# Patient Record
Sex: FEMALE | Race: BLACK OR AFRICAN AMERICAN | NOT HISPANIC OR LATINO | URBAN - METROPOLITAN AREA
[De-identification: names, ages, dates, MRNs, and addresses within clinical notes are randomized per-mention and may not be internally consistent; named-entity substitution may affect disease eponyms.]

---

## 2019-07-16 ENCOUNTER — OFFICE VISIT (OUTPATIENT)
Dept: BARIATRICS | Facility: CLINIC | Age: 54
End: 2019-07-16
Payer: OTHER GOVERNMENT

## 2019-07-16 VITALS
DIASTOLIC BLOOD PRESSURE: 80 MMHG | HEART RATE: 65 BPM | SYSTOLIC BLOOD PRESSURE: 100 MMHG | WEIGHT: 232.81 LBS | HEIGHT: 69 IN | BODY MASS INDEX: 34.48 KG/M2

## 2019-07-16 DIAGNOSIS — I10 ESSENTIAL HYPERTENSION: ICD-10-CM

## 2019-07-16 DIAGNOSIS — E66.9 OBESITY, CLASS I, BMI 30.0-34.9 (SEE ACTUAL BMI): Primary | ICD-10-CM

## 2019-07-16 DIAGNOSIS — E78.5 HYPERLIPIDEMIA, UNSPECIFIED HYPERLIPIDEMIA TYPE: ICD-10-CM

## 2019-07-16 DIAGNOSIS — H40.9 GLAUCOMA, UNSPECIFIED GLAUCOMA TYPE, UNSPECIFIED LATERALITY: ICD-10-CM

## 2019-07-16 DIAGNOSIS — R73.03 PREDIABETES: ICD-10-CM

## 2019-07-16 PROBLEM — E66.811 OBESITY, CLASS I, BMI 30.0-34.9 (SEE ACTUAL BMI): Status: ACTIVE | Noted: 2019-07-16

## 2019-07-16 PROCEDURE — 99999 PR PBB SHADOW E&M-NEW PATIENT-LVL III: CPT | Mod: PBBFAC,,, | Performed by: INTERNAL MEDICINE

## 2019-07-16 PROCEDURE — 99205 OFFICE O/P NEW HI 60 MIN: CPT | Mod: S$GLB,,, | Performed by: INTERNAL MEDICINE

## 2019-07-16 PROCEDURE — 99999 PR PBB SHADOW E&M-NEW PATIENT-LVL III: ICD-10-PCS | Mod: PBBFAC,,, | Performed by: INTERNAL MEDICINE

## 2019-07-16 PROCEDURE — 99205 PR OFFICE/OUTPT VISIT, NEW, LEVL V, 60-74 MIN: ICD-10-PCS | Mod: S$GLB,,, | Performed by: INTERNAL MEDICINE

## 2019-07-16 RX ORDER — ATORVASTATIN CALCIUM 20 MG/1
20 TABLET, FILM COATED ORAL NIGHTLY
Refills: 5 | COMMUNITY
Start: 2019-05-03 | End: 2023-05-29 | Stop reason: DRUGHIGH

## 2019-07-16 RX ORDER — AMLODIPINE BESYLATE 5 MG/1
5 TABLET ORAL DAILY
Refills: 3 | COMMUNITY
Start: 2019-04-15 | End: 2024-01-09 | Stop reason: SDUPTHER

## 2019-07-16 RX ORDER — HYDROCHLOROTHIAZIDE 12.5 MG/1
12.5 TABLET ORAL DAILY
Refills: 4 | COMMUNITY
Start: 2019-06-20 | End: 2024-01-09 | Stop reason: SDUPTHER

## 2019-07-16 RX ORDER — IRBESARTAN 150 MG/1
150 TABLET ORAL DAILY
Refills: 3 | COMMUNITY
Start: 2019-04-15 | End: 2023-02-16

## 2019-07-16 NOTE — PATIENT INSTRUCTIONS
Start Ozempic once a week. Start with 0.25mg once a week x 4 weeks, then 0.5 mg weekly.     Decrease portions as soon as you start Ozempic. Some nausea in the first 2 weeks is not unusual.     If you get pain across the upper abdomen and around to your back, please call the office.       Protein and fiber in every meal.     3 meals a day made up of the following:  Unlimited green vegetables, tomatoes, mushrooms, spaghetti squash, cauliflower, meat, poultry, seafood, eggs and hard cheeses.   Milk and plain yogurt  Dressings, seasonings, condiments, etc should have less than 2 g sugars.   Beans (1-1.5 cups) or nuts (1/4 cup) can have 1 x a day.   1-2 servings of citrus fruits, berries, pineapple or melon a day (1/2 cup)    Avoid fried foods    Avoid/limit sweets, grains, rice, pasta, potatoes, bread, corn, peas, oatmeal, grits, tortillas, crackers, chips    No soda, sweet tea, juices or lemonade.     Www.dietdoctor.Rule. for recipes. Moderate carb intake      *You can substitute regular dairy and/or dressings, and whole eggs for egg whites in the ideas below.       Meal Ideas for Regular Bariatric Diet  *Recipes and products available at www.bariatriceating.com      Breakfast: (15-20g protein)    - Egg white omelet: 2 egg whites or ½ cup Egg Beaters. (Optional proteins: cheese, shrimp, black beans, chicken, sliced turkey) (Optional veggies: tomatoes, salsa, spinach, mushrooms, onions, green peppers, or small slice avocado)     - Egg and sausage: 1 egg or ¼ cup Egg Beaters (any variety), with 1 gabby or 2 links of Turkey sausage or Veggie breakfast sausage (inContact or Sensulin)    - Crust-less breakfast quiche: To make a glass pie dish, mix 4oz part skim Ricotta, 1 cup skim milk, and 2 eggs as your base. Add protein: shredded cheese, sliced lean ham or turkey, turkey yoder/sausage. Add veggies: tomato, onion, green onion, mushroom, green pepper, spinach, etc.    - Yogurt parfait: Mix 1 - 6oz container Filemon Ferraro  N Fit vanilla yogurt, with ¼ cup Kashi Go Lean cereal    - Cottage cheese and fruit: ½ cup part-skim cottage cheese or ricotta cheese topped with fresh fruit or sugar free preserves     - Hilary Ann's Vanilla Egg custard* (add 2 Tbsp instant coffee granules to make Cappuccino Custard*)    - Hi-Protein café latte (skim milk, decaf coffee, 1 scoop protein powder). Optional to add Sugar free syrup or extract flavoring.    Lunch: (20-30g protein)    - ½ cup Black bean soup (Homemade or Progresso), with ¼ cup shredded low-fat cheese. Top with chopped tomato or fresh salsa.     - Lean deli turkey breast and low-fat sliced cheese, mustard or light weir to moisten, rolled up together, or wrapped in a Familia lettuce leaf    - Chicken salad made from dinner leftovers, moisten with low-fat salad dressing or light weir. Also try leftover salmon, shrimp, tuna or boiled eggs. Serve ½ cup over dark green salad    - Fat-free canned refried beans, topped with ¼ cup shredded low-fat cheese. Top with chopped tomato or fresh salsa.     - Greek salad: Top mixed greens with 1-2oz grilled chicken, tomatoes, red onions, 2-3 kalamata olives, and sprinkle lightly with feta cheese. Spritz with Balsamic vinegar to taste.     - Crust-less lunch quiche: To make a glass pie dish, mix 4oz part skim Ricotta, 1 cup skim milk, and 2 eggs as your base. Add protein: shredded cheese, sliced lean ham or turkey, shrimp, chicken. Add veggies: tomato, onion, green onion, mushroom, green pepper, spinach, artichoke, broccoli, etc.    - Pizza bake: tomato sauce, low-fat shredded mozzarella and turkey pepperoni or Gabonese yoder. Add any veggies.    - Cucumber crab bites: Spread ¼ cup crab dip (lump crabmeat + light cream cheese and green onions) over sliced cucumber.     - Chicken with light spinach and artichoke dip*: Puree in : 6oz cooked and drained spinach, 2 cloves garlic, 1 can cannelloni beans, ½ cup chopped green onions, 1 can drained  artichoke hearts (not marinated in oil), lemon juice and basil. Mix in 2oz chopped up chicken.    Supper: (20-30g protein)    - Serve grilled fish over dark green salad tossed with low-fat dressing, served with grilled asparagus gunter     - Rotisserie chicken salad: served with sliced strawberries, walnuts, fat-free feta cheese crumbles and 1 tbsp Cabas Own Light Raspberry Dwight Vinaigrette    - Shrimp cocktail: Dip cold boiled shrimp in homemade low-sugar cocktail sauce (1/2 cup Hanny One Carb ketchup, 2 tbsp horseradish, 1/4 tsp hot sauce, 1 tsp Worcestershire sauce, 1 tbsp freshly-squeezed lemon juice). Serve with dark green salad, walnuts, and crumbled blue cheese drizzled with olive oil and Balsamic vinegar    - Tuna Melt: Spread tuna salad onto 2 thick slices of tomato. Top with low-fat cheese and broil until cheese is melted. May also be made with chicken salad of shrimp salad. Steuben with different types of cheeses.    - Homemade low-fat Chili using extra lean ground beef or ground turkey. Top with shredded cheese and salsa as desired. May add dollop fat-free sour cream if desired    - Dinner Omelet with shrimp or chicken and onion, green peppers and chives.    - No noodle lasagna: Use sliced zucchini or eggplant in place of noodles.  Layer with part skim ricotta cheese and low sugar meat sauce (use very lean ground beef or ground turkey).    - Mexican chicken bake: Bake chunks of chicken breast or thigh with taco seasoning, Pace brand enchilada sauce, green onions and low-fat cheese. Serve with ¼ cup black beans or fat free refried beans topped with chopped tomatoes or salsa.    - Modesto frozen meatballs, simmered in Classico Marinara sauce. Different flavors of salsa or spaghetti sauce create different dishes! Sprinkle with parmesan cheese. Serve with grilled or steamed veggies, or a dark green salad.    - Simmer boneless skinless chicken thigh chunks in Classico Marielina sauce or roasted  salsa until tender with chopped onion, bell pepper, garlic, mushrooms, spinach, etc.     - Hamburger, without the bun, dressed the way you like. Served with grilled or steamed veggies.    - Eggplant parmesan: Bake slices of eggplant at 350 degrees for 15 minutes. Layer tomato sauce, sliced eggplant and low-fat mozzarella cheese in a baking dish and cover with foil. Bake 30-40 more minutes or until bubbly. Uncover and bake at 400 degrees for about 15 more minutes, or until top is slightly crisp.    - Fish tacos: grilled/baked white fish, wrapped in Familia lettuce leaf, topped with salsa, shredded low-fat cheese, and light coleslaw.    Snacks: (100-200 calories; >5g protein)    - 1 low-fat cheese stick with 8 cherry tomatoes or 1 serving fresh fruit  - 4 thin slices fat-free turkey breast and 1 slice low-fat cheese  - 4 thin slices fat-free honey ham with wedge of melon  - 1/4 cup unsalted nuts with ½ cup fruit  - 6-oz container Dannon Light n Fit vanilla yogurt, topped with 1oz unsalted nuts         - apple, celery or baby carrots spread with 2 Tbsp natural peanut butter or almond butter   - apple slices with 1 oz slice low-fat cheese  - celery, cucumber, bell pepper or baby carrots dipped in ¼ cup hummus bean spread or light spinach and artichoke dip (*recipe in lunch section)  - 100 calorie bag microwave light popcorn with 3 tbsp grated parmesan cheese  - Augusto Links Beef Steak - 14g protein! (similar to beef jerky)  - 2 wedges Laughing Cow - Light Herb & Garlic Cheese with sliced cucumber or green bell pepper  - 1/2 cup low-fat cottage cheese with ¼ cup fruit or ¼ cup salsa  - RTD Protein drinks: Atkins, Low Carb Slim Fast, EAS light, Muscle Milk Light, etc.  - Homemade Protein drinks: GNC Soy95, Isopure, Nectar, UNJURY, Whey Gourmet, etc. Mix 1 scoop powder with 8oz skim/1% milk or light soymilk.  - Protein bars: Atkins, EAS, Pure Protein, Think Thin, Detour, etc. Must have 0-4 grams sugar - Read the  label.    Takeout Options: No more than twice/week  Deli - Salads (no pasta or rice), meats, cheeses. Roasted chicken. Lox (salmon)    Mexican - Platters which don't include tortillas, chips, or rice. Go easy on the beans. Example: Fajitas without the tortillas. Ask the  not to bring chips to the table if they are too tempting.    Greek - Meat or fish and vegetable, but no bread or rice. Including hummus, baba ganoush, etc, is OK. Most sit-down Greek restaurants can provide you with cucumber slices for dipping instead of shaina bread.    Fast Food (Avoid as much as possible) - Salads (no croutons and limit salad dressing to 2 tbsp), grilled chicken sandwich without the bun and ask for no weir. Yelitzas low fat chili or Taco Bell pintos and cheese.    BBQ - The meats are fine if you ask for sauces on the side, but most of the traditional side dishes are loaded with carbs. Sha slaw, baked beans and BBQ sauce are typically made with sugar.    Chinese - Nothing deep-fried, no rice or noodles. Many Chinese sauces have starch and sugar in them, so you'll have to use your judgement. If you find that these sauces trigger cravings, or cause Dumping, you can ask for the sauce to be made without sugar or just use soy sauce.

## 2019-07-16 NOTE — LETTER
[facility]  [address]  [facility] [address]  Phone: [phone]  Fax: [phone] July 16, 2019      Lamont Patel MD  [address]  [address]  [place] [address]    Patient: Shiloh Thayer   MR Number: [id number]   YOB: 1965   Date of Visit: 7/16/2019     Dear Dr. Patel:    Thank you for referring Shiloh Thayer to me for evaluation. Below are the relevant portions of my assessment and plan of care.    Ms. Thayer's assessment is as follows:    1. Obesity, Class I, BMI 30.0-34.9 (see actual BMI)    2. Essential hypertension    3. Hyperlipidemia, unspecified hyperlipidemia type    4. Prediabetes    5. Glaucoma, unspecified glaucoma type, unspecified laterality        PLAN:  1. Obesity, Class I, BMI 30.0-34.9 (see actual BMI)  Start Ozempic once a week. Start with 0.25mg once a week x 4 weeks, then 0.5 mg weekly.      Decrease portions as soon as you start Ozempic. Some nausea in the first two weeks is not unusual.      If you get pain across the upper abdomen and around to your back, please call the office.       Protein and fiber in every meal.      3 meals a day made up of the following:  · Unlimited green vegetables, tomatoes, mushrooms, spaghetti squash, cauliflower, meat, poultry, seafood, eggs and hard cheeses.   · Milk and plain yogurt  · Dressings, seasonings, condiments, etc should have less than 2 g sugars.   · Beans (1-1.5 cups) or nuts (1/4 cup) can have 1 x a day.   · 1-2 servings of citrus fruits, berries, pineapple or melon a day (1/2 cup)  · Avoid fried foods     Avoid/limit sweets, grains, rice, pasta, potatoes, bread, corn, peas, oatmeal, grits, tortillas, crackers, chips     No soda, sweet tea, juices or lemonade.      [email or web address].dietdoctor.com for recipes. Moderate carb intake     2. Essential hypertension  The current medical regimen is effective; continue present plan and medications. Expect improvement with weight loss.      3. Hyperlipidemia,  unspecified hyperlipidemia type  Expect improvement with weight loss. Recheck after 10% TBW lost.       4. Prediabetes  Discussed decreasing the risks of diabetes with changes in diet, routine exercise and losing weight. Ozempic will help.  She will bring in updated labs next OV.      5. Glaucoma  Avoid stimulant anorectics, topiramate and contrave. The patient will update medlist with drops via [facility].       If you have questions, please do not hesitate to call me. I look forward to following Shiloh along with you.    Sincerely,      Norma Lord MD   Section of Bariatric Surgery  Department of Surgery  [facility]    AGF/afw

## 2019-07-16 NOTE — PROGRESS NOTES
"Subjective:       Patient ID: Shiloh Thayer is a 53 y.o. female.    Chief Complaint: Consult    CC: Weight    Current attempts at weight loss: New pt to me, referred by No referring provider defined for this encounter. , with Patient Active Problem List:     Hypertension     Hyperlipidemia     Obesity, Class I, BMI 30.0-34.9 (see actual BMI)       "monitors" her carbs. Walking or biking 2-3 times a week for 30 min. States told blood sugar was borderline.     Previous diet attempts: Denies.     History of medication for loss: Denies.   checked today     Heaviest weight: 245#    Lightest weight: 170#    Goal weight: 185#      Last eye exam:   + glaucoma. On drops.      Provider: Dr. Walsh.     Typical eating patterns: Works at [employer]. Lives with . Pt does cooking.   Breakfast: May skip. Protein shake. Weekends: eggs, sausage, toast.     Lunch: salad with grilled chicken. Turkey sandwich. Subway- cold cut trio 6 inch. Weekends: burgers, fried seafood.     Dinner: beans and rice, steak and potatoes, pasta. Weekends: fried seafood. ROast and pot and veg.     Snacks: chips, cookies.     Beverages: water, wine- 2 times a week. Coffee- cream and sugar.     Willingness to change: 10/10    EKG:    BMR: 1709    Review of Systems   Constitutional: Negative for chills and fever.   Respiratory: Negative for shortness of breath.         + snores   Cardiovascular: Negative for chest pain and leg swelling.   Gastrointestinal: Negative for constipation and diarrhea.        Denies GERD   Genitourinary: Negative for difficulty urinating and dysuria.        S/p hyst   Musculoskeletal: Positive for arthralgias and joint swelling.   Neurological: Negative for dizziness and light-headedness.   Psychiatric/Behavioral: Negative for dysphoric mood. The patient is not nervous/anxious.        Objective:     /80   Pulse 65   Ht 5' 8.5" (1.74 m)   Wt 105.6 kg (232 lb 12.9 oz)   LMP  (LMP Unknown) Comment: hysterectomy   " BMI 34.88 kg/m²    Physical Exam   Constitutional: She is oriented to person, place, and time. She appears well-developed. No distress.   obese   HENT:   Head: Normocephalic and atraumatic.   Eyes: Pupils are equal, round, and reactive to light. EOM are normal. No scleral icterus.   Neck: Normal range of motion. Neck supple. No thyromegaly present.   Cardiovascular: Normal rate and normal heart sounds. Exam reveals no gallop and no friction rub.   No murmur heard.  Pulmonary/Chest: Effort normal and breath sounds normal. No respiratory distress. She has no wheezes.   Abdominal: Soft. Bowel sounds are normal. She exhibits no distension. There is no tenderness.   Musculoskeletal: Normal range of motion. She exhibits no edema.   Neurological: She is alert and oriented to person, place, and time. No cranial nerve deficit.   Skin: Skin is warm and dry. No erythema.   Psychiatric: She has a normal mood and affect. Her behavior is normal. Judgment normal.   Vitals reviewed.      Assessment:       1. Obesity, Class I, BMI 30.0-34.9 (see actual BMI)    2. Essential hypertension    3. Hyperlipidemia, unspecified hyperlipidemia type    4. Prediabetes    5. Glaucoma, unspecified glaucoma type, unspecified laterality        Plan:         1. Obesity, Class I, BMI 30.0-34.9 (see actual BMI)  Start Ozempic once a week. Start with 0.25mg once a week x 4 weeks, then 0.5 mg weekly.     Decrease portions as soon as you start Ozempic. Some nausea in the first 2 weeks is not unusual.     If you get pain across the upper abdomen and around to your back, please call the office.       Protein and fiber in every meal.     3 meals a day made up of the following:  Unlimited green vegetables, tomatoes, mushrooms, spaghetti squash, cauliflower, meat, poultry, seafood, eggs and hard cheeses.   Milk and plain yogurt  Dressings, seasonings, condiments, etc should have less than 2 g sugars.   Beans (1-1.5 cups) or nuts (1/4 cup) can have 1 x a day.    1-2 servings of citrus fruits, berries, pineapple or melon a day (1/2 cup)    Avoid fried foods    Avoid/limit sweets, grains, rice, pasta, potatoes, bread, corn, peas, oatmeal, grits, tortillas, crackers, chips    No soda, sweet tea, juices or lemonade.     [email or web address] for recipes. Moderate carb intake      2. Essential hypertension  The current medical regimen is effective;  continue present plan and medications. Expect improvement with weight loss.     3. Hyperlipidemia, unspecified hyperlipidemia type  Expect improvement with weight loss. Recheck after 10% TBW lost.      4. Prediabetes  Discussed decreasing the risks of diabetes with changes in diet, routine exercise and losing weight. Ozempic will help.  She will bring in updated labs next OV.     5. Glaucoma  Avoid stimulant anorectics, topiramate and contrave. Pt will update medlist with drops via [facility].

## 2019-09-17 ENCOUNTER — TELEPHONE (OUTPATIENT)
Dept: BARIATRICS | Facility: CLINIC | Age: 54
End: 2019-09-17

## 2019-09-17 NOTE — TELEPHONE ENCOUNTER
Called patient pharmacy [facility]. Spoke with Pharmacy chiquita Miles. I informed  how come Ms. Thayer, RxOzempic price has change from $25 to $700 dollars. Ms miles stated, Ms. Thayer insurance must have change. She is now being charge with coupon $242.00 for 1 box of RxOzempic. Her insurance will only cover $300. MsMegan Miles suggested Ms. Thayer need to call her insurance and add alternative medications to her Coverage. I informed MsMegan Miles I will let Ms. Thayer Know.

## 2019-09-17 NOTE — TELEPHONE ENCOUNTER
----- Message from Doug Hammer sent at 9/17/2019  9:02 AM CDT -----  Contact: Pt  Type:  Needs Medical Advice    Who Called: The Pt would like a call back about her medication.      Best Call Back Number:[phone]

## 2019-09-17 NOTE — TELEPHONE ENCOUNTER
Called patient and informed her of the advice I've gain from the pharmacy. In regards to RxOzempic. I asked Ms. Thayer did her insurance tier change. Patient stated No, she called her insurance and they informed her Rx Ozempic has to be prescribed as a 90 day supply. I  Informed Ms. Thayer yes that's correct usually the medication does not get cover by your insurance if  It's not a 90 day supply, also informed Ms. Thayer she has been seen recently by , and has used medication that was prescribed to her 07/16/2019. Patient stated she should be fine until her Next appointment she has been doing really great and thanks for finding out information in regards to her prescription.

## 2019-09-17 NOTE — TELEPHONE ENCOUNTER
Returned Ms. Thayer Call. Patient stated in the past she only had to pay $25 for RxOzempic. Now she is being charge $700 and would like to know why, I informed Ms. Thayer, I will [facility] to gain some information.

## 2019-09-17 NOTE — TELEPHONE ENCOUNTER
If its possible for her to come in before her next rf is due, I would like to make sure we don't need to change it if I have to send in a 90 day supply

## 2019-09-18 NOTE — TELEPHONE ENCOUNTER
Called Ms. Thayer. Asked if it was possible for her to come in for a sooner appointment, before her next refill is due. Patient stated she have enough medication, that will last her until 2 weeks. Offered ms Thayer, 10/15/2019 for 10:30am. Patient stated that time frame will work. Informed sherrill Smith I will asked  can she open that time slot an I will scheduled her for that day and time, and give her a call back when it's scheduled.

## 2019-10-15 ENCOUNTER — OFFICE VISIT (OUTPATIENT)
Dept: BARIATRICS | Facility: CLINIC | Age: 54
End: 2019-10-15
Payer: OTHER GOVERNMENT

## 2019-10-15 VITALS
HEIGHT: 69 IN | BODY MASS INDEX: 31.34 KG/M2 | SYSTOLIC BLOOD PRESSURE: 122 MMHG | WEIGHT: 211.63 LBS | DIASTOLIC BLOOD PRESSURE: 64 MMHG | HEART RATE: 64 BPM

## 2019-10-15 DIAGNOSIS — E66.9 OBESITY, CLASS I, BMI 30.0-34.9 (SEE ACTUAL BMI): Primary | ICD-10-CM

## 2019-10-15 PROCEDURE — 99999 PR PBB SHADOW E&M-EST. PATIENT-LVL III: CPT | Mod: PBBFAC,,, | Performed by: INTERNAL MEDICINE

## 2019-10-15 PROCEDURE — 99999 PR PBB SHADOW E&M-EST. PATIENT-LVL III: ICD-10-PCS | Mod: PBBFAC,,, | Performed by: INTERNAL MEDICINE

## 2019-10-15 PROCEDURE — 99213 PR OFFICE/OUTPT VISIT, EST, LEVL III, 20-29 MIN: ICD-10-PCS | Mod: S$GLB,,, | Performed by: INTERNAL MEDICINE

## 2019-10-15 PROCEDURE — 99213 OFFICE O/P EST LOW 20 MIN: CPT | Mod: S$GLB,,, | Performed by: INTERNAL MEDICINE

## 2019-10-15 RX ORDER — LATANOPROST 50 UG/ML
SOLUTION/ DROPS OPHTHALMIC
COMMUNITY
Start: 2019-09-23

## 2019-10-15 RX ORDER — IBUPROFEN 800 MG/1
TABLET ORAL
Refills: 0 | COMMUNITY
Start: 2019-07-19 | End: 2020-10-31

## 2019-10-15 RX ORDER — HYDROCODONE BITARTRATE AND ACETAMINOPHEN 5; 325 MG/1; MG/1
1 TABLET ORAL
Refills: 0 | COMMUNITY
Start: 2019-07-19 | End: 2023-02-16 | Stop reason: ALTCHOICE

## 2019-10-15 NOTE — PROGRESS NOTES
"Subjective:       Patient ID: Shiloh Thayer is a 54 y.o. female.    Chief Complaint: Follow-up    Pt here today for follow-up. Has lost 21 lbs. Started with ozempic and LCHF diet. States shehas been doing well with eating changes. No recent labs. Had some nausea early on with Ozempic, and her appetite is down. Has not used it in 3 weeks, and still feels she is doing well with eating. Walking for exercise. Sometimes a bike ride.     Follow-up   Associated symptoms include arthralgias and joint swelling. Pertinent negatives include no chest pain, chills or fever.     Review of Systems   Constitutional: Negative for chills and fever.   Respiratory: Negative for shortness of breath.         + snores   Cardiovascular: Negative for chest pain and leg swelling.   Gastrointestinal: Negative for constipation and diarrhea.        Denies GERD   Genitourinary: Negative for difficulty urinating and dysuria.        S/p hyst   Musculoskeletal: Positive for arthralgias and joint swelling.   Neurological: Negative for dizziness and light-headedness.   Psychiatric/Behavioral: Negative for dysphoric mood. The patient is not nervous/anxious.        Objective:     /64   Pulse 64   Ht 5' 8.5" (1.74 m)   Wt 96 kg (211 lb 10.3 oz)   BMI 31.71 kg/m²    Physical Exam   Constitutional: She is oriented to person, place, and time. She appears well-developed. No distress.   obese   HENT:   Head: Normocephalic and atraumatic.   Eyes: Pupils are equal, round, and reactive to light. EOM are normal. No scleral icterus.   Neck: Normal range of motion. Neck supple.   Cardiovascular: Normal rate.   Pulmonary/Chest: Effort normal.   Musculoskeletal: Normal range of motion. She exhibits no edema.   Neurological: She is alert and oriented to person, place, and time. No cranial nerve deficit.   Skin: Skin is warm and dry. No erythema.   Psychiatric: She has a normal mood and affect. Her behavior is normal. Judgment normal.   Vitals reviewed.    "   Assessment:       1. Obesity, Class I, BMI 30.0-34.9 (see actual BMI)        Plan:         Shiloh was seen today for follow-up.    Diagnoses and all orders for this visit:    Obesity, Class I, BMI 30.0-34.9 (see actual BMI)    Other orders  -     semaglutide (OZEMPIC) 0.25 mg or 0.5 mg(2 mg/1.5 mL) PnIj; Inject 0.5 mg into the skin every 7 days.        Start Ozempic once a week. Start with 0.25mg once a week x 2 weeks, then 0.5 mg weekly.     Decrease portions as soon as you start Ozempic. Some nausea in the first 2 weeks is not unusual.     If you get pain across the upper abdomen and around to your back, please call the office.       Protein and fiber in every meal.     3 meals a day made up of the following:  Unlimited green vegetables, tomatoes, mushrooms, spaghetti squash, cauliflower, meat, poultry, seafood, eggs and hard cheeses.   Milk and plain yogurt  Dressings, seasonings, condiments, etc should have less than 2 g sugars.   Beans (1-1.5 cups) or nuts (1/4 cup) can have 1 x a day.   1-2 servings of citrus fruits, berries, pineapple or melon a day (1/2 cup)    Avoid fried foods    Avoid/limit sweets, grains, rice, pasta, potatoes, bread, corn, peas, oatmeal, grits, tortillas, crackers, chips    No soda, sweet tea, juices or lemonade.     [email or web address].[email or web address] for recipes. Moderate carb intake

## 2019-10-15 NOTE — PATIENT INSTRUCTIONS
Start Ozempic once a week. Start with 0.25mg once a week x 2 weeks, then 0.5 mg weekly.       Decrease portions as soon as you start Ozempic. Some nausea in the first 2 weeks is not unusual.     If you get pain across the upper abdomen and around to your back, please call the office.       Exercise: Add some type of resistance training 2-3 days a week. These can be body weight exercises, light weight or elastic bands. [employer] are great sources for free work out plans and videos.     Protein and fiber in every meal.     3 meals a day made up of the following:  Unlimited green vegetables, tomatoes, mushrooms, spaghetti squash, cauliflower, meat, poultry, seafood, eggs and hard cheeses.   Milk and plain yogurt  Dressings, seasonings, condiments, etc should have less than 2 g sugars.   Beans (1-1.5 cups) or nuts (1/4 cup) can have 1 x a day.   1-2 servings of citrus fruits, berries, pineapple or melon a day (1/2 cup)    Avoid fried foods    Avoid/limit sweets, grains, rice, pasta, potatoes, bread, corn, peas, oatmeal, grits, tortillas, crackers, chips    No soda, sweet tea, juices or lemonade.     Www.dietdoctor.[employer] for recipes. Moderate carb intake        Helpful tips to survive the holidays:  - Dont save yourself for the meal: Make sure you continue to eat high protein small meals every 3-4 hours to ensure to do not become over-hungry. Avoid temptation by not showing up to a holiday party or gathering hungry.   - Plan ahead. Bring a dish to a party if you know there may not be an appropriate option.   - Choose sugar-free drinks: Stick to water or other sugar-free beverages and make sure you are getting 6-8 cups of fluid each day (but not with meals!). Avoid alcohol, carbonated beverages, and high-fat/high-sugar beverages like hot chocolate and eggnog. Try sugar-free hot cocoa made with skim milk or water, or sugar-free spiced tea to add some holiday flair to your beverage (see sugar-free mulled cider  recipe below)  - Take your time: Eat mindfully. Dont graze on food throughout the day. Sit down to enjoy your small meals. Chew slowly and thoroughly. Cut your food into small pieces before eating.  - Listen to your body: Stop eating as soon as you feel full. Do not feel pressured to try certain (or all) foods or to eat all of the food on your plate. Listen to your hunger cues.   - REMEMBER: Make your holidays about spending time with family and friends instead of focusing gatherings around food.  - Keep up your exercise routine: Make sure you continue to get regular exercise throughout the holiday season. Encourage friends and family to be active by taking a walk together after a meal, to look at holiday lights, or to window-shop.    Good Holiday Meal Options:  - Roasted Turkey, NO skin. Use low sodium broth instead of gravy.   - Stuffed Bell Peppers made WITHOUT bread crumbs or Rice. Try using parmesan cheese instead  - Gumbo, NO rice. Try picking out mostly the meat/seafood and vegetables with little broth.   - Green Bean Casserole made with 98% fat free cream of mushroom soup and crushed almonds/pecans instead of fried onions  - Side salad w/ low fat dressing. Try a different kind of salad maybe use Kale or spinach.   - Roasted non-starchy vegetables like brussel sprouts, broccoli, green beans, zucchini, butternut squash, cauliflower  - Cauliflower Mash (steam or roast cauliflower, puree w/ low fat cheese, dash of fat free milk and 2-3 sprays of I cant believe its not butter spray. Add garlic powder and black pepper to season). Use Low sodium broth instead of gravy.   - Try Loaded Cauliflower Mash (Make cauliflower like above cauliflower mash. Top with diced turkey yoder, ¼ cup low fat cheddar cheese and bake @ 350* F for 5-10 minutes, until cheese is melted. Top with minced chives, black pepper and garlic to taste).   - Homemade cranberry sauce using Splenda or another alternative sweetener. Boil fresh  cranberries and add splenda to taste. Boil until cranberries break open and then simmer until it reaches the consistency you want (less time for more watery sauce and simmer for longer to create a thicker sauce).   - Deviled eggs: make using low fat weir, mustard, DILL relish (not sweet relish).   - Vegetable tray w/ Greek yogurt Ranch Dip. Mix 1 packet of [place] ranch dip mix w/ 16 oz low fat plain greek yogurt.     Good Holiday Dessert Options:  - High protein Pumpkin Cheesecake (see recipe below)  - Pumpkin Whip (see recipe below)  - Quest Apple Pie or Cinnamon Roll flavored protein bar (warm in microwave for 10-15 seconds)  - Eggnog Protein shake (see recipe below)  - Fresh fruit w/ low fat cheese  - Sugar-free Jello Parfaits. Layer Red and Green sugar-free jello in cups and top w/ 2 tbsp Sugar-free cool-whip    Pumpkin Cheesecake    8 ounces fat free cream cheese, softened   2 scoops of vanilla protein powder (<4 g sugar per serving)   ¼ tsp Fine salt   2 eggs, at room temperature   1/3 cup fat free sour cream  1/3 cup fat free half and half  1 15 -ounce can pure pumpkin puree   1 tablespoon pumpkin pie spice, plus more for dusting   Unsalted nuts, crushed  *Add splenda to taste    Directions     1. Preheat the oven to 300 degrees F. Line 18 muffin cups with paper liners. Sprinkle 1 tsp crushed unsalted nuts at the bottom of each of muffin cup liner.     2. In a large bowl, beat the cream cheese, vanilla protein powder and 1/4 teaspoon fine salt on medium-high speed until smooth and creamy, 2 to 3 minutes. Scrape down the sides, reduce speed to low and beat in the eggs, 1 at a time, until combined. Beat in 1/3 cup fat free sour cream and fat free half and half. Stir in the pumpkin puree and pumpkin pie spice until smooth. Divide evenly among cookie-lined paper cups, filling almost all the way to the top.     3. Bake until the filling is just set, 40 to 45 minutes. A sharp knife inserted into the center  will come out moist, but clean. Cool completely in tins on a wire rack. Refrigerate until cold, 4 hours, or overnight. Top with a dusting of pumpkin pie spice.    Recipe altered from the following recipe: http://[email or web address]/recipes/food-network-moon/mini-pumpkin-cheesecakes-recipe.print.html?oc=linkback    Pumpkin Whip    Box of sugar-free vanilla pudding  Can of pumpkin puree  Pumpkin Pie spice (sprinkle to taste)  ½ cup of sugar-free Cool Whip    Directions:  Make sugar-free pudding according to package directions using fat free or 1% milk. Stir in pumpkin and cool whip. Add pumpkin pie spice to taste.     Egg Nog Protein shake    8 oz skim or 1% milk  1 scoop vanilla protein powder  1 tbsp sugar-free vanilla pudding mix  ½ tsp butter flavor extract  ½ tsp rum extract  ½ tsp cinnamon     Shake together or blend with ice and serve.     Sugar-Free Mulled Cider    3 oz diet cran-apple juice  6 oz water  1 packet sugar-free apple cider mix  ½ tsp apple pie spice  ½ tsp butter flavor extract  1 tbsp Sugar-free Syrup    Mix together. Warm if needed and serve w/ orange wedge and cinnamon stick.

## 2019-11-04 ENCOUNTER — TELEPHONE (OUTPATIENT)
Dept: BARIATRICS | Facility: CLINIC | Age: 54
End: 2019-11-04

## 2019-11-04 NOTE — TELEPHONE ENCOUNTER
Spoke with Ms. Thayer, in regards to Rx.Oxempic. Stating, her medication will cost $1200. I informed. Ms. Thayer, I received a faxed from her pharmacy [facility]  Stating, under insurance Rx. Will cost $1000. Explained to Ms. Thayer I will informed , so she can find another alternative of medication. Patient stated thanks for calling.

## 2019-11-11 NOTE — TELEPHONE ENCOUNTER
She has been on Ozempic. Is there an explanation for the change? Can we do PA for dx Metabolic syndrome?  Is there a different med in some group covered? Trulicity or vitoza?  Otherwise options are limited 2/2 to glaucoma.

## 2019-11-12 NOTE — TELEPHONE ENCOUNTER
Called ms. Thayer informed her I will completing a prior authorization for her. Once I have received the result's I will give her a call back with an update.

## 2019-11-14 ENCOUNTER — TELEPHONE (OUTPATIENT)
Dept: BARIATRICS | Facility: CLINIC | Age: 54
End: 2019-11-14

## 2020-02-14 ENCOUNTER — OFFICE VISIT (OUTPATIENT)
Dept: URGENT CARE | Facility: CLINIC | Age: 55
End: 2020-02-14
Payer: OTHER GOVERNMENT

## 2020-02-14 VITALS
DIASTOLIC BLOOD PRESSURE: 77 MMHG | OXYGEN SATURATION: 98 % | HEART RATE: 84 BPM | BODY MASS INDEX: 31.98 KG/M2 | WEIGHT: 211 LBS | TEMPERATURE: 100 F | HEIGHT: 68 IN | SYSTOLIC BLOOD PRESSURE: 133 MMHG

## 2020-02-14 DIAGNOSIS — J02.0 STREPTOCOCCAL PHARYNGITIS: Primary | ICD-10-CM

## 2020-02-14 DIAGNOSIS — J02.9 SORE THROAT: ICD-10-CM

## 2020-02-14 LAB
CTP QC/QA: YES
MOLECULAR STREP A: POSITIVE

## 2020-02-14 PROCEDURE — 87651 STREP A DNA AMP PROBE: CPT | Mod: QW,S$GLB,, | Performed by: PHYSICIAN ASSISTANT

## 2020-02-14 PROCEDURE — 87651 POCT STREP A MOLECULAR: ICD-10-PCS | Mod: QW,S$GLB,, | Performed by: PHYSICIAN ASSISTANT

## 2020-02-14 PROCEDURE — 99214 OFFICE O/P EST MOD 30 MIN: CPT | Mod: S$GLB,,, | Performed by: PHYSICIAN ASSISTANT

## 2020-02-14 PROCEDURE — 99214 PR OFFICE/OUTPT VISIT, EST, LEVL IV, 30-39 MIN: ICD-10-PCS | Mod: S$GLB,,, | Performed by: PHYSICIAN ASSISTANT

## 2020-02-14 RX ORDER — AMOXICILLIN 875 MG/1
875 TABLET, FILM COATED ORAL 2 TIMES DAILY
Qty: 20 TABLET | Refills: 0 | Status: SHIPPED | OUTPATIENT
Start: 2020-02-14 | End: 2020-02-24

## 2020-02-15 NOTE — PROGRESS NOTES
"Subjective:       Patient ID: Shiloh Thayer is a 54 y.o. female.    Vitals:  height is 5' 8" (1.727 m) and weight is 95.7 kg (211 lb). Her temperature is 99.5 °F (37.5 °C). Her blood pressure is 133/77 and her pulse is 84. Her oxygen saturation is 98%.     Chief Complaint: Sinus Problem    Pt states since yesterday she has a sore throat with white spots on her tonsils, pain with swallowign and bilateral ear pain. Denies difficulty breathing or swallowing, SOB, CP, neck pain/stiffness.     Sinus Problem   This is a new problem. The current episode started in the past 7 days. The problem has been gradually worsening since onset. There has been no fever (99.0). Her pain is at a severity of 2/10. The pain is mild. Associated symptoms include congestion, ear pain, sneezing and a sore throat. Pertinent negatives include no chills, coughing, headaches, shortness of breath or sinus pressure. The treatment provided mild relief.       Constitution: Positive for fatigue and fever. Negative for chills.   HENT: Positive for ear pain, congestion and sore throat. Negative for postnasal drip, sinus pain, sinus pressure, trouble swallowing and voice change.    Neck: Negative for painful lymph nodes.   Cardiovascular: Negative for chest pain and leg swelling.   Eyes: Negative for double vision and blurred vision.   Respiratory: Negative for cough and shortness of breath.    Gastrointestinal: Negative for nausea, vomiting and diarrhea.   Genitourinary: Negative for dysuria, frequency, urgency and history of kidney stones.   Musculoskeletal: Negative for joint pain, joint swelling, muscle cramps and muscle ache.   Skin: Negative for color change, pale, rash and bruising.   Allergic/Immunologic: Positive for sneezing. Negative for seasonal allergies.   Neurological: Negative for dizziness, history of vertigo, light-headedness, passing out and headaches.   Hematologic/Lymphatic: Negative for swollen lymph nodes. "   Psychiatric/Behavioral: Negative for nervous/anxious, sleep disturbance and depression. The patient is not nervous/anxious.        Objective:      Physical Exam   Constitutional: She is oriented to person, place, and time. Vital signs are normal. She appears well-developed and well-nourished. She is cooperative.  Non-toxic appearance. She does not have a sickly appearance. She appears ill. No distress.   Patient is sitting pleasantly on exam table in no acute distress. Nontoxic appearing.    HENT:   Head: Normocephalic and atraumatic.   Right Ear: Hearing, external ear and ear canal normal. A middle ear effusion is present.   Left Ear: Hearing, external ear and ear canal normal. A middle ear effusion is present.   Nose: Nose normal. No mucosal edema, rhinorrhea or nasal deformity. No epistaxis. Right sinus exhibits no maxillary sinus tenderness and no frontal sinus tenderness. Left sinus exhibits no maxillary sinus tenderness and no frontal sinus tenderness.   Mouth/Throat: Uvula is midline and mucous membranes are normal. No trismus in the jaw. Normal dentition. No uvula swelling. Posterior oropharyngeal edema and posterior oropharyngeal erythema present. No oropharyngeal exudate or cobblestoning. Tonsils are 3+ on the right. Tonsils are 3+ on the left. Tonsillar exudate.   Eyes: Pupils are equal, round, and reactive to light. Conjunctivae and lids are normal. No scleral icterus.   Neck: Trachea normal, full passive range of motion without pain and phonation normal. Neck supple. No neck rigidity. No edema and no erythema present.   Cardiovascular: Normal rate, regular rhythm, normal heart sounds, intact distal pulses and normal pulses.   Pulmonary/Chest: Effort normal and breath sounds normal. No respiratory distress. She has no decreased breath sounds. She has no wheezes. She has no rhonchi.   Abdominal: Normal appearance.   Musculoskeletal: Normal range of motion. She exhibits no edema or deformity.    Lymphadenopathy:     She has cervical adenopathy.   Neurological: She is alert and oriented to person, place, and time. She exhibits normal muscle tone. Coordination normal.   Skin: Skin is warm, dry, intact, not diaphoretic and not pale.   Psychiatric: She has a normal mood and affect. Her speech is normal and behavior is normal. Judgment and thought content normal. Cognition and memory are normal.   Nursing note and vitals reviewed.        Results for orders placed or performed in visit on 02/14/20   POCT Strep A, Molecular   Result Value Ref Range    Molecular Strep A, POC Positive (A) Negative     Acceptable Yes      Advised on return/follow-up precautions. Advised on ER precautions. Answered all patient questions. Patient verbalized understanding and voiced agreement with current treatment plan.    Assessment:       1. Streptococcal pharyngitis    2. Sore throat        Plan:         Streptococcal pharyngitis  -     amoxicillin (AMOXIL) 875 MG tablet; Take 1 tablet (875 mg total) by mouth 2 (two) times daily. for 10 days  Dispense: 20 tablet; Refill: 0  -     (Magic mouthwash) 1:1:1 Benadryl 12.5mg/5ml liq, aluminum & magnesium hydroxide-simehticone (Maalox), lidocaine viscous 2%; Swish and spit 10 mLs every 4 (four) hours as needed. for mouth sores  Dispense: 120 mL; Refill: 0    Sore throat  -     POCT Strep A, Molecular      Patient Instructions       If your condition worsens or fails to improve we recommend that you receive another evaluation at the ER immediately or contact your PCP to discuss your concerns or return here. You must understand that you've received an urgent care treatment only and that you may be released before all your medical problems are known or treated. You the patient will arrange for followup care as instructed.     If the strep test performed in office was Positive:  - Complete the full course of antibiotics given  -  If you were prescribed antibiotics and are  female and on BCP use additional methods to prevent pregnancy while on the antibiotics and for one cycle after.  -  Take antibiotics with meals and to add yogurt or probiotic over the counter to diet to help prevent GI upset while taking antibiotic.  - Drink plenty of cool liquids while avoid any beverage or food that can irritate your throat (acidic, spicy or salty foods).  - Throw away your toothbrush now and when you complete your antibiotics.    Tylenol or ibuprofen for pain may help as long as you are not allergic to these meds or have a medical condition such as stomach ulcers, liver or kidney disease or taking blood thinners etc that would prevent you from using these medications.     Rest and fluids will help as well.                Pharyngitis: Strep (Confirmed)    You have had a positive test for strep throat. Strep throat is a contagious illness. It is spread by coughing, kissing or by touching others after touching your mouth or nose. Symptoms include throat pain that is worse with swallowing, aching all over, headache, and fever. It is treated with antibiotic medicine. This should help you start to feel better in 1 to 2 days.  Home care  · Rest at home. Drink plenty of fluids to you won't get dehydrated.  · No work or school for the first 2 days of taking the antibiotics. After this time, you will not be contagious. You can then return to school or work if you are feeling better.   · Take antibiotic medicine for the full 10 days, even if you feel better. This is very important to ensure the infection is treated. It is also important to prevent medicine-resistant germs from developing. If you were given an antibiotic shot, you don't need any more antibiotics.  · You may use acetaminophen or ibuprofen to control pain or fever, unless another medicine was prescribed for this. Talk with your doctor before taking these medicines if you have chronic liver or kidney disease. Also talk with your doctor if you  have had a stomach ulcer or GI bleeding.  · Throat lozenges or sprays help reduce pain. Gargling with warm saltwater will also reduce throat pain. Dissolve 1/2 teaspoon of salt in 1 glass of warm water. This may be useful just before meals.   · Soft foods are OK. Avoid salty or spicy foods.  Follow-up care  Follow up with your healthcare provider or our staff if you don't get better over the next week.  When to seek medical advice  Call your healthcare provider right away if any of these occur:  · Fever of 100.4ºF (38ºC) or higher, or as directed by your healthcare provider  · New or worsening ear pain, sinus pain, or headache  · Painful lumps in the back of neck  · Stiff neck  · Lymph nodes getting larger or becoming soft in the middle  · You can't swallow liquids or you can't open your mouth wide because of throat pain  · Signs of dehydration. These include very dark urine or no urine, sunken eyes, and dizziness.  · Trouble breathing or noisy breathing  · Muffled voice  · Rash  Date Last Reviewed: 4/13/2015  © 5310-3077 [employer]. [address] [place] [address]. All rights reserved. This information is not intended as a substitute for professional medical care. Always follow your healthcare professional's instructions.

## 2020-02-15 NOTE — PATIENT INSTRUCTIONS
If your condition worsens or fails to improve we recommend that you receive another evaluation at the ER immediately or contact your PCP to discuss your concerns or return here. You must understand that you've received an urgent care treatment only and that you may be released before all your medical problems are known or treated. You the patient will arrange for followup care as instructed.     If the strep test performed in office was Positive:  - Complete the full course of antibiotics given  -  If you were prescribed antibiotics and are female and on BCP use additional methods to prevent pregnancy while on the antibiotics and for one cycle after.  -  Take antibiotics with meals and to add yogurt or probiotic over the counter to diet to help prevent GI upset while taking antibiotic.  - Drink plenty of cool liquids while avoid any beverage or food that can irritate your throat (acidic, spicy or salty foods).  - Throw away your toothbrush now and when you complete your antibiotics.    Tylenol or ibuprofen for pain may help as long as you are not allergic to these meds or have a medical condition such as stomach ulcers, liver or kidney disease or taking blood thinners etc that would prevent you from using these medications.     Rest and fluids will help as well.                Pharyngitis: Strep (Confirmed)    You have had a positive test for strep throat. Strep throat is a contagious illness. It is spread by coughing, kissing or by touching others after touching your mouth or nose. Symptoms include throat pain that is worse with swallowing, aching all over, headache, and fever. It is treated with antibiotic medicine. This should help you start to feel better in 1 to 2 days.  Home care  · Rest at home. Drink plenty of fluids to you won't get dehydrated.  · No work or school for the first 2 days of taking the antibiotics. After this time, you will not be contagious. You can then return to school or work if you  are feeling better.   · Take antibiotic medicine for the full 10 days, even if you feel better. This is very important to ensure the infection is treated. It is also important to prevent medicine-resistant germs from developing. If you were given an antibiotic shot, you don't need any more antibiotics.  · You may use acetaminophen or ibuprofen to control pain or fever, unless another medicine was prescribed for this. Talk with your doctor before taking these medicines if you have chronic liver or kidney disease. Also talk with your doctor if you have had a stomach ulcer or GI bleeding.  · Throat lozenges or sprays help reduce pain. Gargling with warm saltwater will also reduce throat pain. Dissolve 1/2 teaspoon of salt in 1 glass of warm water. This may be useful just before meals.   · Soft foods are OK. Avoid salty or spicy foods.  Follow-up care  Follow up with your healthcare provider or our staff if you don't get better over the next week.  When to seek medical advice  Call your healthcare provider right away if any of these occur:  · Fever of 100.4ºF (38ºC) or higher, or as directed by your healthcare provider  · New or worsening ear pain, sinus pain, or headache  · Painful lumps in the back of neck  · Stiff neck  · Lymph nodes getting larger or becoming soft in the middle  · You can't swallow liquids or you can't open your mouth wide because of throat pain  · Signs of dehydration. These include very dark urine or no urine, sunken eyes, and dizziness.  · Trouble breathing or noisy breathing  · Muffled voice  · Rash  Date Last Reviewed: 4/13/2015  © 7515-1008 [employer]. [address] [place] [address], [place] [address]. All rights reserved. This information is not intended as a substitute for professional medical care. Always follow your healthcare professional's instructions.

## 2020-10-31 ENCOUNTER — HOSPITAL ENCOUNTER (EMERGENCY)
Facility: HOSPITAL | Age: 55
Discharge: HOME | End: 2020-10-31
Attending: EMERGENCY MEDICINE
Payer: OTHER GOVERNMENT

## 2020-10-31 VITALS
BODY MASS INDEX: 36.49 KG/M2 | OXYGEN SATURATION: 100 % | DIASTOLIC BLOOD PRESSURE: 65 MMHG | SYSTOLIC BLOOD PRESSURE: 112 MMHG | RESPIRATION RATE: 18 BRPM | WEIGHT: 240 LBS | HEART RATE: 50 BPM | TEMPERATURE: 98 F

## 2020-10-31 DIAGNOSIS — M62.838 NECK MUSCLE SPASM: Primary | ICD-10-CM

## 2020-10-31 DIAGNOSIS — M62.838 TRAPEZIUS MUSCLE SPASM: ICD-10-CM

## 2020-10-31 DIAGNOSIS — M54.2 NECK PAIN: ICD-10-CM

## 2020-10-31 LAB
ALBUMIN SERPL-MCNC: 3.7 G/DL (ref 3.3–5.5)
ALP SERPL-CCNC: 74 U/L (ref 42–141)
BILIRUB SERPL-MCNC: 0.6 MG/DL (ref 0.2–1.6)
BUN SERPL-MCNC: 9 MG/DL (ref 7–22)
CALCIUM SERPL-MCNC: 9.5 MG/DL (ref 8–10.3)
CHLORIDE SERPL-SCNC: 106 MMOL/L (ref 98–108)
CREAT SERPL-MCNC: 0.8 MG/DL (ref 0.6–1.2)
GLUCOSE SERPL-MCNC: 86 MG/DL (ref 73–118)
POC ALT (SGPT): 34 U/L (ref 10–47)
POC AST (SGOT): 32 U/L (ref 11–38)
POC CARDIAC TROPONIN I: 0 NG/ML
POC TCO2: 29 MMOL/L (ref 18–33)
POTASSIUM BLD-SCNC: 3.3 MMOL/L (ref 3.6–5.1)
PROTEIN, POC: 7.7 G/DL (ref 6.4–8.1)
SAMPLE: NORMAL
SODIUM BLD-SCNC: 143 MMOL/L (ref 128–145)

## 2020-10-31 PROCEDURE — 93005 ELECTROCARDIOGRAM TRACING: CPT | Mod: ER

## 2020-10-31 PROCEDURE — 93010 EKG 12-LEAD: ICD-10-PCS | Mod: ,,, | Performed by: INTERNAL MEDICINE

## 2020-10-31 PROCEDURE — 99284 EMERGENCY DEPT VISIT MOD MDM: CPT | Mod: 25,ER

## 2020-10-31 PROCEDURE — 63600175 PHARM REV CODE 636 W HCPCS: Mod: ER | Performed by: PHYSICIAN ASSISTANT

## 2020-10-31 PROCEDURE — 80053 COMPREHEN METABOLIC PANEL: CPT | Mod: ER

## 2020-10-31 PROCEDURE — 85025 COMPLETE CBC W/AUTO DIFF WBC: CPT | Mod: ER

## 2020-10-31 PROCEDURE — 84484 ASSAY OF TROPONIN QUANT: CPT | Mod: ER

## 2020-10-31 PROCEDURE — 25000003 PHARM REV CODE 250: Mod: ER | Performed by: PHYSICIAN ASSISTANT

## 2020-10-31 PROCEDURE — 93010 ELECTROCARDIOGRAM REPORT: CPT | Mod: ,,, | Performed by: INTERNAL MEDICINE

## 2020-10-31 PROCEDURE — 96374 THER/PROPH/DIAG INJ IV PUSH: CPT | Mod: ER

## 2020-10-31 RX ORDER — CYCLOBENZAPRINE HCL 10 MG
10 TABLET ORAL 3 TIMES DAILY PRN
Qty: 15 TABLET | Refills: 0 | Status: SHIPPED | OUTPATIENT
Start: 2020-10-31 | End: 2020-11-05

## 2020-10-31 RX ORDER — LIDOCAINE 50 MG/G
1 PATCH TOPICAL DAILY
Qty: 15 PATCH | Refills: 0 | Status: SHIPPED | OUTPATIENT
Start: 2020-10-31 | End: 2023-05-29

## 2020-10-31 RX ORDER — CYCLOBENZAPRINE HCL 10 MG
10 TABLET ORAL
Status: COMPLETED | OUTPATIENT
Start: 2020-10-31 | End: 2020-10-31

## 2020-10-31 RX ORDER — IBUPROFEN 600 MG/1
600 TABLET ORAL EVERY 6 HOURS PRN
Qty: 20 TABLET | Refills: 0 | Status: SHIPPED | OUTPATIENT
Start: 2020-10-31 | End: 2023-02-16

## 2020-10-31 RX ORDER — KETOROLAC TROMETHAMINE 30 MG/ML
10 INJECTION, SOLUTION INTRAMUSCULAR; INTRAVENOUS
Status: COMPLETED | OUTPATIENT
Start: 2020-10-31 | End: 2020-10-31

## 2020-10-31 RX ORDER — LIDOCAINE 50 MG/G
1 PATCH TOPICAL
Status: DISCONTINUED | OUTPATIENT
Start: 2020-10-31 | End: 2020-10-31 | Stop reason: HOSPADM

## 2020-10-31 RX ADMIN — CYCLOBENZAPRINE 10 MG: 10 TABLET, FILM COATED ORAL at 03:10

## 2020-10-31 RX ADMIN — LIDOCAINE 1 PATCH: 50 PATCH TOPICAL at 03:10

## 2020-10-31 RX ADMIN — KETOROLAC TROMETHAMINE 10 MG: 30 INJECTION, SOLUTION INTRAMUSCULAR; INTRAVENOUS at 03:10

## 2020-10-31 NOTE — ED PROVIDER NOTES
"Encounter Date: 10/31/2020    SCRIBE #1 NOTE: I, Eunice Vo, am scribing for, and in the presence of,  CHRISTIE Morales. I have scribed the following portions of the note - Other sections scribed: HPI, ROS, PE.       History     Chief Complaint   Patient presents with    Neck Pain     Pt states," I have pains in my neck that goes to my left shoulder for one week."     Shiloh Thayer is a 55 y.o. female who presents to the ED complaining of worsening neck pain radiating to the left shoulder and left arm when she turns her head onset one week ago. Also complains of intermittent blurriness that lasts for a few minutes and light-headedness. Denies trauma or injury. States that pain keeps her up at night. Reports pain with sitting. Patient thought she slept wrong and states the pain was only dull and aching at first. Denies headache, numbness, or weakness. Reports history of HTN controlled with medication, hydrochlorothiazide and amlodipine. Reports history of HLN. Reports surgical history of hysterectomy and cholecystectomy. No known allergies.    The history is provided by the patient. No  was used.     Review of patient's allergies indicates:  No Known Allergies  Past Medical History:   Diagnosis Date    Glaucoma     Hyperlipidemia     Hypertension     Prediabetes      Past Surgical History:   Procedure Laterality Date    CHOLECYSTECTOMY      HYSTERECTOMY       History reviewed. No pertinent family history.  Social History     Tobacco Use    Smoking status: Never Smoker    Smokeless tobacco: Never Used   Substance Use Topics    Alcohol use: Yes     Alcohol/week: 2.0 standard drinks     Types: 2 Glasses of wine per week    Drug use: Not Currently     Review of Systems   Constitutional: Negative for fever.   HENT: Negative for rhinorrhea and sore throat.    Eyes: Positive for visual disturbance. Negative for redness.   Respiratory: Negative for shortness of breath.    Cardiovascular: " Negative for chest pain and leg swelling.   Gastrointestinal: Negative for abdominal pain, diarrhea, nausea and vomiting.   Genitourinary: Negative for dysuria.   Musculoskeletal: Positive for neck pain (radiating to left shoulder and left arm). Negative for back pain.   Skin: Negative for rash.   Neurological: Positive for light-headedness. Negative for syncope, weakness, numbness and headaches.       Physical Exam     Initial Vitals [10/31/20 1436]   BP Pulse Resp Temp SpO2   (!) 170/99 66 16 98.2 °F (36.8 °C) 100 %      MAP       --         Physical Exam    Nursing note and vitals reviewed.  Constitutional: She appears well-developed and well-nourished.   HENT:   Head: Normocephalic and atraumatic.   Eyes: Conjunctivae are normal.   Neck: Normal range of motion. Neck supple.   Cardiovascular: Normal rate and intact distal pulses.   Pulmonary/Chest: Effort normal. No respiratory distress.   Musculoskeletal: Normal range of motion.      Cervical back: She exhibits tenderness. She exhibits no bony tenderness.      Comments: Left-sided trapezius muscle tenderness.   Neurological: She is alert and oriented to person, place, and time. She has normal strength. No cranial nerve deficit or sensory deficit.   No focal or neurological deficits. No pronator drift. Normal finger to nose. Normal heel to shin. 5/5 strength in bilateral upper and lower extremities.   Skin: Skin is warm and dry.   Psychiatric: She has a normal mood and affect.         ED Course   Procedures  Labs Reviewed   POCT CMP - Abnormal; Notable for the following components:       Result Value    POC Potassium 3.3 (*)     All other components within normal limits   TROPONIN ISTAT   POCT CBC   POCT CMP   POCT TROPONIN            ECG Results          EKG 12-lead (Final result)  Result time 11/01/20 11:54:08    Final result by Interface, Lab In [facility] (11/01/20 11:54:08)                 Narrative:    Test Reason : M54.2,    Vent. Rate : 051 BPM     Atrial  Rate : 051 BPM     P-R Int : 184 ms          QRS Dur : 094 ms      QT Int : 454 ms       P-R-T Axes : 020 000 022 degrees     QTc Int : 418 ms    Sinus bradycardia  Moderate voltage criteria for LVH, may be normal variant  Borderline Abnormal ECG  No previous ECGs available  Confirmed by Som Bethea MD ([id number]) on 11/1/2020 11:53:56 AM    Referred By: [email or web address]   SELF           Confirmed By:Som Bethea MD                            Imaging Results    None          Medical Decision Making:   History:   Old Medical Records: I decided to obtain old medical records.  Independently Interpreted Test(s):   I have ordered and independently interpreted EKG Reading(s) - see prior notes  Clinical Tests:   Lab Tests: Reviewed and Ordered  Medical Tests: Reviewed and Ordered  ED Management:  Hemodynamically stable.  Nontoxic and in no acute distress.  Patient is overall well-appearing, pleasant, conversational.  Mild hypokalemia of 3.3.  Repleted with p.o. potassium in the ED. EKG shows sinus Ricky, no STEMI.  Troponin undetectable.  Patient reports improvement of symptoms after medications.  Will discharge home with PCP and cardiology follow-up.  Strict ED return precautions given for any worsening or additional concerning symptoms.  Patient verbalizes understanding and is agreeable with plan.            Scribe Attestation:   Scribe #1: I performed the above scribed service and the documentation accurately describes the services I performed. I attest to the accuracy of the note.    Scribe attestation: I, KATE WILL, personally performed the services described in this documentation.  All medical record entries made by the scribe were at my direction and in my presence.  I have reviewed the chart and agree that the record reflects my personal performance and is accurate and complete.                    Clinical Impression:     ICD-10-CM ICD-9-CM   1. Neck muscle spasm  M62.838 728.85   2. Neck pain  M54.2 723.1   3. Trapezius  muscle spasm  M62.838 728.85                      Disposition:   Disposition: Discharged  Condition: Stable     ED Disposition Condition    Discharge Stable        ED Prescriptions     Medication Sig Dispense Start Date End Date Auth. Provider    cyclobenzaprine (FLEXERIL) 10 MG tablet Take 1 tablet (10 mg total) by mouth 3 (three) times daily as needed. 15 tablet 10/31/2020 11/5/2020 Fernando Washington PA-C    ibuprofen (ADVIL,MOTRIN) 600 MG tablet Take 1 tablet (600 mg total) by mouth every 6 (six) hours as needed. 20 tablet 10/31/2020  Fernando Washington PA-C    lidocaine (LIDODERM) 5 % Place 1 patch onto the skin once daily. Remove & Discard patch within 12 hours or as directed by MD 15 patch 10/31/2020  Fernando Washington PA-C        Follow-up Information     Follow up With Specialties Details Why Contact Info    Lamont Patel MD Internal Medicine Schedule an appointment as soon as possible for a visit   [address]  [address]  [place] [address]  [phone]      [facility] Emergency Department Emergency Medicine Go to  If symptoms worsen [address] [facility] [address] [phone]                                       Fernando Washington PA-C  11/02/20 1038

## 2020-10-31 NOTE — DISCHARGE INSTRUCTIONS
PLEASE TAKE NEW MEDICATION AS DIRECTED AND FOLLOW DISCHARGE INSTRUCTIONS PROVIDED.  PLEASE MAKE SURE TO FOLLOW-UP WITH YOUR PCP ON MONDAY TO DISCUSS TODAY'S EMERGENCY DEPARTMENT VISIT AND FOR FURTHER EVALUATION AND MANAGEMENT.  PLEASE RETURN EMERGENCY DEPARTMENT IMMEDIATELY IF YOUR SYMPTOMS WORSEN OR YOU DEVELOP ANY ADDITIONAL CONCERNING SYMPTOMS.

## 2020-10-31 NOTE — ED NOTES
"Ambulatory to ER room 6 with steady gait; pt c/o L sided neck pain x 1 week; reports pain started radiating up neck and down to L shoulder approximately 3 days ago; states "I just don't feel well"; pt denies chest pain or shortness of breath; radiating pain to L shoulder occurs with movement, "if I don't move my neck, it doesn't hurt"; no acute distress noted; will monitor closely.  "

## 2021-03-06 ENCOUNTER — IMMUNIZATION (OUTPATIENT)
Dept: PRIMARY CARE CLINIC | Facility: CLINIC | Age: 56
End: 2021-03-06
Payer: OTHER GOVERNMENT

## 2021-03-06 DIAGNOSIS — Z23 NEED FOR VACCINATION: Primary | ICD-10-CM

## 2021-03-06 PROCEDURE — 91300 PR SARS-COV- 2 COVID-19 VACCINE, NO PRSV, 30MCG/0.3ML, IM: CPT | Mod: S$GLB,,, | Performed by: INTERNAL MEDICINE

## 2021-03-06 PROCEDURE — 0001A PR IMMUNIZ ADMIN, SARS-COV-2 COVID-19 VACC, 30MCG/0.3ML, 1ST DOSE: CPT | Mod: CV19,S$GLB,, | Performed by: INTERNAL MEDICINE

## 2021-03-06 PROCEDURE — 91300 PR SARS-COV- 2 COVID-19 VACCINE, NO PRSV, 30MCG/0.3ML, IM: ICD-10-PCS | Mod: S$GLB,,, | Performed by: INTERNAL MEDICINE

## 2021-03-06 PROCEDURE — 0001A PR IMMUNIZ ADMIN, SARS-COV-2 COVID-19 VACC, 30MCG/0.3ML, 1ST DOSE: ICD-10-PCS | Mod: CV19,S$GLB,, | Performed by: INTERNAL MEDICINE

## 2021-03-06 RX ADMIN — Medication 0.3 ML: at 10:03

## 2021-03-27 ENCOUNTER — IMMUNIZATION (OUTPATIENT)
Dept: PRIMARY CARE CLINIC | Facility: CLINIC | Age: 56
End: 2021-03-27
Payer: OTHER GOVERNMENT

## 2021-03-27 DIAGNOSIS — Z23 NEED FOR VACCINATION: Primary | ICD-10-CM

## 2021-03-27 PROCEDURE — 91300 PR SARS-COV- 2 COVID-19 VACCINE, NO PRSV, 30MCG/0.3ML, IM: CPT | Mod: S$GLB,,, | Performed by: INTERNAL MEDICINE

## 2021-03-27 PROCEDURE — 0002A PR IMMUNIZ ADMIN, SARS-COV-2 COVID-19 VACC, 30MCG/0.3ML, 2ND DOSE: ICD-10-PCS | Mod: CV19,S$GLB,, | Performed by: INTERNAL MEDICINE

## 2021-03-27 PROCEDURE — 0002A PR IMMUNIZ ADMIN, SARS-COV-2 COVID-19 VACC, 30MCG/0.3ML, 2ND DOSE: CPT | Mod: CV19,S$GLB,, | Performed by: INTERNAL MEDICINE

## 2021-03-27 PROCEDURE — 91300 PR SARS-COV- 2 COVID-19 VACCINE, NO PRSV, 30MCG/0.3ML, IM: ICD-10-PCS | Mod: S$GLB,,, | Performed by: INTERNAL MEDICINE

## 2021-03-27 RX ADMIN — Medication 0.3 ML: at 10:03

## 2021-12-04 ENCOUNTER — IMMUNIZATION (OUTPATIENT)
Dept: INTERNAL MEDICINE | Facility: CLINIC | Age: 56
End: 2021-12-04
Payer: OTHER GOVERNMENT

## 2021-12-04 DIAGNOSIS — Z23 NEED FOR VACCINATION: Primary | ICD-10-CM

## 2021-12-04 PROCEDURE — 0004A COVID-19, MRNA, LNP-S, PF, 30 MCG/0.3 ML DOSE VACCINE: CPT | Mod: CV19,PBBFAC | Performed by: INTERNAL MEDICINE

## 2022-06-14 ENCOUNTER — IMMUNIZATION (OUTPATIENT)
Dept: INTERNAL MEDICINE | Facility: CLINIC | Age: 57
End: 2022-06-14
Payer: OTHER GOVERNMENT

## 2022-06-14 DIAGNOSIS — Z23 NEED FOR VACCINATION: Primary | ICD-10-CM

## 2022-06-14 PROCEDURE — 91305 COVID-19, MRNA, LNP-S, PF, 30 MCG/0.3 ML DOSE VACCINE (PFIZER): CPT | Mod: PBBFAC | Performed by: INTERNAL MEDICINE

## 2022-10-25 ENCOUNTER — IMMUNIZATION (OUTPATIENT)
Dept: INTERNAL MEDICINE | Facility: CLINIC | Age: 57
End: 2022-10-25
Payer: OTHER GOVERNMENT

## 2022-10-25 DIAGNOSIS — Z23 NEED FOR VACCINATION: Primary | ICD-10-CM

## 2022-10-25 PROCEDURE — 91312 COVID-19, MRNA, LNP-S, BIVALENT BOOSTER, PF, 30 MCG/0.3 ML DOSE: CPT | Mod: S$GLB,,, | Performed by: INTERNAL MEDICINE

## 2022-10-25 PROCEDURE — 0124A COVID-19, MRNA, LNP-S, BIVALENT BOOSTER, PF, 30 MCG/0.3 ML DOSE: CPT | Mod: CV19,PBBFAC | Performed by: INTERNAL MEDICINE

## 2022-10-25 PROCEDURE — 91312 COVID-19, MRNA, LNP-S, BIVALENT BOOSTER, PF, 30 MCG/0.3 ML DOSE: ICD-10-PCS | Mod: S$GLB,,, | Performed by: INTERNAL MEDICINE

## 2022-11-01 ENCOUNTER — IMMUNIZATION (OUTPATIENT)
Dept: INTERNAL MEDICINE | Facility: CLINIC | Age: 57
End: 2022-11-01
Payer: OTHER GOVERNMENT

## 2022-11-01 PROCEDURE — 90686 FLU VACCINE (QUAD) GREATER THAN OR EQUAL TO 3YO PRESERVATIVE FREE IM: ICD-10-PCS | Mod: S$GLB,,, | Performed by: INTERNAL MEDICINE

## 2022-11-01 PROCEDURE — 90471 IMMUNIZATION ADMIN: CPT | Mod: S$GLB,,, | Performed by: INTERNAL MEDICINE

## 2022-11-01 PROCEDURE — 90686 IIV4 VACC NO PRSV 0.5 ML IM: CPT | Mod: S$GLB,,, | Performed by: INTERNAL MEDICINE

## 2022-11-01 PROCEDURE — 90471 FLU VACCINE (QUAD) GREATER THAN OR EQUAL TO 3YO PRESERVATIVE FREE IM: ICD-10-PCS | Mod: S$GLB,,, | Performed by: INTERNAL MEDICINE

## 2023-02-16 ENCOUNTER — OFFICE VISIT (OUTPATIENT)
Dept: URGENT CARE | Facility: CLINIC | Age: 58
End: 2023-02-16
Payer: OTHER GOVERNMENT

## 2023-02-16 VITALS
WEIGHT: 240 LBS | BODY MASS INDEX: 36.37 KG/M2 | TEMPERATURE: 98 F | SYSTOLIC BLOOD PRESSURE: 112 MMHG | HEART RATE: 62 BPM | HEIGHT: 68 IN | DIASTOLIC BLOOD PRESSURE: 65 MMHG | RESPIRATION RATE: 18 BRPM | OXYGEN SATURATION: 97 %

## 2023-02-16 DIAGNOSIS — M19.011 OSTEOARTHRITIS OF RIGHT SHOULDER, UNSPECIFIED OSTEOARTHRITIS TYPE: ICD-10-CM

## 2023-02-16 DIAGNOSIS — M25.511 ACUTE PAIN OF RIGHT SHOULDER: Primary | ICD-10-CM

## 2023-02-16 DIAGNOSIS — M77.8 TENDINITIS OF RIGHT SHOULDER: ICD-10-CM

## 2023-02-16 PROCEDURE — 99203 OFFICE O/P NEW LOW 30 MIN: CPT | Mod: 25,S$GLB,,

## 2023-02-16 PROCEDURE — 96372 THER/PROPH/DIAG INJ SC/IM: CPT | Mod: S$GLB,,,

## 2023-02-16 PROCEDURE — 73030 X-RAY EXAM OF SHOULDER: CPT | Mod: RT,S$GLB,, | Performed by: RADIOLOGY

## 2023-02-16 PROCEDURE — 73030 XR SHOULDER TRAUMA 3 VIEW RIGHT: ICD-10-PCS | Mod: RT,S$GLB,, | Performed by: RADIOLOGY

## 2023-02-16 PROCEDURE — 96372 PR INJECTION,THERAP/PROPH/DIAG2ST, IM OR SUBCUT: ICD-10-PCS | Mod: S$GLB,,,

## 2023-02-16 PROCEDURE — 99203 PR OFFICE/OUTPT VISIT, NEW, LEVL III, 30-44 MIN: ICD-10-PCS | Mod: 25,S$GLB,,

## 2023-02-16 RX ORDER — KETOROLAC TROMETHAMINE 30 MG/ML
30 INJECTION, SOLUTION INTRAMUSCULAR; INTRAVENOUS
Status: COMPLETED | OUTPATIENT
Start: 2023-02-16 | End: 2023-02-16

## 2023-02-16 RX ORDER — CYCLOBENZAPRINE HCL 10 MG
10 TABLET ORAL 3 TIMES DAILY PRN
Qty: 30 TABLET | Refills: 0 | Status: SHIPPED | OUTPATIENT
Start: 2023-02-16 | End: 2023-02-26

## 2023-02-16 RX ORDER — IBUPROFEN 600 MG/1
600 TABLET ORAL EVERY 8 HOURS PRN
Qty: 30 TABLET | Refills: 0 | Status: SHIPPED | OUTPATIENT
Start: 2023-02-16 | End: 2023-10-06 | Stop reason: ALTCHOICE

## 2023-02-16 RX ADMIN — KETOROLAC TROMETHAMINE 30 MG: 30 INJECTION, SOLUTION INTRAMUSCULAR; INTRAVENOUS at 09:02

## 2023-02-16 NOTE — PROGRESS NOTES
"Subjective:       Patient ID: Shiloh Thayer is a 57 y.o. female.    Vitals:  height is 5' 8" (1.727 m) and weight is 108.9 kg (240 lb). Her temperature is 98.1 °F (36.7 °C). Her blood pressure is 112/65 and her pulse is 62. Her respiration is 18 and oxygen saturation is 97%.     Chief Complaint: Shoulder Pain    Pt is a 56 y/o F who presents with acute nontraumatic right shoulder pain that started yesterday morning.  Right shoulder feels stiff and she has difficulty moving it.  Pain does not radiate. Patients states she normally sleeps on her R side, unsure if she may have slept wrong. Two aleve last night. She denies any fever, numbness, tingling, focal weakness, loss of sensation, neck pain or stiffness, headache, erythema, bruising, joint swelling, CP, SoB.    Shoulder Pain   The pain is present in the left shoulder. This is a new problem. The current episode started in the past 7 days. The problem has been gradually worsening. The quality of the pain is described as aching. The pain is at a severity of 7/10. Associated symptoms include joint locking. Pertinent negatives include no fever, headaches, inability to bear weight or numbness. She has tried acetaminophen for the symptoms.     Constitution: Negative for fever.   Neck: Negative for neck pain and neck stiffness.   Cardiovascular:  Negative for chest pain.   Respiratory:  Negative for cough and shortness of breath.    Gastrointestinal:  Negative for abdominal pain, nausea and vomiting.   Musculoskeletal:  Positive for joint pain and muscle ache. Negative for joint swelling.   Skin:  Negative for rash, erythema and bruising.   Neurological:  Negative for dizziness, headaches, numbness and tingling.     Objective:      Physical Exam   Constitutional: She is oriented to person, place, and time. She appears well-developed.   HENT:   Head: Normocephalic and atraumatic. Head is without abrasion, without contusion and without laceration.   Ears:   Right Ear: " External ear normal.   Left Ear: External ear normal.   Nose: Nose normal.   Mouth/Throat: Oropharynx is clear and moist and mucous membranes are normal.   Eyes: Conjunctivae, EOM and lids are normal. Pupils are equal, round, and reactive to light.   Neck: Trachea normal and phonation normal. Neck supple.      Comments: FROM of cervical spine without midine. No midline ttp. No neck rigidity present.   Cardiovascular: Normal rate, regular rhythm and normal heart sounds.   Pulmonary/Chest: Effort normal and breath sounds normal. No stridor. No respiratory distress. She exhibits no tenderness.   Musculoskeletal:         General: Tenderness present. No swelling or signs of injury.      Cervical back: She exhibits no tenderness.      Comments: ttp to anterior R shoulder. Limited ROM of R shoulder. FROM and 5/5 strength of R elbow and wrist.  strength equal BL. NVIT distally.   Neurological: She is alert and oriented to person, place, and time.   Skin: Skin is warm, dry, intact and no rash. Capillary refill takes less than 2 seconds. No abrasion, No burn, No bruising, No erythema and No ecchymosis   Psychiatric: Her speech is normal and behavior is normal. Judgment and thought content normal.   Nursing note and vitals reviewed.      XR SHOULDER TRAUMA 3 VIEW RIGHT    Result Date: 2/16/2023  EXAMINATION: XR SHOULDER TRAUMA 3 VIEW RIGHT CLINICAL HISTORY: Pain in right shoulder. TECHNIQUE: Three or four views of the right shoulder were performed. COMPARISON: None. FINDINGS: No acute displaced fracture.  No dislocation.  Moderate degenerative changes in the acromioclavicular joint.  Small calcification noted along the lateral aspect of the proximal humerus.  No unexpected radiopaque foreign body.     Degenerative changes in the acromioclavicular joint and small calcification along the lateral aspect of the proximal humerus.  Suggest correlation for any findings of calcific tendinitis. No acute displaced fracture.  Electronically signed by: Keith Garcia MD Date:    02/16/2023 Time:    10:32     Assessment:       1. Acute pain of right shoulder    2. Osteoarthritis of right shoulder, unspecified osteoarthritis type    3. Tendinitis of right shoulder          Plan:         Acute pain of right shoulder  -     XR SHOULDER TRAUMA 3 VIEW RIGHT; Future; Expected date: 02/16/2023  -     ketorolac injection 30 mg  -     ibuprofen (ADVIL,MOTRIN) 600 MG tablet; Take 1 tablet (600 mg total) by mouth every 8 (eight) hours as needed for Pain.  Dispense: 30 tablet; Refill: 0  -     cyclobenzaprine (FLEXERIL) 10 MG tablet; Take 1 tablet (10 mg total) by mouth 3 (three) times daily as needed for Muscle spasms.  Dispense: 30 tablet; Refill: 0    Osteoarthritis of right shoulder, unspecified osteoarthritis type    Tendinitis of right shoulder                   Patient Instructions     Ibuprofen for pain  Warm compresses     Please return or see your primary care doctor if you develop new or worsening symptoms.      You must understand that you've received an Urgent Care treatment only and that you may be released before all of your medical problems are known or treated. You, the patient, will arrange for follow up as instructed. If your symptoms worsen or fail to improve you should go to the Emergency Room.     If you are give a referral to specialist, please conform your appointment by calling [phone].

## 2023-02-16 NOTE — PATIENT INSTRUCTIONS
Ibuprofen for pain  Warm compresses     Please return or see your primary care doctor if you develop new or worsening symptoms.      You must understand that you've received an Urgent Care treatment only and that you may be released before all of your medical problems are known or treated. You, the patient, will arrange for follow up as instructed. If your symptoms worsen or fail to improve you should go to the Emergency Room.     If you are give a referral to specialist, please conform your appointment by calling [phone].

## 2023-05-29 ENCOUNTER — HOSPITAL ENCOUNTER (OUTPATIENT)
Dept: RADIOLOGY | Facility: HOSPITAL | Age: 58
Discharge: HOME | End: 2023-05-29
Attending: PHYSICIAN ASSISTANT
Payer: OTHER GOVERNMENT

## 2023-05-29 ENCOUNTER — OFFICE VISIT (OUTPATIENT)
Dept: INTERNAL MEDICINE | Facility: CLINIC | Age: 58
End: 2023-05-29
Payer: OTHER GOVERNMENT

## 2023-05-29 VITALS
OXYGEN SATURATION: 95 % | BODY MASS INDEX: 37.36 KG/M2 | DIASTOLIC BLOOD PRESSURE: 70 MMHG | HEIGHT: 68 IN | SYSTOLIC BLOOD PRESSURE: 108 MMHG | HEART RATE: 67 BPM | WEIGHT: 246.5 LBS

## 2023-05-29 DIAGNOSIS — R53.83 FATIGUE, UNSPECIFIED TYPE: ICD-10-CM

## 2023-05-29 DIAGNOSIS — G89.29 CHRONIC PAIN OF BOTH KNEES: Primary | ICD-10-CM

## 2023-05-29 DIAGNOSIS — M25.562 CHRONIC PAIN OF BOTH KNEES: ICD-10-CM

## 2023-05-29 DIAGNOSIS — M79.671 PAIN OF RIGHT HEEL: ICD-10-CM

## 2023-05-29 DIAGNOSIS — G89.29 CHRONIC PAIN OF BOTH KNEES: ICD-10-CM

## 2023-05-29 DIAGNOSIS — M25.561 CHRONIC PAIN OF BOTH KNEES: ICD-10-CM

## 2023-05-29 DIAGNOSIS — M79.604 BILATERAL LEG PAIN: ICD-10-CM

## 2023-05-29 DIAGNOSIS — M25.562 CHRONIC PAIN OF BOTH KNEES: Primary | ICD-10-CM

## 2023-05-29 DIAGNOSIS — G47.00 INSOMNIA, UNSPECIFIED TYPE: ICD-10-CM

## 2023-05-29 DIAGNOSIS — I10 HYPERTENSION, UNSPECIFIED TYPE: ICD-10-CM

## 2023-05-29 DIAGNOSIS — M79.605 BILATERAL LEG PAIN: ICD-10-CM

## 2023-05-29 DIAGNOSIS — M25.561 CHRONIC PAIN OF BOTH KNEES: Primary | ICD-10-CM

## 2023-05-29 DIAGNOSIS — E66.01 CLASS 2 SEVERE OBESITY DUE TO EXCESS CALORIES WITH SERIOUS COMORBIDITY AND BODY MASS INDEX (BMI) OF 37.0 TO 37.9 IN ADULT: ICD-10-CM

## 2023-05-29 DIAGNOSIS — E78.5 HYPERLIPIDEMIA, UNSPECIFIED HYPERLIPIDEMIA TYPE: ICD-10-CM

## 2023-05-29 PROCEDURE — 99204 PR OFFICE/OUTPT VISIT, NEW, LEVL IV, 45-59 MIN: ICD-10-PCS | Mod: S$GLB,,, | Performed by: PHYSICIAN ASSISTANT

## 2023-05-29 PROCEDURE — 99999 PR PBB SHADOW E&M-EST. PATIENT-LVL V: CPT | Mod: PBBFAC,,, | Performed by: PHYSICIAN ASSISTANT

## 2023-05-29 PROCEDURE — 73650 XR CALCANEUS 2 VIEW RIGHT: ICD-10-PCS | Mod: 26,RT,, | Performed by: RADIOLOGY

## 2023-05-29 PROCEDURE — 99204 OFFICE O/P NEW MOD 45 MIN: CPT | Mod: S$GLB,,, | Performed by: PHYSICIAN ASSISTANT

## 2023-05-29 PROCEDURE — 73650 X-RAY EXAM OF HEEL: CPT | Mod: TC,RT

## 2023-05-29 PROCEDURE — 73562 X-RAY EXAM OF KNEE 3: CPT | Mod: 26,50,, | Performed by: RADIOLOGY

## 2023-05-29 PROCEDURE — 73650 X-RAY EXAM OF HEEL: CPT | Mod: 26,RT,, | Performed by: RADIOLOGY

## 2023-05-29 PROCEDURE — 99999 PR PBB SHADOW E&M-EST. PATIENT-LVL V: ICD-10-PCS | Mod: PBBFAC,,, | Performed by: PHYSICIAN ASSISTANT

## 2023-05-29 PROCEDURE — 73562 X-RAY EXAM OF KNEE 3: CPT | Mod: TC,50

## 2023-05-29 PROCEDURE — 73562 XR KNEE 3 VIEW BILATERAL: ICD-10-PCS | Mod: 26,50,, | Performed by: RADIOLOGY

## 2023-05-29 RX ORDER — ATORVASTATIN CALCIUM 40 MG/1
40 TABLET, FILM COATED ORAL
COMMUNITY
Start: 2023-05-26 | End: 2023-06-01 | Stop reason: SDUPTHER

## 2023-05-29 RX ORDER — TIMOLOL MALEATE 5 MG/ML
1 SOLUTION/ DROPS OPHTHALMIC 2 TIMES DAILY
COMMUNITY
Start: 2023-05-22

## 2023-05-29 NOTE — PATIENT INSTRUCTIONS
Weight watchers program    [facility]     SCHEDULE APPOINTMENT WITH A MD TO FULLY ESTABLISH CARE WITHIN THE NEXT FEW MONTHS     Roger Bassett ([address] [place] [address]):  - Pedro Polanco MD    -[facility] marcos Chaves  -Stephen Dawson MD (Anastacio)        -Sujata Macario MD (Anastacio)        -Jaleel Mora MD (Whit)  -Reed Carrion MD (Whit)        -Safia Simpson MD (Epi)        -Benny Santana MD (Epi)

## 2023-05-29 NOTE — PROGRESS NOTES
Subjective:       Patient ID: Shiloh Thayer is a 57 y.o. female.    Chief Complaint: Foot Injury, Leg Pain (/), Insomnia (/), and Weight Gain      Established pt of Lamont Patel MD (new to me)    HPI        Feels like current PCP not addressing her concerns  Looking for guidance on how to better her health    Insomnia: years, wake up every am around 3 am, trouble falling back to sleep. +Fatigue +snoring. 5hrs of sleep a night. +racing thoughts(occ, when worrying about her children, not often),   She has tried reducing caffeine, cooling sheets, TV off and other sleep hygiene tips. Fatigue.     R Heel Pain: few months,  worse first steps in  the am or first step after prolonged sitttinghas tried different shoes, takes ibuprofen with mild relief,.     B Knee/ B Leg pain: descried as stiffness. Worse in the am and after prolonged sitting, better with activity. Occ swelling of knee (L>R). No n/t, weakness or burning sensation.     Obesity: has gained weight, needs guidance on diet. Prescribed ozempic by her PCP but was unable to obtain med.  Body mass index is 37.48 kg/m².      HLD: On Lipitor 40mg, inquires if she needs to take statin, prefers diet/exercise.     HTN: BP at goal on amlodipine 5mg, hctz 12.5mg     Past Medical History:   Diagnosis Date    Glaucoma     Hyperlipidemia     Hypertension     Prediabetes        Social History     Tobacco Use    Smoking status: Never    Smokeless tobacco: Never   Substance Use Topics    Alcohol use: Yes     Alcohol/week: 2.0 standard drinks     Types: 2 Glasses of wine per week    Drug use: Not Currently       Review of patient's allergies indicates:  No Known Allergies      Current Outpatient Medications:     amLODIPine (NORVASC) 5 MG tablet, Take 5 mg by mouth once daily., Disp: , Rfl: 3    atorvastatin (LIPITOR) 20 MG tablet, Take 20 mg by mouth every evening., Disp: , Rfl: 5    hydroCHLOROthiazide (HYDRODIURIL) 12.5 MG Tab, Take 12.5 mg by mouth once daily., Disp: ,  "Rfl: 4    ibuprofen (ADVIL,MOTRIN) 600 MG tablet, Take 1 tablet (600 mg total) by mouth every 8 (eight) hours as needed for Pain., Disp: 30 tablet, Rfl: 0    latanoprost 0.005 % ophthalmic solution, , Disp: , Rfl:     lidocaine (LIDODERM) 5 %, Place 1 patch onto the skin once daily. Remove & Discard patch within 12 hours or as directed by MD (Patient not taking: Reported on 5/29/2023), Disp: 15 patch, Rfl: 0    semaglutide (OZEMPIC) 0.25 mg or 0.5 mg(2 mg/1.5 mL) PnIj, Inject 0.5 mg into the skin every 7 days., Disp: 4.5 mL, Rfl: 1    Family History   Problem Relation Age of Onset    Diabetes Mother     Hypertension Mother        Past Surgical History:   Procedure Laterality Date    CHOLECYSTECTOMY      HYSTERECTOMY         Review of Systems   Constitutional:  Positive for unexpected weight change (weight gain). Negative for chills and fever.   Respiratory:  Negative for cough and shortness of breath.    Cardiovascular:  Negative for chest pain and leg swelling.   Gastrointestinal:  Negative for abdominal pain, nausea and vomiting.   Musculoskeletal:  Positive for arthralgias and leg pain.   Integumentary:  Negative for rash.   Neurological:  Negative for weakness and headaches.     Objective: /70 (BP Location: Left arm, Patient Position: Sitting, BP Method: Large (Manual))   Pulse 67   Ht 5' 8" (1.727 m)   Wt 111.8 kg (246 lb 7.6 oz)   LMP  (LMP Unknown) Comment: hysterectomy   SpO2 95%   BMI 37.48 kg/m²         Physical Exam  Vitals reviewed.   Constitutional:       General: She is not in acute distress.     Appearance: She is well-developed. She is not ill-appearing.   HENT:      Head: Normocephalic and atraumatic.   Cardiovascular:      Rate and Rhythm: Normal rate and regular rhythm.      Pulses:           Dorsalis pedis pulses are 2+ on the right side and 2+ on the left side.        Posterior tibial pulses are 2+ on the right side and 2+ on the left side.   Pulmonary:      Effort: Pulmonary effort " is normal.      Breath sounds: Normal breath sounds. No wheezing or rales.   Musculoskeletal:      Right knee: Crepitus present. No effusion or erythema. Decreased range of motion.      Left knee: Swelling and crepitus present. No effusion or erythema. Decreased range of motion.      Right lower leg: No edema.      Left lower leg: No edema.        Feet:    Feet:      Right foot:      Skin integrity: No ulcer, blister or skin breakdown.      Left foot:      Skin integrity: No ulcer, blister or skin breakdown.   Lymphadenopathy:      Cervical: No cervical adenopathy.   Skin:     General: Skin is warm and dry.      Findings: No rash.   Neurological:      Mental Status: She is alert.   Psychiatric:         Mood and Affect: Mood normal.       Assessment:       1. Chronic pain of both knees    2. Hyperlipidemia, unspecified hyperlipidemia type    3. Insomnia, unspecified type    4. Fatigue, unspecified type    5. Bilateral leg pain    6. Class 2 severe obesity due to excess calories with serious comorbidity and body mass index (BMI) of 37.0 to 37.9 in adult    7. Pain of right heel    8. Hypertension, unspecified type        Plan:             Shiloh was seen today for foot injury, leg pain, insomnia and weight gain.    Diagnoses and all orders for this visit:      1. Chronic pain of both knees  OTC analgesics prn and physical therapy  Xray today  -     X-Ray Knee 3 View Bilateral; Future; Expected date: 05/29/2023  -     Ambulatory referral/consult to Physical/Occupational Therapy; Future; Expected date: 06/05/2023    2. Hyperlipidemia, unspecified hyperlipidemia type  Follow up results for statin recs  -     Comprehensive Metabolic Panel; Future; Expected date: 05/29/2023    3. Insomnia, unspecified type  4. Fatigue, unspecified type  Eval for possible JACKIE  -     Ambulatory referral/consult to Sleep Disorders; Future; Expected date: 06/05/2023      5. Bilateral leg pain  OTC analgesics prn and physical therapy  -      Ambulatory referral/consult to Physical/Occupational Therapy; Future; Expected date: 06/05/2023    6. Class 2 severe obesity due to excess calories with serious comorbidity and body mass index (BMI) of 37.0 to 37.9 in adult  BMI reviewed  Diet/lifestyle mods discussed on reducing caloric intake ([employer], weight watcher reviewed BMR on apple watch)  -     Hemoglobin A1C; Future; Expected date: 05/29/2023  -     TSH; Future; Expected date: 05/29/2023  -     Lipid Panel; Future; Expected date: 05/29/2023    7. Pain of right heel  Likely plantar fasciitis  Handout of conservative treatment/stretching  -     X-Ray Calcaneus 2 View Right; Future; Expected date: 05/29/2023    8. Hypertension, unspecified type  At goal, continue current meds  -     CBC Auto Differential; Future; Expected date: 05/29/2023  -     Comprehensive Metabolic Panel; Future; Expected date: 05/29/2023        I spent a total of 47 minutes on the day of the visit.  This includes face to face time and non-face to face time preparing to see the patient (eg, review of tests), obtaining and/or reviewing separately obtained history, documenting clinical information in the electronic or other health record, independently interpreting results and communicating results to the patient/family/caregiver, or care coordinator.    Discussed need to choose MD as PCP to fully establish care with our practice site (list provided)      Chelsea Mcduffie PA-C

## 2023-05-31 ENCOUNTER — CLINICAL SUPPORT (OUTPATIENT)
Dept: REHABILITATION | Facility: HOSPITAL | Age: 58
End: 2023-05-31
Payer: OTHER GOVERNMENT

## 2023-05-31 DIAGNOSIS — G89.29 CHRONIC PAIN OF BOTH KNEES: ICD-10-CM

## 2023-05-31 DIAGNOSIS — R26.9 GAIT DIFFICULTY: ICD-10-CM

## 2023-05-31 DIAGNOSIS — M25.562 CHRONIC PAIN OF BOTH KNEES: ICD-10-CM

## 2023-05-31 DIAGNOSIS — M25.661 DECREASED RANGE OF MOTION OF BOTH KNEES: ICD-10-CM

## 2023-05-31 DIAGNOSIS — M79.604 BILATERAL LEG PAIN: ICD-10-CM

## 2023-05-31 DIAGNOSIS — M25.561 CHRONIC PAIN OF BOTH KNEES: ICD-10-CM

## 2023-05-31 DIAGNOSIS — M79.605 BILATERAL LEG PAIN: ICD-10-CM

## 2023-05-31 DIAGNOSIS — R29.898 WEAKNESS OF BOTH LOWER EXTREMITIES: ICD-10-CM

## 2023-05-31 DIAGNOSIS — M25.662 DECREASED RANGE OF MOTION OF BOTH KNEES: ICD-10-CM

## 2023-05-31 PROCEDURE — 97110 THERAPEUTIC EXERCISES: CPT | Mod: PN

## 2023-05-31 PROCEDURE — 97161 PT EVAL LOW COMPLEX 20 MIN: CPT | Mod: PN

## 2023-05-31 NOTE — PLAN OF CARE
[facility]  Physical Therapy Initial Evaluation    Date: 5/31/2023   Name: Shiloh Thayer  Clinic Number: [id number]    Therapy Diagnosis:   Encounter Diagnoses   Name Primary?    Chronic pain of both knees     Bilateral leg pain     Weakness of both lower extremities     Decreased range of motion of both knees     Gait difficulty      Physician: Chelsea Mcduffie PA-C    Physician Orders: PT Eval and Treat   Medical Diagnosis from Referral:   M25.561,M25.562,G89.29 (ICD-10-CM) - Chronic pain of both knees   M79.604,M79.605 (ICD-10-CM) - Bilateral leg pain     Evaluation Date: 5/31/2023  Authorization Period Expiration: 12-31-23  Plan of Care Expiration: 8-31-23  Visit # / Visits authorized: 1/ 1   Progress Note Due: 6-31-23  FOTO: 1/ 1    Precautions: Standard and hx of Glaucoma, HTN    Time In: 5:30 pm  Time Out: 6:10 pm  Total Appointment Time (timed & untimed codes): 40 minutes    Subjective   Date of onset: several years   History of current condition - Shiloh reports: that her knees has been hurting for awhile. Pt states she has sitting down job, which cause pain. Pt states she cannot standing for too long, dancing, riding bike, walking for long period of time. She feels her entire leg heavy. Pt describe pain as aching pain. She has problem ascending and descending stairs. Pt states pain is affecting her daily life.  She is having difficult to get in and out of the tube. Sometimes knee pain is waking her hurt at night. Pt states she has been loosing knee range of motion. Pt states she feel knee swollen when she stay on her feet for long period of time.       ERIN: No traumatic event. Pain gradually increased   Any popping:  yes  Any Locking: no  Any buckling:No   Pain radiates:entire leg hurts   Pain constant or intermittent: constant   Any injections: No     Pain:  Current 7/10, worst 9/10, best 2/10   Location: bilateral knee   Description: Aching  Aggravating Factors: see  above   Easing Factors: NSAIDs     Prior Therapy: No   Social History: lives with their family  Occupation: siting down job, works  in bank  Prior Level of Function: Independent   Current Level of Function: independent     Pts goals: ride bike and walk with out suffering pain. Decrease knee pain     Imaging, bone scan films:   Right knee:     There is moderate medial knee compartment joint space narrowing.  Large osteophyte at the medial and lateral knee margin.  Severe femoral patellar joint space narrowing and large osteophyte at the femoral patellar joint.  Small joint effusion.  No fracture, no osseous lesions.  The soft tissues appear normal.     Left knee:     Severe medial knee compartment joint space narrowing.  Large osteophyte arising from the medial and lateral knee margin.  Severe femoral patellar joint space narrowing with large osteophyte at the femoral patellar joint.  No fracture, no osseous lesions.  Small joint effusion.  The soft tissues appear normal.     Medical History:   Past Medical History:   Diagnosis Date    Glaucoma     Hyperlipidemia     Hypertension     Prediabetes        Surgical History:   Shiloh Thayer  has a past surgical history that includes Cholecystectomy; Hysterectomy; and  section ().    Medications:   Shiloh has a current medication list which includes the following prescription(s): amlodipine, atorvastatin, hydrochlorothiazide, ibuprofen, latanoprost, and timolol maleate 0.5%.    Allergies:   Review of patient's allergies indicates:  No Known Allergies       Objective       Observation:        Posture Alignment: knee valgus ;    Sensation: intact to light touch    DTR: intact to light touch    GAIT DEVIATIONS: Shiloh displays antalgic gait;    Range of Motion:   Knee Left active   Flexion 105 deg with pain   Extension Lacking - 10 deg      Knee Right active   Flexion 108 deg wih pain   Extension Lacking -10 deg with pain        Lower Extremity Strength    Right LE  Left LE    Knee extension: 4-/5 Knee extension: 4-/5   Knee flexion: 4+/5 Knee flexion: 4+/5   Hip flexion: 4/5 Hip flexion: 4/5   Hip extension:  4/5 Hip extension: 4/5   Hip abduction: 4/5 Hip abduction: 4/5   Hip adduction: 4/5 Hip adduction 4/5   Ankle dorsiflexion: 4+/5 Ankle dorsiflexion: 4+/5   Ankle plantarflexion: 4+/5 Ankle plantarflexion: 4+/5         Step down test: Positive  Squat: Positive  Single leg balance: Positive    Joint Mobility:        Patellar sup./inf: decrease    Patellar med/lat: decrease     Palpation: Global anterior knee pain       CMS Impairment/Limitation/Restriction for FOTO knee Survey    Therapist reviewed FOTO scores for Shiloh Thayer on 5/31/2023.   FOTO documents entered into EPIC - see Media section.    Limitation Score: 53%         TREATMENT   Treatment Time In: 6:00 pm  Treatment Time Out: 6:10 pm  Total Treatment time separate from Evaluation: 10 minutes    Shiloh received therapeutic exercises to develop strength, endurance, ROM, and flexibility for 10 minutes including:  Quad sets  towel under heel  SAQ   SLR       Home Exercises and Patient Education Provided    Education provided:   - Perform HEP 2 times per day     Written Home Exercises Provided: Patient instructed to cont prior HEP.  Exercises were reviewed and Shiloh was able to demonstrate them prior to the end of the session.  Shiloh demonstrated good  understanding of the education provided.     See EMR under Patient Instructions for exercises provided 5/31/2023.    Assessment   Shiloh is a 57 y.o. female referred to outpatient Physical Therapy with a medical diagnosis of Chronic pain of both knees. Pt presents to therapy with B knee pain. Pt has sign and symptoms of B knee OA. X-rays reveals There is moderate medial knee compartment joint space narrowing on R knee and Severe medial knee compartment joint space narrowing.on L knee. Pt ambulates with antalgic gait pattern. Pt has increase in B  knee edema when ambulating for long period of time. Pt has decrease B knee AROM and decrease B LE muscles strength. Decrease patella mobility in all planes. During palpation, pt has global anterior B knee pain. Pt will benefit from skilled PT services to decrease pain to return to PLOF.     Pt prognosis is Fair.   Pt will benefit from skilled outpatient Physical Therapy to address the deficits stated above and in the chart below, provide pt/family education, and to maximize pt's level of independence.     Plan of care discussed with patient: Yes  Pt's spiritual, cultural and educational needs considered and patient is agreeable to the plan of care and goals as stated below:     Anticipated Barriers for therapy: Scheduling issues     Medical Necessity is demonstrated by the following  History  Co-morbidities and personal factors that may impact the plan of care Co-morbidities:   Glaucoma   Hyperlipidemia   Hypertension   Prediabetes       Personal Factors:   no deficits     low   Examination  Body Structures and Functions, activity limitations and participation restrictions that may impact the plan of care Body Regions:   knee    Body Systems:    ROM  strength  balance  gait  transfers  transitions  motor control  motor learning    Participation Restrictions:   Community ambulation    Activity limitations:   Learning and applying knowledge  no deficits    General Tasks and Commands  no deficits    Communication  no deficits    Mobility  walking  moving around using equipment (WC)  using transportation (bus, train, plane, car)  driving (bike, car, motorcycle)    Self care  no deficits    Domestic Life  shopping  cooking  doing house work (cleaning house, washing dishes, laundry)    Interactions/Relationships  no deficits    Life Areas  no deficits    Community and Social Life  community life         Complexity: low   Clinical Presentation stable and uncomplicated low   Decision Making/ Complexity Score: low        GOALS: Short Term Goals:  4 weeks  1.Report decreased in pain at worse less than  <   / =  5  /10  to increase tolerance for functional mobility.On going  2. Pt to improve B knee range of motion by 25% to allow for improved functional mobility to allow for improvement in IADLs. .On going  3. Increased B LE MMT 1/2 grade to increase tolerance for ADL and work activities.On going  4. Pt to report able to ascending and descending stairs with minor knee pain to improve mobility   5. Pt to tolerate HEP to improve ROM and independence with ADL's.On going    Long Term Goals: 8 weeks  1.Report decreased in pain at worse less than  <   / =  2  /10  to increase tolerance for functional mobility. On going  2.Pt to improve range of motion by 75% to allow for improved functional mobility to allow for improvement in IADLs. On going  3.Increased B LE MMT 1 grade to increase tolerance for ADL and work activities.On going  4. Pt will report 36% on FOTO knee survey  to demonstrate increase in LE function with every day tasks. On going  5. Pt to be Independent with HEP to improve ROM and independence with ADL's. On going  6.Pt to report able to ascending and descending stairs with no knee pain to improve mobility     Plan   Plan of care Certification: 5/31/2023 to 8-31-23.    Outpatient Physical Therapy 2 times weekly for 12 weeks to include the following interventions: Gait Training, Manual Therapy, Moist Heat/ Ice, Neuromuscular Re-ed, Patient Education, Therapeutic Activities, and Therapeutic Exercise. Harris Bradshaw, PT      I CERTIFY THE NEED FOR THESE SERVICES FURNISHED UNDER THIS PLAN OF TREATMENT AND WHILE UNDER MY CARE   Physician's comments:     Physician's Signature: ___________________________________________________

## 2023-06-01 ENCOUNTER — TELEPHONE (OUTPATIENT)
Dept: INTERNAL MEDICINE | Facility: CLINIC | Age: 58
End: 2023-06-01
Payer: OTHER GOVERNMENT

## 2023-06-01 DIAGNOSIS — M17.0 PRIMARY OSTEOARTHRITIS OF BOTH KNEES: Primary | ICD-10-CM

## 2023-06-01 DIAGNOSIS — E78.5 HYPERLIPIDEMIA, UNSPECIFIED HYPERLIPIDEMIA TYPE: ICD-10-CM

## 2023-06-01 RX ORDER — DICLOFENAC SODIUM 10 MG/G
2 GEL TOPICAL 4 TIMES DAILY
Qty: 100 G | Refills: 1 | Status: SHIPPED | OUTPATIENT
Start: 2023-06-01 | End: 2023-07-14 | Stop reason: ALTCHOICE

## 2023-06-01 RX ORDER — ATORVASTATIN CALCIUM 20 MG/1
20 TABLET, FILM COATED ORAL DAILY
Qty: 90 TABLET | Refills: 3 | Status: SHIPPED | OUTPATIENT
Start: 2023-06-01 | End: 2024-03-08

## 2023-06-01 NOTE — TELEPHONE ENCOUNTER
Spoke to pt about xray results  Severe OA of knees, referred to Ortho  Pt concerns statin causing muscle pain, would like to reduce to 20mg  Rx 20mg atorvastatin sent to pharmacy

## 2023-06-02 ENCOUNTER — OFFICE VISIT (OUTPATIENT)
Dept: ORTHOPEDICS | Facility: CLINIC | Age: 58
End: 2023-06-02
Payer: OTHER GOVERNMENT

## 2023-06-02 VITALS — BODY MASS INDEX: 37.34 KG/M2 | HEIGHT: 68 IN | WEIGHT: 246.38 LBS

## 2023-06-02 DIAGNOSIS — M17.0 PRIMARY OSTEOARTHRITIS OF BOTH KNEES: ICD-10-CM

## 2023-06-02 PROCEDURE — 99999 PR PBB SHADOW E&M-EST. PATIENT-LVL IV: ICD-10-PCS | Mod: PBBFAC,,, | Performed by: ORTHOPAEDIC SURGERY

## 2023-06-02 PROCEDURE — 99999 PR PBB SHADOW E&M-EST. PATIENT-LVL IV: CPT | Mod: PBBFAC,,, | Performed by: ORTHOPAEDIC SURGERY

## 2023-06-02 PROCEDURE — 99203 OFFICE O/P NEW LOW 30 MIN: CPT | Mod: S$GLB,,, | Performed by: ORTHOPAEDIC SURGERY

## 2023-06-02 PROCEDURE — 99203 PR OFFICE/OUTPT VISIT, NEW, LEVL III, 30-44 MIN: ICD-10-PCS | Mod: S$GLB,,, | Performed by: ORTHOPAEDIC SURGERY

## 2023-06-05 ENCOUNTER — OFFICE VISIT (OUTPATIENT)
Dept: PAIN MEDICINE | Facility: CLINIC | Age: 58
End: 2023-06-05
Payer: OTHER GOVERNMENT

## 2023-06-05 VITALS
DIASTOLIC BLOOD PRESSURE: 81 MMHG | WEIGHT: 247.69 LBS | BODY MASS INDEX: 37.66 KG/M2 | RESPIRATION RATE: 20 BRPM | SYSTOLIC BLOOD PRESSURE: 134 MMHG | OXYGEN SATURATION: 100 % | HEART RATE: 63 BPM

## 2023-06-05 DIAGNOSIS — M25.562 CHRONIC PAIN OF BOTH KNEES: Primary | ICD-10-CM

## 2023-06-05 DIAGNOSIS — G89.29 CHRONIC PAIN OF BOTH KNEES: Primary | ICD-10-CM

## 2023-06-05 DIAGNOSIS — M17.0 PRIMARY OSTEOARTHRITIS OF BOTH KNEES: ICD-10-CM

## 2023-06-05 DIAGNOSIS — M25.561 CHRONIC PAIN OF BOTH KNEES: Primary | ICD-10-CM

## 2023-06-05 PROCEDURE — 99204 PR OFFICE/OUTPT VISIT, NEW, LEVL IV, 45-59 MIN: ICD-10-PCS | Mod: 25,S$GLB,, | Performed by: PAIN MEDICINE

## 2023-06-05 PROCEDURE — 20610 DRAIN/INJ JOINT/BURSA W/O US: CPT | Mod: 50,S$GLB,, | Performed by: PAIN MEDICINE

## 2023-06-05 PROCEDURE — 99999 PR PBB SHADOW E&M-EST. PATIENT-LVL V: ICD-10-PCS | Mod: PBBFAC,,, | Performed by: PAIN MEDICINE

## 2023-06-05 PROCEDURE — 99999 PR PBB SHADOW E&M-EST. PATIENT-LVL V: CPT | Mod: PBBFAC,,, | Performed by: PAIN MEDICINE

## 2023-06-05 PROCEDURE — 20610 PR DRAIN/INJECT LARGE JOINT/BURSA: ICD-10-PCS | Mod: 50,S$GLB,, | Performed by: PAIN MEDICINE

## 2023-06-05 PROCEDURE — 99204 OFFICE O/P NEW MOD 45 MIN: CPT | Mod: 25,S$GLB,, | Performed by: PAIN MEDICINE

## 2023-06-05 RX ORDER — METHYLPREDNISOLONE ACETATE 40 MG/ML
40 INJECTION, SUSPENSION INTRA-ARTICULAR; INTRALESIONAL; INTRAMUSCULAR; SOFT TISSUE
Status: COMPLETED | OUTPATIENT
Start: 2023-06-05 | End: 2023-06-05

## 2023-06-05 RX ADMIN — METHYLPREDNISOLONE ACETATE 40 MG: 40 INJECTION, SUSPENSION INTRA-ARTICULAR; INTRALESIONAL; INTRAMUSCULAR; SOFT TISSUE at 07:06

## 2023-06-05 NOTE — PROGRESS NOTES
Subjective:     Patient ID: Shiloh Thayer is a 57 y.o. female    Chief Complaint: Knee Pain (Bilateral achy pain /More prominent in Left knee)      Referred by: Fernando Arora MD      HPI:    Initial Encounter (6/5/23):  Shiloh Thayer is a 57 y.o. female who presents today with chronic bilateral knee pain secondary to osteoarthritis.  Was referred by Orthopedic surgery.  Was told that she will likely need knee replacement surgeries, but prior to considering this she needs to undergo physical therapy to improve range of motion/stiffness.  Pain is a limiting factor for physical therapy.  She was referred to discuss potential options for managing her pain to facilitate therapy.  Patient states that she has undergone intra-articular steroid injections in the past.  States that these were done years ago but provided 6 months of good relief.  She is interested in repeating steroid injections at this time.  The pain is constant.  Significantly worsened with standing and walking.  Also particularly bad when initiating activity after rest.   This pain is described in detail below.    Physical Therapy:  Yes.  Currently attending.    Non-pharmacologic Treatment:  Rest helps         TENS?  No    Pain Medications:         Currently taking:  Ibuprofen, Voltaren gel    Has tried in the past:  Tylenol    Has not tried:  Opioids,  Muscle relaxants, TCAs, SNRIs, anticonvulsants    Blood thinners:  None    Interventional Therapies:  None    Relevant Surgeries:  None    Affecting sleep?  Yes    Affecting daily activities? yes    Depressive symptoms? No          SI/HI? No    Work status: Employed    Pain Scores:    Best:       4/10  Worst:     10/10  Usually:   5/10  Today:    4/10    Pain Disability Index  Family/Home Responsibilities:: 9  Recreation:: 9  Social Activity:: 10  Occupation:: 10  Sexual Behavior:: 0  Self Care:: 0  Life-Support Activities:: 0  Pain Disability Index (PDI): 38    Review of Systems   Constitutional:   Negative for activity change, appetite change, chills, fatigue, fever and unexpected weight change.   HENT:  Negative for hearing loss.    Eyes:  Negative for visual disturbance.   Respiratory:  Negative for chest tightness and shortness of breath.    Cardiovascular:  Negative for chest pain.   Gastrointestinal:  Negative for abdominal pain, constipation, diarrhea, nausea and vomiting.   Genitourinary:  Negative for difficulty urinating.   Musculoskeletal:  Positive for arthralgias, gait problem and myalgias. Negative for back pain and neck pain.   Skin:  Negative for rash.   Neurological:  Negative for dizziness, weakness, light-headedness, numbness and headaches.   Psychiatric/Behavioral:  Positive for sleep disturbance. Negative for hallucinations and suicidal ideas. The patient is not nervous/anxious.      Past Medical History:   Diagnosis Date    Glaucoma     Hyperlipidemia     Hypertension     Prediabetes        Past Surgical History:   Procedure Laterality Date     SECTION  1989    CHOLECYSTECTOMY      HYSTERECTOMY         Social History     Socioeconomic History    Marital status:    Tobacco Use    Smoking status: Never    Smokeless tobacco: Never   Substance and Sexual Activity    Alcohol use: Yes     Alcohol/week: 2.0 standard drinks     Types: 2 Glasses of wine per week    Drug use: Not Currently    Sexual activity: Yes     Partners: Male     Birth control/protection: See Surgical Hx       Review of patient's allergies indicates:  No Known Allergies    Current Outpatient Medications on File Prior to Visit   Medication Sig Dispense Refill    amLODIPine (NORVASC) 5 MG tablet Take 5 mg by mouth once daily.  3    atorvastatin (LIPITOR) 20 MG tablet Take 1 tablet (20 mg total) by mouth once daily. 90 tablet 3    diclofenac sodium (VOLTAREN) 1 % Gel Apply 2 g topically 4 (four) times daily. Knees/heel 100 g 1    hydroCHLOROthiazide (HYDRODIURIL) 12.5 MG Tab Take 12.5 mg by mouth once daily.  4     ibuprofen (ADVIL,MOTRIN) 600 MG tablet Take 1 tablet (600 mg total) by mouth every 8 (eight) hours as needed for Pain. 30 tablet 0    latanoprost 0.005 % ophthalmic solution       timolol maleate 0.5% (TIMOPTIC) 0.5 % Drop Place 1 drop into both eyes 2 (two) times daily.       No current facility-administered medications on file prior to visit.       Objective:      /81 (BP Location: Right arm, Patient Position: Sitting, BP Method: Medium (Automatic))   Pulse 63   Resp 20   Wt 112.4 kg (247 lb 11.2 oz)   LMP  (LMP Unknown) Comment: hysterectomy   SpO2 100%   BMI 37.66 kg/m²     Exam:  GEN:  Well developed, well nourished.  No acute distress.   HEENT:  No trauma.  Mucous membranes moist.  Nares patent bilaterally.  PSYCH: Normal affect. Thought content appropriate.  CHEST:  Breathing symmetric.  No audible wheezing.  ABD: Soft, non-distended.  SKIN:  Warm, pink, dry.  No rash on exposed areas.    EXT:  No cyanosis, clubbing, or edema.  No color change or changes in nail or hair growth.  NEURO/MUSCULOSKELETAL:  Fully alert, oriented, and appropriate. Speech normal cami. No cranial nerve deficits.   Gait:  Antalgic.  No focal motor deficits.     No gross warmth, swelling, erythema to bilateral knees   No joint laxity instability noted   Full range of motion with bilateral knee extension  Bilateral knee flexion slightly limited  Some tenderness to palpation of the medial joint lines bilaterally  Moderate crepitus noted bilaterally      Imaging:    Narrative & Impression    EXAMINATION:  XR KNEE 3 VIEW BILATERAL     CLINICAL HISTORY:  Pain in right knee     TECHNIQUE:  AP, lateral, and Merchant views of both knees were performed.     COMPARISON:  None     FINDINGS:  Right knee:     There is moderate medial knee compartment joint space narrowing.  Large osteophyte at the medial and lateral knee margin.  Severe femoral patellar joint space narrowing and large osteophyte at the femoral patellar joint.  Small  joint effusion.  No fracture, no osseous lesions.  The soft tissues appear normal.     Left knee:     Severe medial knee compartment joint space narrowing.  Large osteophyte arising from the medial and lateral knee margin.  Severe femoral patellar joint space narrowing with large osteophyte at the femoral patellar joint.  No fracture, no osseous lesions.  Small joint effusion.  The soft tissues appear normal.     Impression:     Moderate to severe degenerative changes detailed above.        Electronically signed by: Gricel Gilbert MD  Date:                                            05/29/2023  Time:                                           15:53       Assessment:       Encounter Diagnoses   Name Primary?    Primary osteoarthritis of both knees     Chronic pain of both knees Yes         Plan:       Shiloh was seen today for knee pain.    Diagnoses and all orders for this visit:    Chronic pain of both knees  -     methylPREDNISolone acetate injection 40 mg  -     methylPREDNISolone acetate injection 40 mg    Primary osteoarthritis of both knees  -     Ambulatory referral/consult to Pain Clinic  -     methylPREDNISolone acetate injection 40 mg  -     methylPREDNISolone acetate injection 40 mg        Shiloh Thayer is a 57 y.o. female with chronic bilateral knee pain secondary to osteoarthritis.    Pertinent imaging studies reviewed by me. Imaging results were discussed with patient.  Bilateral knee steroid injections done today.  Proceed with physical therapy as planned.  Return to clinic in 6 weeks or sooner if needed.  At that time we will discuss efficacy of knee injections and physical therapy.  May consider genicular nerve blocks/RFA if appropriate.        Bilateral knee steroid injection:     Risks vs. benefits were discussed with patient and patient expressed understanding. Written consent was obtained. The bilateral knee(s) was/were properly marked and cleaned with chlorprep. Using a 27 gauge 1.5  inch needle, 40mg Depo-Medrol and 2.0cc of 0.25% bupivacaine were injected in the lateral approach after negative apiration. There was no bleeding or other complications. Patient reported immediate pain relief and improved ROM following injection(s).                 This note was created by combination of typed  and M-Modal dictation. Transcription and phonetic errors may be present.  If there are any questions, please contact me.

## 2023-06-11 NOTE — PROGRESS NOTES
Subjective:       Patient ID: Shiloh Thayer is a 57 y.o. female.    Chief Complaint:   Pain of the Left Knee and Pain of the Right Knee  She comes in for bilateral knee pain, gradually getting worse for a few years.  Pain is typically about a 5/10, occasionally worse with more weight-bearing activities.  A few years ago she had a corticosteroid injection which she recalls being helpful.  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  She has morning stiffness, but no night pain interfering with sleep.  No fall, accident, injury, history of pertinent surgery.  No groin pain, distal numbness or tingling.  She takes ibuprofen as needed.    Past Medical History:   Diagnosis Date    Glaucoma     Hyperlipidemia     Hypertension     Prediabetes      Past Surgical History:   Procedure Laterality Date     SECTION      CHOLECYSTECTOMY      HYSTERECTOMY       Family History   Problem Relation Age of Onset    Diabetes Mother     Hypertension Mother     Glaucoma Mother     Heart disease Father          from MI age 50s     Social History     Socioeconomic History    Marital status:    Tobacco Use    Smoking status: Never    Smokeless tobacco: Never   Substance and Sexual Activity    Alcohol use: Yes     Alcohol/week: 2.0 standard drinks     Types: 2 Glasses of wine per week    Drug use: Not Currently    Sexual activity: Yes     Partners: Male     Birth control/protection: See Surgical Hx       Current Outpatient Medications   Medication Sig Dispense Refill    amLODIPine (NORVASC) 5 MG tablet Take 5 mg by mouth once daily.  3    atorvastatin (LIPITOR) 20 MG tablet Take 1 tablet (20 mg total) by mouth once daily. 90 tablet 3    diclofenac sodium (VOLTAREN) 1 % Gel Apply 2 g topically 4 (four) times daily. Knees/heel 100 g 1    hydroCHLOROthiazide (HYDRODIURIL) 12.5 MG Tab Take 12.5 mg by mouth once daily.  4    ibuprofen (ADVIL,MOTRIN) 600 MG tablet Take 1 tablet (600 mg total) by mouth  "every 8 (eight) hours as needed for Pain. 30 tablet 0    latanoprost 0.005 % ophthalmic solution       timolol maleate 0.5% (TIMOPTIC) 0.5 % Drop Place 1 drop into both eyes 2 (two) times daily.       No current facility-administered medications for this visit.     Review of patient's allergies indicates:  No Known Allergies      Objective:      Vitals:    06/02/23 1124   Weight: 111.7 kg (246 lb 5.8 oz)   Height: 5' 8" (1.727 m)     Physical Exam  There is significant varus deformity, which is minimally passively correctable.  Active range of motion is 5-95 degrees on the left, and 0-110 on the right.  Anterior crepitus with active extension.  Patellar mobility is limited.  Boggy synovitis without effusion.  Medial joint line tenderness predominates.  No instability to varus/valgus/Lachman's stressing.  No pain in the groin with flexion and internal rotation of the hip which is not limited.  Skin intact.  Distal neurovascular intact.  Flip test negative.    Imaging Review:   Recent x-rays done elsewhere without weight-bearing show Kellgren-Benjamín grade 4 end-stage varus gonarthrosis with medial bone loss and large marginal osteophytes  Assessment:   End-stage varus gonarthrosis, bilateral knees  Plan:       I explained to her that she probably should start thinking about having knee replacement because of the stiffness, especially on the left side.  I explained to the patient that stiff knees make stiff knee replacements, and that they should not expect to achieve more flexion postoperatively than they have preoperatively.  She is not quite ready for surgery as of today, so we will have her follow-up with her pain management specialist to see what he can offer.  I will see her again in 6 weeks to revisit her situation.    The patient's pathophysiology was explained in detail with reference to x-rays, models, other visual aids as appropriate.  Treatment options were discussed in detail.  Questions were invited " and answered to the patient's satisfaction.    Fernando Arora MD  Orthopaedic Surgery

## 2023-06-13 ENCOUNTER — CLINICAL SUPPORT (OUTPATIENT)
Dept: REHABILITATION | Facility: HOSPITAL | Age: 58
End: 2023-06-13
Payer: OTHER GOVERNMENT

## 2023-06-13 DIAGNOSIS — M25.661 DECREASED RANGE OF MOTION OF BOTH KNEES: ICD-10-CM

## 2023-06-13 DIAGNOSIS — M25.662 DECREASED RANGE OF MOTION OF BOTH KNEES: ICD-10-CM

## 2023-06-13 DIAGNOSIS — R26.9 GAIT DIFFICULTY: ICD-10-CM

## 2023-06-13 DIAGNOSIS — M25.561 CHRONIC PAIN OF BOTH KNEES: Primary | ICD-10-CM

## 2023-06-13 DIAGNOSIS — G89.29 CHRONIC PAIN OF BOTH KNEES: Primary | ICD-10-CM

## 2023-06-13 DIAGNOSIS — M25.562 CHRONIC PAIN OF BOTH KNEES: Primary | ICD-10-CM

## 2023-06-13 DIAGNOSIS — R29.898 WEAKNESS OF BOTH LOWER EXTREMITIES: ICD-10-CM

## 2023-06-13 PROCEDURE — 97112 NEUROMUSCULAR REEDUCATION: CPT | Mod: PN

## 2023-06-13 PROCEDURE — 97530 THERAPEUTIC ACTIVITIES: CPT | Mod: PN

## 2023-06-13 PROCEDURE — 97110 THERAPEUTIC EXERCISES: CPT | Mod: PN

## 2023-06-13 NOTE — PROGRESS NOTES
[facility]   Physical Therapy Treatment Note     Name: Shiloh Thayer  Clinic Number: [id number]    Therapy Diagnosis:   Encounter Diagnoses   Name Primary?    Chronic pain of both knees Yes    Weakness of both lower extremities     Decreased range of motion of both knees     Gait difficulty      Physician: Chelsea Mcduffie PA-C    Visit Date: 6/13/2023    Physician Orders: PT Eval and Treat   Medical Diagnosis from Referral:   M25.561,M25.562,G89.29 (ICD-10-CM) - Chronic pain of both knees   M79.604,M79.605 (ICD-10-CM) - Bilateral leg pain      Evaluation Date: 5/31/2023  Authorization Period Expiration: 12-31-23  Plan of Care Expiration: 8-31-23  Visit # / Visits authorized: 1/ 20   Progress Note Due: 6-31-23  FOTO: 1/ 1     Precautions: Standard and hx of Glaucoma, HTN      Time In: 8:12 am  Time Out: 9:05 am  Total Billable Time: 53 minutes      SUBJECTIVE     Pt reports: that she had injection done B knee knee. Pt states injection helped, but she still have minor B knee pain. Pt increases when she sits and stand up for long period of time. She has problem to perform sit to stand.   She was compliant with home exercise program.  Response to previous treatment: No change   Functional change: None    Pain: 2/10  Location: bilateral knee     OBJECTIVE     Objective Measures updated at progress report unless specified.       TREATMENT     Shiloh received the treatments listed below:        therapeutic activities to improve functional performance for 8  minutes, including:  Nustjohn     received therapeutic exercises to develop strength and ROM for 35   minutes including:    HL hip add ball squeee 3x10  HL hip abd YTB  3x10  Clamshell YTB 3x10  Bridge YTB 3x10   SL hip abd 3x10  SL hip add 3x10   Prone hip extension 3x10   LAQ 3x10   Heel slides 3x10  Heel props 5 min     neuromuscular re-education activities to improve: Coordination, Kinesthetic, Proprioception, and motor control for  10  minutes. The following activities were included:    Quad sets  towel under heel 3x10 hold sec  SAQ 3x10 hold 3 sec  SLR 3x10  Prone TKE 3x10 hold 2 sec    received the following manual therapy techniques: Soft tissue Mobilization were applied to the: N/a for 00 minutes, including:      PATIENT EDUCATION AND HOME EXERCISES     Home Exercises and Patient Education Provided     Education provided:   - Perform HEP 2 times per day      Written Home Exercises Provided: Patient instructed to cont prior HEP.  Exercises were reviewed and Shiloh was able to demonstrate them prior to the end of the session.  Shiloh demonstrated good  understanding of the education provided.      See EMR under Patient Instructions for exercises provided 5/31/2023.    ASSESSMENT     Pt responded good to therapy. No increase of B knee pain. Pt had difficult with quad, hip abd, and hip add exercises due to weakness. All exercises performed  non-WB to avoid provocation of pain. Pt required mod v/c's to perform exercises with proper form and proper muscles activation. Cont POC    Shiloh Is progressing well towards her goals.   Pt prognosis is Fair.     Pt will continue to benefit from skilled outpatient physical therapy to address the deficits listed in the problem list box on initial evaluation, provide pt/family education and to maximize pt's level of independence in the home and community environment.     Pt's spiritual, cultural and educational needs considered and pt agreeable to plan of care and goals.     Anticipated barriers to physical therapy: Scheduling issues        GOALS: Short Term Goals:  4 weeks  1.Report decreased in pain at worse less than  <   / =  5  /10  to increase tolerance for functional mobility.On going  2. Pt to improve B knee range of motion by 25% to allow for improved functional mobility to allow for improvement in IADLs. .On going  3. Increased B LE MMT 1/2 grade to increase tolerance for ADL and work  activities.On going  4. Pt to report able to ascending and descending stairs with minor knee pain to improve mobility   5. Pt to tolerate HEP to improve ROM and independence with ADL's.On going     Long Term Goals: 8 weeks  1.Report decreased in pain at worse less than  <   / =  2  /10  to increase tolerance for functional mobility. On going  2.Pt to improve range of motion by 75% to allow for improved functional mobility to allow for improvement in IADLs. On going  3.Increased B LE MMT 1 grade to increase tolerance for ADL and work activities.On going  4. Pt will report 36% on FOTO knee survey  to demonstrate increase in LE function with every day tasks. On going  5. Pt to be Independent with HEP to improve ROM and independence with ADL's. On going  6.Pt to report able to ascending and descending stairs with no knee pain to improve mobility        PLAN     Cont POC    Nas Bradshaw, PT

## 2023-06-14 NOTE — PROGRESS NOTES
[facility]   Physical Therapy Treatment Note     Name: Shiloh Thayer  Clinic Number: [id number]    Therapy Diagnosis:   Encounter Diagnoses   Name Primary?    Chronic pain of both knees Yes    Weakness of both lower extremities     Decreased range of motion of both knees     Gait difficulty        Physician: Chelsea Mcduffie PA-C    Visit Date: 6/15/2023    Physician Orders: PT Eval and Treat   Medical Diagnosis from Referral:   M25.561,M25.562,G89.29 (ICD-10-CM) - Chronic pain of both knees   M79.604,M79.605 (ICD-10-CM) - Bilateral leg pain      Evaluation Date: 5/31/2023  Authorization Period Expiration: 12-31-23  Plan of Care Expiration: 8-31-23  Visit # / Visits authorized: 1/ 20   Progress Note Due: 6-31-23  FOTO: 1/ 1     Precautions: Standard and hx of Glaucoma, HTN      Time In: 139PM  Time Out: 147PM  Total Billable Time: 68 minutes      SUBJECTIVE     Pt reports: She is doing okay today. She was a little sore after last visit. She isn't used to exercising and has been less active due to knee pain. She has stiffness after prolonged sitting.   She was compliant with home exercise program.  Response to previous treatment: Inc soreness  Functional change: None    Pain: 2/10  Location: bilateral knee     OBJECTIVE     Objective Measures updated at progress report unless specified.       TREATMENT     Shiloh received the treatments listed below:        therapeutic activities to improve functional performance for 8  minutes, including:  Recumbent bike     received therapeutic exercises to develop strength and ROM for 39 minutes including:    Tailgaters 7# ea x 2'  HL hip add ball squeeze 3x10  HL hip abd YTB  3x10  Clamshell YTB 3x10  Bridge YTB 3x10   SL hip abd 3x10 - np  SL hip add 3x10 - np   Prone hip extension 3x10   LAQ 3x10   Heel slides 3x10  Heel props 5 min     neuromuscular re-education activities to improve: Coordination, Kinesthetic, Proprioception, and motor  control for 15  minutes. The following activities were included:    SAQ 3x10 hold 3 sec  SLR 3x10  Prone TKE 3x10 hold 2 sec    received the following manual therapy techniques: Soft tissue Mobilization were applied to the: bilat knees for 06 minutes, including:    Seated tibial distraction bilat     PATIENT EDUCATION AND HOME EXERCISES     Home Exercises and Patient Education Provided     Education provided:   - Perform HEP 2 times per day      Written Home Exercises Provided: Patient instructed to cont prior HEP.  Exercises were reviewed and Shiloh was able to demonstrate them prior to the end of the session.  Shiloh demonstrated good  understanding of the education provided.      See EMR under Patient Instructions for exercises provided 5/31/2023.    ASSESSMENT     Pt tolerated session well. No increase of B knee pain. Pt had difficult with quad, hip abd, and hip add exercises due to weakness. All exercises performed  non-WB to avoid provocation of pain. Introduced recumbent bike to improve ROM with pt reporting she has bike at home and has discontinued due to knee pain. Pt tolerated bike well today with pt educated on seat height and relation to knee ROM. Progressions limited today due to pts reports of increased soreness following previous visit. Will progress as tolerated.     Shiloh Is progressing well towards her goals.   Pt prognosis is Fair.     Pt will continue to benefit from skilled outpatient physical therapy to address the deficits listed in the problem list box on initial evaluation, provide pt/family education and to maximize pt's level of independence in the home and community environment.     Pt's spiritual, cultural and educational needs considered and pt agreeable to plan of care and goals.     Anticipated barriers to physical therapy: Scheduling issues        GOALS: Short Term Goals:  4 weeks  1.Report decreased in pain at worse less than  <   / =  5  /10  to increase tolerance for  functional mobility.On going  2. Pt to improve B knee range of motion by 25% to allow for improved functional mobility to allow for improvement in IADLs. .On going  3. Increased B LE MMT 1/2 grade to increase tolerance for ADL and work activities.On going  4. Pt to report able to ascending and descending stairs with minor knee pain to improve mobility   5. Pt to tolerate HEP to improve ROM and independence with ADL's.On going     Long Term Goals: 8 weeks  1.Report decreased in pain at worse less than  <   / =  2  /10  to increase tolerance for functional mobility. On going  2.Pt to improve range of motion by 75% to allow for improved functional mobility to allow for improvement in IADLs. On going  3.Increased B LE MMT 1 grade to increase tolerance for ADL and work activities.On going  4. Pt will report 36% on FOTO knee survey  to demonstrate increase in LE function with every day tasks. On going  5. Pt to be Independent with HEP to improve ROM and independence with ADL's. On going  6.Pt to report able to ascending and descending stairs with no knee pain to improve mobility        PLAN     Cont FANTA Pearce, PTA   06/15/2023

## 2023-06-15 ENCOUNTER — CLINICAL SUPPORT (OUTPATIENT)
Dept: REHABILITATION | Facility: HOSPITAL | Age: 58
End: 2023-06-15
Payer: OTHER GOVERNMENT

## 2023-06-15 DIAGNOSIS — M25.662 DECREASED RANGE OF MOTION OF BOTH KNEES: ICD-10-CM

## 2023-06-15 DIAGNOSIS — M25.561 CHRONIC PAIN OF BOTH KNEES: Primary | ICD-10-CM

## 2023-06-15 DIAGNOSIS — R29.898 WEAKNESS OF BOTH LOWER EXTREMITIES: ICD-10-CM

## 2023-06-15 DIAGNOSIS — R26.9 GAIT DIFFICULTY: ICD-10-CM

## 2023-06-15 DIAGNOSIS — M25.661 DECREASED RANGE OF MOTION OF BOTH KNEES: ICD-10-CM

## 2023-06-15 DIAGNOSIS — M25.562 CHRONIC PAIN OF BOTH KNEES: Primary | ICD-10-CM

## 2023-06-15 DIAGNOSIS — G89.29 CHRONIC PAIN OF BOTH KNEES: Primary | ICD-10-CM

## 2023-06-15 PROCEDURE — 97110 THERAPEUTIC EXERCISES: CPT | Mod: PN,CQ

## 2023-06-15 PROCEDURE — 97112 NEUROMUSCULAR REEDUCATION: CPT | Mod: PN,CQ

## 2023-06-21 NOTE — PROGRESS NOTES
[facility]   Physical Therapy Treatment Note     Name: Shiloh Thayer  Clinic Number: [id number]    Therapy Diagnosis:   Encounter Diagnoses   Name Primary?    Chronic pain of both knees Yes    Weakness of both lower extremities     Decreased range of motion of both knees     Gait difficulty          Physician: Chelsea Mcduffie PA-C    Visit Date: 6/22/2023    Physician Orders: PT Eval and Treat   Medical Diagnosis from Referral:   M25.561,M25.562,G89.29 (ICD-10-CM) - Chronic pain of both knees   M79.604,M79.605 (ICD-10-CM) - Bilateral leg pain      Evaluation Date: 5/31/2023  Authorization Period Expiration: 12-31-23  Plan of Care Expiration: 8-31-23  Visit # / Visits authorized: 3/ 20   Progress Note Due: 6-31-23  FOTO: 1/ 1     Precautions: Standard and hx of Glaucoma, HTN      Time In: 10:55 am  Time Out: 11:48 am  Total Billable Time: 53 minutes      SUBJECTIVE     Pt reports: she cont with B knee pain. Pain sometimes increase after PT session.   She was compliant with home exercise program.  Response to previous treatment: Inc soreness  Functional change: None    Pain: 2/10  Location: bilateral knee     OBJECTIVE     Objective Measures updated at progress report unless specified.       TREATMENT     Shiloh received the treatments listed below:        therapeutic activities to improve functional performance for 8  minutes, including:  Recumbent bike     received therapeutic exercises to develop strength and ROM for 35 minutes including:    Tailgaters 7# ea x 2'  HL hip add ball squeeze 3x10  HL hip abd YTB  3x10  Clamshell YTB 3x10  Bridge YTB 3x10   SL hip abd 3x10 - np  SL hip add 3x10 - np   Prone hip extension 3x10   LAQ 3x10   Heel slides 3x10  Heel props 5 min     neuromuscular re-education activities to improve: Coordination, Kinesthetic, Proprioception, and motor control for 10  minutes. The following activities were included:    SAQ 3x10 hold 3 sec  SLR 3x10  Prone TKE  3x10 hold 2 sec    received the following manual therapy techniques: Soft tissue Mobilization were applied to the: bilat knees for 00 minutes, including:    Seated tibial distraction bilat     PATIENT EDUCATION AND HOME EXERCISES     Home Exercises and Patient Education Provided     Education provided:   - Perform HEP 2 times per day      Written Home Exercises Provided: Patient instructed to cont prior HEP.  Exercises were reviewed and Shiloh was able to demonstrate them prior to the end of the session.  Shiloh demonstrated good  understanding of the education provided.      See EMR under Patient Instructions for exercises provided 5/31/2023.    ASSESSMENT     Pt tolerated session well. No increase of B knee pain. She cont with B knee pain. She cont with weakness of quad and hip muscles.  Pt had difficult with quad, hip abd, and hip add exercises due to weakness. All exercises performed  non-WB to avoid provocation of pain.  Pt tolerated bike well today with pt educated on seat height and relation to knee ROM. Progressions limited today due to pts reports of increased soreness following previous visit. Plan to perform dry needling next visit. Will progress as tolerated.     Shiloh Is progressing well towards her goals.   Pt prognosis is Fair.     Pt will continue to benefit from skilled outpatient physical therapy to address the deficits listed in the problem list box on initial evaluation, provide pt/family education and to maximize pt's level of independence in the home and community environment.     Pt's spiritual, cultural and educational needs considered and pt agreeable to plan of care and goals.     Anticipated barriers to physical therapy: Scheduling issues        GOALS: Short Term Goals:  4 weeks  1.Report decreased in pain at worse less than  <   / =  5  /10  to increase tolerance for functional mobility.On going  2. Pt to improve B knee range of motion by 25% to allow for improved functional  mobility to allow for improvement in IADLs. .On going  3. Increased B LE MMT 1/2 grade to increase tolerance for ADL and work activities.On going  4. Pt to report able to ascending and descending stairs with minor knee pain to improve mobility   5. Pt to tolerate HEP to improve ROM and independence with ADL's.On going     Long Term Goals: 8 weeks  1.Report decreased in pain at worse less than  <   / =  2  /10  to increase tolerance for functional mobility. On going  2.Pt to improve range of motion by 75% to allow for improved functional mobility to allow for improvement in IADLs. On going  3.Increased B LE MMT 1 grade to increase tolerance for ADL and work activities.On going  4. Pt will report 36% on FOTO knee survey  to demonstrate increase in LE function with every day tasks. On going  5. Pt to be Independent with HEP to improve ROM and independence with ADL's. On going  6.Pt to report able to ascending and descending stairs with no knee pain to improve mobility        PLAN     Cont FANTA Bradshaw, PT   06/22/2023

## 2023-06-22 ENCOUNTER — CLINICAL SUPPORT (OUTPATIENT)
Dept: REHABILITATION | Facility: HOSPITAL | Age: 58
End: 2023-06-22
Payer: OTHER GOVERNMENT

## 2023-06-22 DIAGNOSIS — M25.562 CHRONIC PAIN OF BOTH KNEES: Primary | ICD-10-CM

## 2023-06-22 DIAGNOSIS — R29.898 WEAKNESS OF BOTH LOWER EXTREMITIES: ICD-10-CM

## 2023-06-22 DIAGNOSIS — M25.661 DECREASED RANGE OF MOTION OF BOTH KNEES: ICD-10-CM

## 2023-06-22 DIAGNOSIS — R26.9 GAIT DIFFICULTY: ICD-10-CM

## 2023-06-22 DIAGNOSIS — G89.29 CHRONIC PAIN OF BOTH KNEES: Primary | ICD-10-CM

## 2023-06-22 DIAGNOSIS — M25.662 DECREASED RANGE OF MOTION OF BOTH KNEES: ICD-10-CM

## 2023-06-22 DIAGNOSIS — M25.561 CHRONIC PAIN OF BOTH KNEES: Primary | ICD-10-CM

## 2023-06-22 PROCEDURE — 97112 NEUROMUSCULAR REEDUCATION: CPT | Mod: PN

## 2023-06-22 PROCEDURE — 97530 THERAPEUTIC ACTIVITIES: CPT | Mod: PN

## 2023-06-22 PROCEDURE — 97110 THERAPEUTIC EXERCISES: CPT | Mod: PN

## 2023-06-22 NOTE — PROGRESS NOTES
[facility]   Physical Therapy Treatment Note     Name: Shiloh Thayer  Clinic Number: [id number]    Therapy Diagnosis:   No diagnosis found.      Physician: Chelsea Mcduffie PA-C    Visit Date: 6/22/2023    Physician Orders: PT Eval and Treat   Medical Diagnosis from Referral:   M25.561,M25.562,G89.29 (ICD-10-CM) - Chronic pain of both knees   M79.604,M79.605 (ICD-10-CM) - Bilateral leg pain      Evaluation Date: 5/31/2023  Authorization Period Expiration: 12-31-23  Plan of Care Expiration: 8-31-23  Visit # / Visits authorized: 2/ 20   Progress Note Due: 6-31-23  FOTO: 1/ 1     Precautions: Standard and hx of Glaucoma, HTN      Time In: ***  Time Out: ***  Total Billable Time: *** minutes      SUBJECTIVE     Pt reports: She is doing okay today. She was a little sore after last visit. She isn't used to exercising and has been less active due to knee pain. She has stiffness after prolonged sitting. ***    She was compliant with home exercise program.  Response to previous treatment: Inc soreness  Functional change: None    Pain: 2/10  Location: bilateral knee     OBJECTIVE     Objective Measures updated at progress report unless specified.       TREATMENT     Shiloh received the treatments listed below:        therapeutic activities to improve functional performance for 8  minutes, including:  Recumbent bike     received therapeutic exercises to develop strength and ROM for *** minutes including:    Tailgaters 7# ea x 2'  HL hip add ball squeeze 3x10  HL hip abd YTB  3x10  Clamshell YTB 3x10  Bridge YTB 3x10   SL hip abd 3x10 - np  SL hip add 3x10 - np   Prone hip extension 3x10   LAQ 3x10   Heel slides 3x10  Heel props 5 min     neuromuscular re-education activities to improve: Coordination, Kinesthetic, Proprioception, and motor control for 15  minutes. The following activities were included:    SAQ 3x10 hold 3 sec  SLR 3x10  Prone TKE 3x10 hold 2 sec    received the following  manual therapy techniques: Soft tissue Mobilization were applied to the: bilat knees for 06 minutes, including:    Seated tibial distraction bilat     PATIENT EDUCATION AND HOME EXERCISES     Home Exercises and Patient Education Provided     Education provided:   - Perform HEP 2 times per day      Written Home Exercises Provided: Patient instructed to cont prior HEP.  Exercises were reviewed and Shiloh was able to demonstrate them prior to the end of the session.  Shiloh demonstrated good  understanding of the education provided.      See EMR under Patient Instructions for exercises provided 5/31/2023.    ASSESSMENT     Pt tolerated session well. No increase of B knee pain. Pt had difficult with quad, hip abd, and hip add exercises due to weakness. All exercises performed  non-WB to avoid provocation of pain. Introduced recumbent bike to improve ROM with pt reporting she has bike at home and has discontinued due to knee pain. Pt tolerated bike well today with pt educated on seat height and relation to knee ROM. Progressions limited today due to pts reports of increased soreness following previous visit. Will progress as tolerated. ***    Shiloh Is progressing well towards her goals.   Pt prognosis is Fair.     Pt will continue to benefit from skilled outpatient physical therapy to address the deficits listed in the problem list box on initial evaluation, provide pt/family education and to maximize pt's level of independence in the home and community environment.     Pt's spiritual, cultural and educational needs considered and pt agreeable to plan of care and goals.     Anticipated barriers to physical therapy: Scheduling issues        GOALS: Short Term Goals:  4 weeks  1.Report decreased in pain at worse less than  <   / =  5  /10  to increase tolerance for functional mobility.On going  2. Pt to improve B knee range of motion by 25% to allow for improved functional mobility to allow for improvement in IADLs.  .On going  3. Increased B LE MMT 1/2 grade to increase tolerance for ADL and work activities.On going  4. Pt to report able to ascending and descending stairs with minor knee pain to improve mobility   5. Pt to tolerate HEP to improve ROM and independence with ADL's.On going     Long Term Goals: 8 weeks  1.Report decreased in pain at worse less than  <   / =  2  /10  to increase tolerance for functional mobility. On going  2.Pt to improve range of motion by 75% to allow for improved functional mobility to allow for improvement in IADLs. On going  3.Increased B LE MMT 1 grade to increase tolerance for ADL and work activities.On going  4. Pt will report 36% on FOTO knee survey  to demonstrate increase in LE function with every day tasks. On going  5. Pt to be Independent with HEP to improve ROM and independence with ADL's. On going  6.Pt to report able to ascending and descending stairs with no knee pain to improve mobility        PLAN     Cont POC    Deena Carlton, PTA   06/22/2023

## 2023-06-27 ENCOUNTER — PATIENT MESSAGE (OUTPATIENT)
Dept: PAIN MEDICINE | Facility: CLINIC | Age: 58
End: 2023-06-27
Payer: OTHER GOVERNMENT

## 2023-06-30 ENCOUNTER — CLINICAL SUPPORT (OUTPATIENT)
Dept: REHABILITATION | Facility: HOSPITAL | Age: 58
End: 2023-06-30
Payer: OTHER GOVERNMENT

## 2023-06-30 DIAGNOSIS — M25.662 DECREASED RANGE OF MOTION OF BOTH KNEES: ICD-10-CM

## 2023-06-30 DIAGNOSIS — R29.898 WEAKNESS OF BOTH LOWER EXTREMITIES: ICD-10-CM

## 2023-06-30 DIAGNOSIS — M25.562 CHRONIC PAIN OF BOTH KNEES: Primary | ICD-10-CM

## 2023-06-30 DIAGNOSIS — G89.29 CHRONIC PAIN OF BOTH KNEES: Primary | ICD-10-CM

## 2023-06-30 DIAGNOSIS — M25.561 CHRONIC PAIN OF BOTH KNEES: Primary | ICD-10-CM

## 2023-06-30 DIAGNOSIS — R26.9 GAIT DIFFICULTY: ICD-10-CM

## 2023-06-30 DIAGNOSIS — M25.661 DECREASED RANGE OF MOTION OF BOTH KNEES: ICD-10-CM

## 2023-06-30 PROCEDURE — 97110 THERAPEUTIC EXERCISES: CPT | Mod: PN

## 2023-06-30 PROCEDURE — 97112 NEUROMUSCULAR REEDUCATION: CPT | Mod: PN

## 2023-06-30 PROCEDURE — 97530 THERAPEUTIC ACTIVITIES: CPT | Mod: PN

## 2023-06-30 NOTE — PROGRESS NOTES
[facility]   Physical Therapy Treatment Note     Name: Shiloh Thayer  Clinic Number: [id number]    Therapy Diagnosis:   Encounter Diagnoses   Name Primary?    Chronic pain of both knees Yes    Weakness of both lower extremities     Decreased range of motion of both knees     Gait difficulty            Physician: Chelsea Mcduffie PA-C    Visit Date: 6/30/2023    Physician Orders: PT Eval and Treat   Medical Diagnosis from Referral:   M25.561,M25.562,G89.29 (ICD-10-CM) - Chronic pain of both knees   M79.604,M79.605 (ICD-10-CM) - Bilateral leg pain      Evaluation Date: 5/31/2023  Authorization Period Expiration: 12-31-23  Plan of Care Expiration: 8-31-23  Visit # / Visits authorized: 5/ 20   Progress Note Due: 6-31-23  FOTO: 1/ 1     Precautions: Standard and hx of Glaucoma, HTN      Time In: 9:22 am  Time Out: 10:15 am   Total Billable Time: 55 minutes      SUBJECTIVE     Pt reports:  that she has the same pain as initial eval. Pt states pain increases after PT session.   She was compliant with home exercise program.  Response to previous treatment: Inc soreness  Functional change: None    Pain: 3/10  Location: bilateral knee     OBJECTIVE     Objective Measures updated at progress report unless specified.       TREATMENT     Shiloh received the treatments listed below:        therapeutic activities to improve functional performance for 8  minutes, including:  Recumbent bike     received therapeutic exercises to develop strength and ROM for 35 minutes including:    Tailgaters 7# ea x 2'  HL hip add ball squeeze 3x10  HL hip abd YTB  3x10  Clamshell YTB 3x10  Bridge YTB 3x10   SL hip abd 3x10 - np  SL hip add 3x10 - np   Prone hip extension 3x10   LAQ 3x10   Heel slides 3x10  Heel props 5 min     neuromuscular re-education activities to improve: Coordination, Kinesthetic, Proprioception, and motor control for 10  minutes. The following activities were included:    SAQ 3x10 hold 3  sec  SLR 3x10  Prone TKE 3x10 hold 2 sec    received the following manual therapy techniques: Soft tissue Mobilization were applied to the: bilat knees for 00 minutes, including:    Seated tibial distraction bilat     PATIENT EDUCATION AND HOME EXERCISES     Home Exercises and Patient Education Provided     Education provided:   - Perform HEP 2 times per day      Written Home Exercises Provided: Patient instructed to cont prior HEP.  Exercises were reviewed and Shiloh was able to demonstrate them prior to the end of the session.  Sihloh demonstrated good  understanding of the education provided.      See EMR under Patient Instructions for exercises provided 5/31/2023.    ASSESSMENT     Pt tolerated session well. Pt cont with mod/high level of pain during community ambulation. She cont with weakness of quad and hip muscles.  Pt had difficult with quad, hip abd, and hip add exercises due to weakness. All exercises performed  non-WB to avoid provocation of pain.  Pt tolerated bike well today with pt educated on seat height and relation to knee ROM. Progressions limited today due to pts reports of increased soreness following previous visit. PT has 2 more appts left. PT will benefit from return to M.D for further investigation of the problem.  Will progress as tolerated.     Shiloh Is progressing well towards her goals.   Pt prognosis is Fair.     Pt will continue to benefit from skilled outpatient physical therapy to address the deficits listed in the problem list box on initial evaluation, provide pt/family education and to maximize pt's level of independence in the home and community environment.     Pt's spiritual, cultural and educational needs considered and pt agreeable to plan of care and goals.     Anticipated barriers to physical therapy: Scheduling issues        GOALS: Short Term Goals:  4 weeks  1.Report decreased in pain at worse less than  <   / =  5  /10  to increase tolerance for functional  mobility.On going  2. Pt to improve B knee range of motion by 25% to allow for improved functional mobility to allow for improvement in IADLs. .On going  3. Increased B LE MMT 1/2 grade to increase tolerance for ADL and work activities.On going  4. Pt to report able to ascending and descending stairs with minor knee pain to improve mobility   5. Pt to tolerate HEP to improve ROM and independence with ADL's.On going     Long Term Goals: 8 weeks  1.Report decreased in pain at worse less than  <   / =  2  /10  to increase tolerance for functional mobility. On going  2.Pt to improve range of motion by 75% to allow for improved functional mobility to allow for improvement in IADLs. On going  3.Increased B LE MMT 1 grade to increase tolerance for ADL and work activities.On going  4. Pt will report 36% on FOTO knee survey  to demonstrate increase in LE function with every day tasks. On going  5. Pt to be Independent with HEP to improve ROM and independence with ADL's. On going  6.Pt to report able to ascending and descending stairs with no knee pain to improve mobility        PLAN     Cont POC    Nas Bradshaw, PT   06/30/2023

## 2023-07-05 ENCOUNTER — PATIENT MESSAGE (OUTPATIENT)
Dept: INTERNAL MEDICINE | Facility: CLINIC | Age: 58
End: 2023-07-05
Payer: OTHER GOVERNMENT

## 2023-07-05 ENCOUNTER — CLINICAL SUPPORT (OUTPATIENT)
Dept: REHABILITATION | Facility: HOSPITAL | Age: 58
End: 2023-07-05
Payer: OTHER GOVERNMENT

## 2023-07-05 DIAGNOSIS — M25.662 DECREASED RANGE OF MOTION OF BOTH KNEES: ICD-10-CM

## 2023-07-05 DIAGNOSIS — M25.661 DECREASED RANGE OF MOTION OF BOTH KNEES: ICD-10-CM

## 2023-07-05 DIAGNOSIS — M25.562 CHRONIC PAIN OF BOTH KNEES: Primary | ICD-10-CM

## 2023-07-05 DIAGNOSIS — R29.898 WEAKNESS OF BOTH LOWER EXTREMITIES: ICD-10-CM

## 2023-07-05 DIAGNOSIS — M25.561 CHRONIC PAIN OF BOTH KNEES: Primary | ICD-10-CM

## 2023-07-05 DIAGNOSIS — M79.671 PAIN OF RIGHT HEEL: Primary | ICD-10-CM

## 2023-07-05 DIAGNOSIS — R26.9 GAIT DIFFICULTY: ICD-10-CM

## 2023-07-05 DIAGNOSIS — G89.29 CHRONIC PAIN OF BOTH KNEES: Primary | ICD-10-CM

## 2023-07-05 PROCEDURE — 97112 NEUROMUSCULAR REEDUCATION: CPT | Mod: PN

## 2023-07-05 PROCEDURE — 97110 THERAPEUTIC EXERCISES: CPT | Mod: PN

## 2023-07-05 NOTE — PROGRESS NOTES
[facility]   Physical Therapy Treatment Note     Name: Shiloh Thayer  Clinic Number: [id number]    Therapy Diagnosis:   Encounter Diagnoses   Name Primary?    Chronic pain of both knees Yes    Weakness of both lower extremities     Decreased range of motion of both knees     Gait difficulty          Physician: Chelsea Mcduffie PA-C    Visit Date: 7/5/2023    Physician Orders: PT Eval and Treat   Medical Diagnosis from Referral:   M25.561,M25.562,G89.29 (ICD-10-CM) - Chronic pain of both knees   M79.604,M79.605 (ICD-10-CM) - Bilateral leg pain      Evaluation Date: 5/31/2023  Authorization Period Expiration: 12-31-23  Plan of Care Expiration: 8-31-23  Visit # / Visits authorized: 6/ 20   Progress Note Due: 6-31-23  FOTO: 1/ 1     Precautions: Standard and hx of Glaucoma, HTN      Time In: 12:27 pm  Time Out: 1:15 pm   Total Billable Time: 23  minutes      SUBJECTIVE     Pt reports:  that she was standing for long period of time yesterday. She states she does not have major pain today. Today is good day for her B knee pain.   She was compliant with home exercise program.  Response to previous treatment: Inc soreness  Functional change: None    Pain: 3/10  Location: bilateral knee     OBJECTIVE     Objective Measures updated at progress report unless specified.       TREATMENT     Shiloh received the treatments listed below:        therapeutic activities to improve functional performance for 8  minutes, including:  Recumbent bike     received therapeutic exercises to develop strength and ROM for 35 minutes including:    Tailgaters 7# ea x 2'  HL hip add ball squeeze 3x10  HL hip abd RTB  3x10  Clamshell RTB 3x10  Bridge RTB 3x10   Prone hip extension  2# 3x10   LAQ 2# 3x10   Heel slides 3x10  Heel props 5 min     neuromuscular re-education activities to improve: Coordination, Kinesthetic, Proprioception, and motor control for 10  minutes. The following activities were included:    SAQ  2# 3x10 hold 3 sec  SLR 2# 3x10  Prone TKE 3x10 hold 2 sec    received the following manual therapy techniques: Soft tissue Mobilization were applied to the: bilat knees for 00 minutes, including:    Seated tibial distraction bilat     PATIENT EDUCATION AND HOME EXERCISES     Home Exercises and Patient Education Provided     Education provided:   - Perform HEP 2 times per day      Written Home Exercises Provided: Patient instructed to cont prior HEP.  Exercises were reviewed and Shiloh was able to demonstrate them prior to the end of the session.  Shiloh demonstrated good  understanding of the education provided.      See EMR under Patient Instructions for exercises provided 5/31/2023.    ASSESSMENT     Pt tolerated session well. Pt presented with decrease B knee pain today. Progression of exercises today. She cont with weakness of quad and hip muscles.  Pt had difficult with quad, hip abd, and hip add exercises due to weakness. All exercises performed  non-WB to avoid provocation of pain.  Pt tolerated bike well today with pt educated on seat height and relation to knee ROM. Progressions limited today due to pts reports of increased soreness following previous visit. PT has 1 more appts left. PT will benefit from return to M.D for further investigation of the problem.  Will progress as tolerated.     Shiloh Is progressing well towards her goals.   Pt prognosis is Fair.     Pt will continue to benefit from skilled outpatient physical therapy to address the deficits listed in the problem list box on initial evaluation, provide pt/family education and to maximize pt's level of independence in the home and community environment.     Pt's spiritual, cultural and educational needs considered and pt agreeable to plan of care and goals.     Anticipated barriers to physical therapy: Scheduling issues        GOALS: Short Term Goals:  4 weeks  1.Report decreased in pain at worse less than  <   / =  5  /10  to increase  tolerance for functional mobility.On going  2. Pt to improve B knee range of motion by 25% to allow for improved functional mobility to allow for improvement in IADLs. .On going  3. Increased B LE MMT 1/2 grade to increase tolerance for ADL and work activities.On going  4. Pt to report able to ascending and descending stairs with minor knee pain to improve mobility   5. Pt to tolerate HEP to improve ROM and independence with ADL's.On going     Long Term Goals: 8 weeks  1.Report decreased in pain at worse less than  <   / =  2  /10  to increase tolerance for functional mobility. On going  2.Pt to improve range of motion by 75% to allow for improved functional mobility to allow for improvement in IADLs. On going  3.Increased B LE MMT 1 grade to increase tolerance for ADL and work activities.On going  4. Pt will report 36% on FOTO knee survey  to demonstrate increase in LE function with every day tasks. On going  5. Pt to be Independent with HEP to improve ROM and independence with ADL's. On going  6.Pt to report able to ascending and descending stairs with no knee pain to improve mobility        PLAN     Cont POC    Nas Bradshaw, PT   07/05/2023

## 2023-07-07 ENCOUNTER — CLINICAL SUPPORT (OUTPATIENT)
Dept: REHABILITATION | Facility: HOSPITAL | Age: 58
End: 2023-07-07
Payer: OTHER GOVERNMENT

## 2023-07-07 DIAGNOSIS — M25.661 DECREASED RANGE OF MOTION OF BOTH KNEES: ICD-10-CM

## 2023-07-07 DIAGNOSIS — R26.9 GAIT DIFFICULTY: ICD-10-CM

## 2023-07-07 DIAGNOSIS — G89.29 CHRONIC PAIN OF BOTH KNEES: Primary | ICD-10-CM

## 2023-07-07 DIAGNOSIS — R29.898 WEAKNESS OF BOTH LOWER EXTREMITIES: ICD-10-CM

## 2023-07-07 DIAGNOSIS — M25.561 CHRONIC PAIN OF BOTH KNEES: Primary | ICD-10-CM

## 2023-07-07 DIAGNOSIS — M25.562 CHRONIC PAIN OF BOTH KNEES: Primary | ICD-10-CM

## 2023-07-07 DIAGNOSIS — M25.662 DECREASED RANGE OF MOTION OF BOTH KNEES: ICD-10-CM

## 2023-07-07 PROCEDURE — 97110 THERAPEUTIC EXERCISES: CPT | Mod: PN

## 2023-07-07 PROCEDURE — 97530 THERAPEUTIC ACTIVITIES: CPT | Mod: PN

## 2023-07-07 PROCEDURE — 97112 NEUROMUSCULAR REEDUCATION: CPT | Mod: PN

## 2023-07-07 NOTE — PROGRESS NOTES
[facility]   Physical Therapy Treatment Note     Name: Shiloh Thayer  Clinic Number: [id number]    Therapy Diagnosis:   Encounter Diagnoses   Name Primary?    Chronic pain of both knees Yes    Weakness of both lower extremities     Decreased range of motion of both knees     Gait difficulty            Physician: Chelsea Mcduffie PA-C    Visit Date: 7/7/2023    Physician Orders: PT Eval and Treat   Medical Diagnosis from Referral:   M25.561,M25.562,G89.29 (ICD-10-CM) - Chronic pain of both knees   M79.604,M79.605 (ICD-10-CM) - Bilateral leg pain      Evaluation Date: 5/31/2023  Authorization Period Expiration: 12-31-23  Plan of Care Expiration: 8-31-23  Visit # / Visits authorized: 7/ 20   Progress Note Due: 8-7-23  FOTO: 1/ 1     Precautions: Standard and hx of Glaucoma, HTN      Time In: 12:07 pm  Time Out: 1:00 pm   Total Billable Time: 53  minutes      SUBJECTIVE     Pt reports:  that she is feeling good today. She states she has not been working so the B knee pain has subsided. 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 She states that she was have a lot pain last week.   She was compliant with home exercise program.  Response to previous treatment: Inc soreness  Functional change: None    Pain: 3/10  Location: bilateral knee     OBJECTIVE     Objective Measures updated at progress report unless specified.         Range of Motion:   Knee Left active   Flexion 110 deg with no pain   Extension Lacking - 5 deg       Knee Right active   Flexion 110 deg wih no pain   Extension Lacking -8 deg with no pain          Lower Extremity Strength   Right LE   Left LE     Knee extension: 4/5 Knee extension: 4/5   Knee flexion: 4+/5 Knee flexion: 4+/5   Hip flexion: 4+/5 Hip flexion: 4+/5   Hip extension:  4+/5 Hip extension: 4+/5   Hip abduction: 4+/5 Hip abduction: 4+/5   Hip adduction: 4+/5 Hip adduction 4+/5   Ankle dorsiflexion: 4+/5 Ankle dorsiflexion: 4+/5   Ankle plantarflexion: 4+/5 Ankle  plantarflexion: 4+/5           TREATMENT     Shiloh received the treatments listed below:        therapeutic activities to improve functional performance for 8  minutes, including:  Recumbent bike     received therapeutic exercises to develop strength and ROM for 35 minutes including:    HL hip add ball squeeze 3x10  HL hip abd GTB  3x10  Clamshell GTB 3x10  Bridge GTB 3x10   Prone hip extension  3# 3x10   LAQ 3# 3x10   Heel slides 3x10  Heel props 5 min     neuromuscular re-education activities to improve: Coordination, Kinesthetic, Proprioception, and motor control for 10  minutes. The following activities were included:    SAQ 3# 3x10 hold 3 sec  SLR 3# 3x10  Prone  TKE 3x10 hold 2 sec    received the following manual therapy techniques: Soft tissue Mobilization were applied to the: bilat knees for 00 minutes, including:    Seated tibial distraction bilat     PATIENT EDUCATION AND HOME EXERCISES     Home Exercises and Patient Education Provided     Education provided:   - Perform HEP 2 times per day      Written Home Exercises Provided: Patient instructed to cont prior HEP.  Exercises were reviewed and Shiloh was able to demonstrate them prior to the end of the session.  Shiloh demonstrated good  understanding of the education provided.      See EMR under Patient Instructions for exercises provided 5/31/2023.    ASSESSMENT     Pt tolerated session well. Pt was re-assessed today. Pt has been on vacation for week. Pain has improved B knee due to vacation. Pt demonstrated improvement in B knee AROM and B LE muscles strength, but pt still have pain during functional mobility. Pt was having severe B knee pain last week. Pt was not making progress in therapy. Pt has met 3/5 STGs. Overall, pt has improved with knee pain since she has not been working, but she has increase pain when she return back to work. Pt will benefit from return to M.D for further investigation. Will progress as tolerated.     Shiloh Is  progressing well towards her goals.   Pt prognosis is Fair.     Pt will continue to benefit from skilled outpatient physical therapy to address the deficits listed in the problem list box on initial evaluation, provide pt/family education and to maximize pt's level of independence in the home and community environment.     Pt's spiritual, cultural and educational needs considered and pt agreeable to plan of care and goals.     Anticipated barriers to physical therapy: Scheduling issues        GOALS: Short Term Goals:  4 weeks  1.Report decreased in pain at worse less than  <   / =  5  /10  to increase tolerance for functional mobility. Goal not met 7-7-23  2. Pt to improve B knee range of motion by 25% to allow for improved functional mobility to allow for improvement in IADLs. . Goal  met 7-7-23  3. Increased B LE MMT 1/2 grade to increase tolerance for ADL and work activities Goal  met 7-7-23  4. Pt to report able to ascending and descending stairs with minor knee pain to improve mobility.  Goal not met 7-7-23  5. Pt to tolerate HEP to improve ROM and independence with ADL's. Goal met 7-7-23     Long Term Goals: 8 weeks  1.Report decreased in pain at worse less than  <   / =  2  /10  to increase tolerance for functional mobility. On going  2.Pt to improve range of motion by 75% to allow for improved functional mobility to allow for improvement in IADLs. On going  3.Increased B LE MMT 1 grade to increase tolerance for ADL and work activities.On going  4. Pt will report 36% on FOTO knee survey  to demonstrate increase in LE function with every day tasks. On going  5. Pt to be Independent with HEP to improve ROM and independence with ADL's. On going  6.Pt to report able to ascending and descending stairs with no knee pain to improve mobility        PLAN     Plan to place therapy on hold until pt return to MINISTERIO Bradshaw, PT   07/07/2023

## 2023-07-14 ENCOUNTER — OFFICE VISIT (OUTPATIENT)
Dept: ORTHOPEDICS | Facility: CLINIC | Age: 58
End: 2023-07-14
Payer: OTHER GOVERNMENT

## 2023-07-14 VITALS — WEIGHT: 249.69 LBS | HEIGHT: 69 IN | BODY MASS INDEX: 36.98 KG/M2

## 2023-07-14 DIAGNOSIS — M17.0 PRIMARY OSTEOARTHRITIS OF BOTH KNEES: Primary | ICD-10-CM

## 2023-07-14 PROCEDURE — 99999 PR PBB SHADOW E&M-EST. PATIENT-LVL III: CPT | Mod: PBBFAC,,, | Performed by: ORTHOPAEDIC SURGERY

## 2023-07-14 PROCEDURE — 99213 OFFICE O/P EST LOW 20 MIN: CPT | Mod: S$GLB,,, | Performed by: ORTHOPAEDIC SURGERY

## 2023-07-14 PROCEDURE — 99213 PR OFFICE/OUTPT VISIT, EST, LEVL III, 20-29 MIN: ICD-10-PCS | Mod: S$GLB,,, | Performed by: ORTHOPAEDIC SURGERY

## 2023-07-14 PROCEDURE — 99999 PR PBB SHADOW E&M-EST. PATIENT-LVL III: ICD-10-PCS | Mod: PBBFAC,,, | Performed by: ORTHOPAEDIC SURGERY

## 2023-07-14 RX ORDER — MELOXICAM 15 MG/1
15 TABLET ORAL DAILY
Qty: 30 TABLET | Refills: 2 | Status: SHIPPED | OUTPATIENT
Start: 2023-07-14 | End: 2023-10-06

## 2023-07-17 ENCOUNTER — OFFICE VISIT (OUTPATIENT)
Dept: PAIN MEDICINE | Facility: CLINIC | Age: 58
End: 2023-07-17
Payer: OTHER GOVERNMENT

## 2023-07-17 VITALS
OXYGEN SATURATION: 18 % | RESPIRATION RATE: 99 BRPM | HEART RATE: 64 BPM | DIASTOLIC BLOOD PRESSURE: 75 MMHG | SYSTOLIC BLOOD PRESSURE: 141 MMHG

## 2023-07-17 DIAGNOSIS — M17.0 PRIMARY OSTEOARTHRITIS OF BOTH KNEES: Primary | ICD-10-CM

## 2023-07-17 DIAGNOSIS — M25.561 CHRONIC PAIN OF BOTH KNEES: ICD-10-CM

## 2023-07-17 DIAGNOSIS — G89.29 CHRONIC PAIN OF BOTH KNEES: ICD-10-CM

## 2023-07-17 DIAGNOSIS — M25.562 CHRONIC PAIN OF BOTH KNEES: ICD-10-CM

## 2023-07-17 PROCEDURE — 99999 PR PBB SHADOW E&M-EST. PATIENT-LVL III: CPT | Mod: PBBFAC,,,

## 2023-07-17 PROCEDURE — 99999 PR PBB SHADOW E&M-EST. PATIENT-LVL III: ICD-10-PCS | Mod: PBBFAC,,,

## 2023-07-17 PROCEDURE — 99213 PR OFFICE/OUTPT VISIT, EST, LEVL III, 20-29 MIN: ICD-10-PCS | Mod: S$GLB,,,

## 2023-07-17 PROCEDURE — 99213 OFFICE O/P EST LOW 20 MIN: CPT | Mod: S$GLB,,,

## 2023-07-17 NOTE — PROGRESS NOTES
Subjective:     Patient ID: Shiloh Thayer is a 57 y.o. female    Chief Complaint: Follow-up (6wk Follow up//)      Referred by: No ref. provider found      HPI:    Interval History PA (07/17/2023):  Patient returns to clinic for follow up.  Patient reports good improvement with course of physical therapy.  She notes improvement in both the overall pain as well as improvement with mobility and ROM.  She continues to do a regular home exercise program with continued benefit.  States that her pain today is at a 2/10.  States previous bilateral knee steroid injections did provide good relief and helped or she attended physical therapy.  She has followed up with Dr. Arora and is further considering knee replacement surgery.  No other concerns at this time.    Initial Encounter (6/5/23):  Shiloh Thayer is a 57 y.o. female who presents today with chronic bilateral knee pain secondary to osteoarthritis.  Was referred by Orthopedic surgery.  Was told that she will likely need knee replacement surgeries, but prior to considering this she needs to undergo physical therapy to improve range of motion/stiffness.  Pain is a limiting factor for physical therapy.  She was referred to discuss potential options for managing her pain to facilitate therapy.  Patient states that she has undergone intra-articular steroid injections in the past.  States that these were done years ago but provided 6 months of good relief.  She is interested in repeating steroid injections at this time.  The pain is constant.  Significantly worsened with standing and walking.  Also particularly bad when initiating activity after rest.   This pain is described in detail below.    Physical Therapy:  Yes.      Non-pharmacologic Treatment:  Rest helps         TENS?  No    Pain Medications:         Currently taking:  Ibuprofen, Voltaren gel, meloxicam    Has tried in the past:  Tylenol    Has not tried:  Opioids,  Muscle relaxants, TCAs, SNRIs,  anticonvulsants    Blood thinners:  None    Interventional Therapies:  None    Relevant Surgeries:  None    Affecting sleep?  Yes    Affecting daily activities? yes    Depressive symptoms? No          SI/HI? No    Work status: Employed    Pain Scores:    Best:       2/10  Worst:     9/10  Usually:   6/10  Today:    2/10    Pain Disability Index  Family/Home Responsibilities:: 2  Recreation:: 0 (Doesn't do)  Social Activity:: 2  Occupation:: 8  Sexual Behavior:: 0  Self Care:: 0  Life-Support Activities:: 0  Pain Disability Index (PDI): 12    Review of Systems   Constitutional:  Negative for activity change, appetite change, chills, fatigue, fever and unexpected weight change.   HENT:  Negative for hearing loss.    Eyes:  Negative for visual disturbance.   Respiratory:  Negative for chest tightness and shortness of breath.    Cardiovascular:  Negative for chest pain.   Gastrointestinal:  Negative for abdominal pain, constipation, diarrhea, nausea and vomiting.   Genitourinary:  Negative for difficulty urinating.   Musculoskeletal:  Positive for arthralgias, gait problem and myalgias. Negative for back pain and neck pain.   Skin:  Negative for rash.   Neurological:  Negative for dizziness, weakness, light-headedness, numbness and headaches.   Psychiatric/Behavioral:  Positive for sleep disturbance. Negative for hallucinations and suicidal ideas. The patient is not nervous/anxious.      Past Medical History:   Diagnosis Date    Glaucoma     Hyperlipidemia     Hypertension     Prediabetes        Past Surgical History:   Procedure Laterality Date     SECTION  1989    CHOLECYSTECTOMY      HYSTERECTOMY         Social History     Socioeconomic History    Marital status:    Tobacco Use    Smoking status: Never    Smokeless tobacco: Never   Substance and Sexual Activity    Alcohol use: Yes     Alcohol/week: 2.0 standard drinks     Types: 2 Glasses of wine per week    Drug use: Not Currently    Sexual activity:  Yes     Partners: Male     Birth control/protection: See Surgical Hx       Review of patient's allergies indicates:  No Known Allergies    Current Outpatient Medications on File Prior to Visit   Medication Sig Dispense Refill    amLODIPine (NORVASC) 5 MG tablet Take 5 mg by mouth once daily.  3    atorvastatin (LIPITOR) 20 MG tablet Take 1 tablet (20 mg total) by mouth once daily. 90 tablet 3    hydroCHLOROthiazide (HYDRODIURIL) 12.5 MG Tab Take 12.5 mg by mouth once daily.  4    ibuprofen (ADVIL,MOTRIN) 600 MG tablet Take 1 tablet (600 mg total) by mouth every 8 (eight) hours as needed for Pain. 30 tablet 0    latanoprost 0.005 % ophthalmic solution       meloxicam (MOBIC) 15 MG tablet Take 1 tablet (15 mg total) by mouth once daily. 30 tablet 2    timolol maleate 0.5% (TIMOPTIC) 0.5 % Drop Place 1 drop into both eyes 2 (two) times daily.       No current facility-administered medications on file prior to visit.       Objective:      BP (!) 141/75 (BP Location: Right arm, Patient Position: Sitting, BP Method: Medium (Automatic))   Pulse 64   Resp (!) 99   LMP  (LMP Unknown) Comment: hysterectomy   SpO2 (!) 18%     Exam:  GEN:  Well developed, well nourished.  No acute distress.   HEENT:  No trauma.  Mucous membranes moist.  Nares patent bilaterally.  PSYCH: Normal affect. Thought content appropriate.  CHEST:  Breathing symmetric.  No audible wheezing.  ABD: Soft, non-distended.  SKIN:  Warm, pink, dry.  No rash on exposed areas.    EXT:  No cyanosis, clubbing, or edema.  No color change or changes in nail or hair growth.  NEURO/MUSCULOSKELETAL:  Fully alert, oriented, and appropriate. Speech normal cami. No cranial nerve deficits.   Gait:  Antalgic.  No focal motor deficits.       Imaging:    Narrative & Impression    EXAMINATION:  XR KNEE 3 VIEW BILATERAL     CLINICAL HISTORY:  Pain in right knee     TECHNIQUE:  AP, lateral, and Merchant views of both knees were performed.     COMPARISON:  None      FINDINGS:  Right knee:     There is moderate medial knee compartment joint space narrowing.  Large osteophyte at the medial and lateral knee margin.  Severe femoral patellar joint space narrowing and large osteophyte at the femoral patellar joint.  Small joint effusion.  No fracture, no osseous lesions.  The soft tissues appear normal.     Left knee:     Severe medial knee compartment joint space narrowing.  Large osteophyte arising from the medial and lateral knee margin.  Severe femoral patellar joint space narrowing with large osteophyte at the femoral patellar joint.  No fracture, no osseous lesions.  Small joint effusion.  The soft tissues appear normal.     Impression:     Moderate to severe degenerative changes detailed above.        Electronically signed by: Gricel Gilbert MD  Date:                                            05/29/2023  Time:                                           15:53       Assessment:       Encounter Diagnoses   Name Primary?    Primary osteoarthritis of both knees Yes    Chronic pain of both knees            Plan:       Shiloh was seen today for follow-up.    Diagnoses and all orders for this visit:    Primary osteoarthritis of both knees    Chronic pain of both knees          Shiloh Thayer is a 57 y.o. female with chronic bilateral knee pain secondary to osteoarthritis.    Prior records reviewed.  Patient is s/p bilateral knee steroid injections with significant relief.  Patient also with good improvement following course of physical therapy and continued home exercise program.  Reports improvement in his overall pain as well as improvement with ROM and mobility.  Continue to follow up with Orthopedics regarding knee replacement surgery.  Return to clinic as needed.    Malcolm Kincaid PA-C  [facility]  Interventional Pain Management   07/17/2023    This note was created by combination of typed  and M-Modal dictation.  Transcription and  phonetic errors may be present.  If there are any questions, please contact me.

## 2023-07-19 ENCOUNTER — OFFICE VISIT (OUTPATIENT)
Dept: PODIATRY | Facility: CLINIC | Age: 58
End: 2023-07-19
Payer: OTHER GOVERNMENT

## 2023-07-19 VITALS — WEIGHT: 249.81 LBS | HEIGHT: 69 IN | BODY MASS INDEX: 37 KG/M2

## 2023-07-19 DIAGNOSIS — M79.671 PAIN OF RIGHT HEEL: ICD-10-CM

## 2023-07-19 DIAGNOSIS — M76.61 ACHILLES TENDINITIS OF RIGHT LOWER EXTREMITY: ICD-10-CM

## 2023-07-19 DIAGNOSIS — M72.2 PLANTAR FASCIITIS: Primary | ICD-10-CM

## 2023-07-19 PROCEDURE — 99203 OFFICE O/P NEW LOW 30 MIN: CPT | Mod: S$GLB,,, | Performed by: PODIATRIST

## 2023-07-19 PROCEDURE — 99203 PR OFFICE/OUTPT VISIT, NEW, LEVL III, 30-44 MIN: ICD-10-PCS | Mod: S$GLB,,, | Performed by: PODIATRIST

## 2023-07-19 PROCEDURE — 99999 PR PBB SHADOW E&M-EST. PATIENT-LVL III: ICD-10-PCS | Mod: PBBFAC,,, | Performed by: PODIATRIST

## 2023-07-19 PROCEDURE — 99999 PR PBB SHADOW E&M-EST. PATIENT-LVL III: CPT | Mod: PBBFAC,,, | Performed by: PODIATRIST

## 2023-07-19 NOTE — PROGRESS NOTES
Subjective:     Patient ID: Shiloh Thayer is a 57 y.o. female.    Chief Complaint: Foot Pain (Right foot)    Shiloh is a 57 y.o. female who presents to the podiatry clinic  with complaint of  right foot pain. Onset of the symptoms was several weeks ago. Precipitating event: none known. Current symptoms include: ability to bear weight, but with some pain. Aggravating factors: any weight bearing. Symptoms have progressed to a point and plateaued. Patient has had no prior foot problems. Evaluation to date: none. Treatment to date: none. Patients rates pain 5/10 on pain scale.    Review of Systems   Constitutional: Negative for chills.   Cardiovascular:  Negative for chest pain and claudication.   Respiratory:  Negative for cough.    Skin:  Positive for color change, dry skin and nail changes.   Musculoskeletal:  Positive for joint pain.   Gastrointestinal:  Negative for nausea.   Neurological:  Positive for paresthesias. Negative for numbness.   Psychiatric/Behavioral:  The patient is not nervous/anxious.       Objective:     Physical Exam  Constitutional:       Appearance: She is well-developed.      Comments: Oriented to time, place, and person.   Cardiovascular:      Comments: DP and PT pulses are palpable bilaterally. 3 sec capillary refill time and toes and feet are warm to touch proximally .  There is  hair growth on the feet and toes b/l. There is no edema b/l. No spider veins or varicosities present b/l.     Musculoskeletal:      Comments: Equinus noted b/l ankles with < 10 deg DF noted. MMT 5/5 in DF/PF/Inv/Ev resistance with no reproduction of pain in any direction. Passive range of motion of ankle and pedal joints is painless b/l.  Pain on palpation plantar medial right  heel, no pain with ROM or MMT or medial and lateral compression of heel, - tinel's sign  Decreased right  ankle joint ROM, pain with palpation of posterior 1/3 calcaneus at region of Achilles tendon insertion. No  pain with ankle joint  ROM        Feet:      Right foot:      Skin integrity: No callus or dry skin.      Left foot:      Skin integrity: No callus or dry skin.   Lymphadenopathy:      Comments: Negative lymphadenopathy bilateral popliteal fossa and tarsal tunnel.   Skin:     Comments: No open lesions, lacerations or wounds noted.Interdigital spaces clean, dry and intact b/l. No erythema noted to b/l foot.  Nails normal color and trophic qualities.     Neurological:      Mental Status: She is alert.      Comments: Light touch, proprioception, and sharp/dull sensation are all intact bilaterally. Protective threshold with the Thendara-Wienstein monofilament is intact bilaterally.    Psychiatric:         Behavior: Behavior is cooperative.         Assessment:      Encounter Diagnoses   Name Primary?    Pain of right heel     Plantar fasciitis Yes    Achilles tendinitis of right lower extremity      Plan:     Shiloh was seen today for foot pain.    Diagnoses and all orders for this visit:    Plantar fasciitis    Pain of right heel  -     Ambulatory referral/consult to Podiatry    Achilles tendinitis of right lower extremity      I counseled the patient on her conditions, their implications and medical management.      Discussed different treatment options for heel pain. including conservative and interventional.  I gave written and verbal instructions on heel cord stretching and this was demonstrated for the patient. Patient expressed understanding. Discussed wearing appropriate shoe gear and avoiding flats, slippers, sandals, barefoot, and sockfeet. Recommended arch supports. My recommendation for OTC supports is [employer], [employer] tennis shoes.       Patient instructed on adequate icing techniques. Patient should ice the affected area at least once per day x 10 minutes for 10 days . I advised the  patient that extra icing would also be beneficial to ensure adequate anti inflammatory effect     Stretching handout dispensed to patient.  Instructions on adequate stretching reviewed in clinic      - Patient will stretch the tendo achilles complex three times daily as demonstrated in the office to lengthen heel cord and increase motion at ankle offloading the load on the forefoot and MTPJ..  Literature was dispensed illustrating proper stretching technique.  - Patient will obtain over the counter arch supports and wear them in shoes whenever possible to support the arches and decrease motion at the MTPJ.    Heel lifts dispensed.     RTC PRN

## 2023-07-21 NOTE — PROGRESS NOTES
Subjective:      Patient ID: Shiloh Thayer is a 57 y.o. female.    Chief Complaint:   Pain of the Left Knee and Pain of the Right Knee    HPI     She returns for follow-up of bilateral knee osteoarthritis pain.  The left side is worse than the right.  She has a painful heel spur on the right that she thinks is contributing Patient reports good improvement with course of physical therapy.  She notes improvement in both the overall pain as well as improvement with mobility and ROM.  She continues to do a regular home exercise program with continued benefit.  States that her pain today is at a 2/10.  States previous bilateral knee steroid injections did provide good relief and helped or she attended physical therapy.      Objective:        Ortho/SPM Exam  There is significant varus deformity, which is minimally passively correctable.  Active range of motion is 5-95 degrees on the left, and 0-110 on the right.  Anterior crepitus with active extension.  Patellar mobility is limited.  Boggy synovitis without effusion.  Medial joint line tenderness predominates.  No instability to varus/valgus/Lachman's stressing.  No pain in the groin with flexion and internal rotation of the hip which is not limited.  Skin intact.  Distal neurovascular intact.  Flip test negative.     Imaging Review:   Recent x-rays done elsewhere without weight-bearing show Kellgren-Benjamín grade 4 end-stage varus gonarthrosis with medial bone loss and large marginal osteophytes  Assessment:   End-stage varus gonarthrosis, bilateral knees    Plan:     She is doing better for now, but she will continue to think about knee replacement surgery.  I again reiterated to her that  stiff knees make stiff knee replacements, and that they should not expect to achieve more flexion postoperatively than they have preoperatively.    The patient's pathophysiology was explained in detail with reference to x-rays, models, other visual aids as appropriate.   Treatment options were discussed in detail.  Questions were invited and answered to the patient's satisfaction.      Fernando Arora MD  Orthopedic Surgery

## 2023-10-06 RX ORDER — MELOXICAM 15 MG/1
15 TABLET ORAL
Qty: 30 TABLET | Refills: 2 | Status: SHIPPED | OUTPATIENT
Start: 2023-10-06 | End: 2024-01-08

## 2023-11-24 ENCOUNTER — IMMUNIZATION (OUTPATIENT)
Dept: INTERNAL MEDICINE | Facility: CLINIC | Age: 58
End: 2023-11-24
Payer: OTHER GOVERNMENT

## 2023-11-24 DIAGNOSIS — Z23 NEED FOR VACCINATION: Primary | ICD-10-CM

## 2023-11-24 PROCEDURE — 90686 IIV4 VACC NO PRSV 0.5 ML IM: CPT | Mod: S$GLB,,, | Performed by: INTERNAL MEDICINE

## 2023-11-24 PROCEDURE — 90471 IMMUNIZATION ADMIN: CPT | Mod: S$GLB,,, | Performed by: INTERNAL MEDICINE

## 2023-11-24 PROCEDURE — 90471 FLU VACCINE (QUAD) GREATER THAN OR EQUAL TO 3YO PRESERVATIVE FREE IM: ICD-10-PCS | Mod: S$GLB,,, | Performed by: INTERNAL MEDICINE

## 2023-11-24 PROCEDURE — 90686 FLU VACCINE (QUAD) GREATER THAN OR EQUAL TO 3YO PRESERVATIVE FREE IM: ICD-10-PCS | Mod: S$GLB,,, | Performed by: INTERNAL MEDICINE

## 2023-12-11 ENCOUNTER — TELEPHONE (OUTPATIENT)
Dept: SLEEP MEDICINE | Facility: CLINIC | Age: 58
End: 2023-12-11
Payer: OTHER GOVERNMENT

## 2023-12-12 ENCOUNTER — OFFICE VISIT (OUTPATIENT)
Dept: SLEEP MEDICINE | Facility: CLINIC | Age: 58
End: 2023-12-12
Payer: OTHER GOVERNMENT

## 2023-12-12 VITALS
DIASTOLIC BLOOD PRESSURE: 83 MMHG | BODY MASS INDEX: 44.46 KG/M2 | SYSTOLIC BLOOD PRESSURE: 129 MMHG | HEART RATE: 65 BPM | WEIGHT: 226.44 LBS | HEIGHT: 60 IN

## 2023-12-12 DIAGNOSIS — G47.00 INSOMNIA, UNSPECIFIED TYPE: ICD-10-CM

## 2023-12-12 DIAGNOSIS — F51.09 OTHER INSOMNIA NOT DUE TO A SUBSTANCE OR KNOWN PHYSIOLOGICAL CONDITION: Primary | ICD-10-CM

## 2023-12-12 DIAGNOSIS — R06.83 SNORING: ICD-10-CM

## 2023-12-12 DIAGNOSIS — I10 HYPERTENSION, UNSPECIFIED TYPE: ICD-10-CM

## 2023-12-12 DIAGNOSIS — R53.83 FATIGUE, UNSPECIFIED TYPE: ICD-10-CM

## 2023-12-12 PROCEDURE — 99999 PR PBB SHADOW E&M-EST. PATIENT-LVL III: CPT | Mod: PBBFAC,,, | Performed by: INTERNAL MEDICINE

## 2023-12-12 PROCEDURE — 99204 PR OFFICE/OUTPT VISIT, NEW, LEVL IV, 45-59 MIN: ICD-10-PCS | Mod: S$GLB,,, | Performed by: INTERNAL MEDICINE

## 2023-12-12 PROCEDURE — 99204 OFFICE O/P NEW MOD 45 MIN: CPT | Mod: S$GLB,,, | Performed by: INTERNAL MEDICINE

## 2023-12-12 PROCEDURE — 99999 PR PBB SHADOW E&M-EST. PATIENT-LVL III: ICD-10-PCS | Mod: PBBFAC,,, | Performed by: INTERNAL MEDICINE

## 2023-12-12 NOTE — PROGRESS NOTES
Referred by Chelsea Mcduffie PA-C     NEW PATIENT VISIT    Shiloh Thayer  is a pleasant 58 y.o. female  with PMH significant for HTN, chronic knee pain who presents for sleep evaluation of insomnia, trouble staying asleep.          12/11/2023     5:45 PM   Sleep Clinic New Patient   What time do you go to bed on a week day? (Give a range) 10:30-11:00   What time do you go to bed on a day off? (Give a range) 12:00   How long does it take you to fall asleep? (Give a range) 1 hour or more   On average, how many times per night do you wake up? 1-2   How long does it take you to fall back into sleep? (Give a range) Long timr   What time do you wake up to start your day on a week day? (Give a range) 5:30-7:30 depending on my work schedule   What time do you wake up to start your day on a day off? (Give a range) 8:00       Past Medical History:   Diagnosis Date    Glaucoma     Hyperlipidemia     Hypertension     Prediabetes      Patient Active Problem List   Diagnosis    Hypertension    Hyperlipidemia    Obesity, Class I, BMI 30.0-34.9 (see actual BMI)    Chronic pain of both knees    Weakness of both lower extremities    Decreased range of motion of both knees    Gait difficulty    Primary osteoarthritis of both knees       Current Outpatient Medications:     amLODIPine (NORVASC) 5 MG tablet, Take 5 mg by mouth once daily., Disp: , Rfl: 3    atorvastatin (LIPITOR) 20 MG tablet, Take 1 tablet (20 mg total) by mouth once daily., Disp: 90 tablet, Rfl: 3    hydroCHLOROthiazide (HYDRODIURIL) 12.5 MG Tab, Take 12.5 mg by mouth once daily., Disp: , Rfl: 4    latanoprost 0.005 % ophthalmic solution, , Disp: , Rfl:     meloxicam (MOBIC) 15 MG tablet, TAKE 1 TABLET BY MOUTH EVERY DAY, Disp: 30 tablet, Rfl: 2    timolol maleate 0.5% (TIMOPTIC) 0.5 % Drop, Place 1 drop into both eyes 2 (two) times daily., Disp: , Rfl:        Vitals:    12/12/23 0855   BP: 129/83   BP Location: Right arm   Patient Position: Lying   Pulse: 65  "  Weight: 102.7 kg (226 lb 6.6 oz)   Height: 5' (1.524 m)     Physical Exam:    GEN:   Well-appearing  Psych:  Appropriate affect, demonstrates insight  SKIN:  No rash on the face or bridge of the nose      LABS:   No results found for: "HGB", "CO2"    RECORDS REVIEWED PREVIOUSLY:        ASSESSMENT      PROBLEM DESCRIPTION/ Sx on Presentation  STATUS   possible JACKIE   + snoring when she weighed more, no witnessed  apneas,  HTN    New   Daytime Sx   + sleepiness when inactive, rarely inactive  ESS 8/24 on intake  New   Insomnia   Duration: several years  Trouble falling asleep: a couple of hours  Maintenance:         waking up around 3Am, hard to get back to sleep  Prior hypnotics:        Current hypnotics: none    New   Nocturia   x 1 per sleep period  New   Other issues:     PLAN     -recommend sleep testing   -discussed trial therapy if JACKIE present and the patient is  open to a trial of CPAP therapy  -discussed CBT for insomnia and the BEBP program.  The patient is  interested, will refer       RTC          The patient was given open opportunity to ask questions and/or express concerns about treatment plan.   All questions/concerns were discussed.     Two patient identifiers used prior to evaluation.           "

## 2024-01-08 RX ORDER — MELOXICAM 15 MG/1
15 TABLET ORAL
Qty: 30 TABLET | Refills: 2 | Status: SHIPPED | OUTPATIENT
Start: 2024-01-08

## 2024-01-09 ENCOUNTER — OFFICE VISIT (OUTPATIENT)
Dept: INTERNAL MEDICINE | Facility: CLINIC | Age: 59
End: 2024-01-09
Payer: OTHER GOVERNMENT

## 2024-01-09 ENCOUNTER — LAB VISIT (OUTPATIENT)
Dept: LAB | Facility: HOSPITAL | Age: 59
End: 2024-01-09
Attending: FAMILY MEDICINE
Payer: OTHER GOVERNMENT

## 2024-01-09 VITALS
BODY MASS INDEX: 42.46 KG/M2 | HEART RATE: 64 BPM | OXYGEN SATURATION: 98 % | SYSTOLIC BLOOD PRESSURE: 130 MMHG | HEIGHT: 61 IN | WEIGHT: 224.88 LBS | DIASTOLIC BLOOD PRESSURE: 86 MMHG

## 2024-01-09 DIAGNOSIS — Z00.00 ENCOUNTER FOR ANNUAL GENERAL MEDICAL EXAMINATION WITHOUT ABNORMAL FINDINGS IN ADULT: ICD-10-CM

## 2024-01-09 DIAGNOSIS — I10 PRIMARY HYPERTENSION: ICD-10-CM

## 2024-01-09 DIAGNOSIS — E66.01 CLASS 2 SEVERE OBESITY DUE TO EXCESS CALORIES WITH SERIOUS COMORBIDITY AND BODY MASS INDEX (BMI) OF 37.0 TO 37.9 IN ADULT: ICD-10-CM

## 2024-01-09 DIAGNOSIS — Z12.11 ENCOUNTER FOR SCREENING FOR MALIGNANT NEOPLASM OF COLON: ICD-10-CM

## 2024-01-09 DIAGNOSIS — H40.89 OTHER GLAUCOMA OF BOTH EYES: ICD-10-CM

## 2024-01-09 DIAGNOSIS — M17.0 PRIMARY OSTEOARTHRITIS OF BOTH KNEES: ICD-10-CM

## 2024-01-09 DIAGNOSIS — Z00.00 ENCOUNTER FOR ANNUAL GENERAL MEDICAL EXAMINATION WITHOUT ABNORMAL FINDINGS IN ADULT: Primary | ICD-10-CM

## 2024-01-09 DIAGNOSIS — Z76.89 ENCOUNTER TO ESTABLISH CARE WITH NEW DOCTOR: ICD-10-CM

## 2024-01-09 DIAGNOSIS — Z12.39 ENCOUNTER FOR OTHER SCREENING FOR MALIGNANT NEOPLASM OF BREAST: ICD-10-CM

## 2024-01-09 DIAGNOSIS — E78.2 MIXED HYPERLIPIDEMIA: ICD-10-CM

## 2024-01-09 DIAGNOSIS — Z23 NEED FOR VACCINATION: ICD-10-CM

## 2024-01-09 PROBLEM — M54.9 BACK PAIN: Status: RESOLVED | Noted: 2024-01-09 | Resolved: 2024-01-09

## 2024-01-09 LAB
ALBUMIN SERPL BCP-MCNC: 4.1 G/DL (ref 3.5–5.2)
ALP SERPL-CCNC: 61 U/L (ref 55–135)
ALT SERPL W/O P-5'-P-CCNC: 23 U/L (ref 10–44)
ANION GAP SERPL CALC-SCNC: 11 MMOL/L (ref 8–16)
AST SERPL-CCNC: 21 U/L (ref 10–40)
BASOPHILS # BLD AUTO: 0.06 K/UL (ref 0–0.2)
BASOPHILS NFR BLD: 1.4 % (ref 0–1.9)
BILIRUB SERPL-MCNC: 0.5 MG/DL (ref 0.1–1)
BUN SERPL-MCNC: 13 MG/DL (ref 6–20)
CALCIUM SERPL-MCNC: 9.8 MG/DL (ref 8.7–10.5)
CHLORIDE SERPL-SCNC: 106 MMOL/L (ref 95–110)
CHOLEST SERPL-MCNC: 263 MG/DL (ref 120–199)
CHOLEST/HDLC SERPL: 3.1 {RATIO} (ref 2–5)
CO2 SERPL-SCNC: 26 MMOL/L (ref 23–29)
CREAT SERPL-MCNC: 0.8 MG/DL (ref 0.5–1.4)
DIFFERENTIAL METHOD BLD: ABNORMAL
EOSINOPHIL # BLD AUTO: 0.2 K/UL (ref 0–0.5)
EOSINOPHIL NFR BLD: 4.8 % (ref 0–8)
ERYTHROCYTE [DISTWIDTH] IN BLOOD BY AUTOMATED COUNT: 14.7 % (ref 11.5–14.5)
EST. GFR  (NO RACE VARIABLE): >60 ML/MIN/1.73 M^2
ESTIMATED AVG GLUCOSE: 108 MG/DL (ref 68–131)
GLUCOSE SERPL-MCNC: 96 MG/DL (ref 70–110)
HBA1C MFR BLD: 5.4 % (ref 4–5.6)
HCT VFR BLD AUTO: 41.8 % (ref 37–48.5)
HCV AB SERPL QL IA: NORMAL
HDLC SERPL-MCNC: 84 MG/DL (ref 40–75)
HDLC SERPL: 31.9 % (ref 20–50)
HGB BLD-MCNC: 13.3 G/DL (ref 12–16)
HIV 1+2 AB+HIV1 P24 AG SERPL QL IA: NORMAL
IMM GRANULOCYTES # BLD AUTO: 0 K/UL (ref 0–0.04)
IMM GRANULOCYTES NFR BLD AUTO: 0 % (ref 0–0.5)
LDLC SERPL CALC-MCNC: 165 MG/DL (ref 63–159)
LYMPHOCYTES # BLD AUTO: 1.6 K/UL (ref 1–4.8)
LYMPHOCYTES NFR BLD: 36.1 % (ref 18–48)
MCH RBC QN AUTO: 29.6 PG (ref 27–31)
MCHC RBC AUTO-ENTMCNC: 31.8 G/DL (ref 32–36)
MCV RBC AUTO: 93 FL (ref 82–98)
MONOCYTES # BLD AUTO: 0.2 K/UL (ref 0.3–1)
MONOCYTES NFR BLD: 5.2 % (ref 4–15)
NEUTROPHILS # BLD AUTO: 2.3 K/UL (ref 1.8–7.7)
NEUTROPHILS NFR BLD: 52.5 % (ref 38–73)
NONHDLC SERPL-MCNC: 179 MG/DL
NRBC BLD-RTO: 0 /100 WBC
PLATELET # BLD AUTO: 383 K/UL (ref 150–450)
PMV BLD AUTO: 9.4 FL (ref 9.2–12.9)
POTASSIUM SERPL-SCNC: 3.7 MMOL/L (ref 3.5–5.1)
PROT SERPL-MCNC: 8.1 G/DL (ref 6–8.4)
RBC # BLD AUTO: 4.5 M/UL (ref 4–5.4)
SODIUM SERPL-SCNC: 143 MMOL/L (ref 136–145)
T4 FREE SERPL-MCNC: 0.92 NG/DL (ref 0.71–1.51)
TRIGL SERPL-MCNC: 70 MG/DL (ref 30–150)
TSH SERPL DL<=0.005 MIU/L-ACNC: 1.17 UIU/ML (ref 0.4–4)
WBC # BLD AUTO: 4.4 K/UL (ref 3.9–12.7)

## 2024-01-09 PROCEDURE — 84443 ASSAY THYROID STIM HORMONE: CPT | Performed by: FAMILY MEDICINE

## 2024-01-09 PROCEDURE — 83036 HEMOGLOBIN GLYCOSYLATED A1C: CPT | Performed by: FAMILY MEDICINE

## 2024-01-09 PROCEDURE — 90471 IMMUNIZATION ADMIN: CPT | Mod: S$GLB,,, | Performed by: FAMILY MEDICINE

## 2024-01-09 PROCEDURE — 84439 ASSAY OF FREE THYROXINE: CPT | Performed by: FAMILY MEDICINE

## 2024-01-09 PROCEDURE — 90715 TDAP VACCINE 7 YRS/> IM: CPT | Mod: S$GLB,,, | Performed by: FAMILY MEDICINE

## 2024-01-09 PROCEDURE — 85025 COMPLETE CBC W/AUTO DIFF WBC: CPT | Performed by: FAMILY MEDICINE

## 2024-01-09 PROCEDURE — 80061 LIPID PANEL: CPT | Performed by: FAMILY MEDICINE

## 2024-01-09 PROCEDURE — 36415 COLL VENOUS BLD VENIPUNCTURE: CPT | Performed by: FAMILY MEDICINE

## 2024-01-09 PROCEDURE — 99999 PR PBB SHADOW E&M-EST. PATIENT-LVL IV: CPT | Mod: PBBFAC,,, | Performed by: FAMILY MEDICINE

## 2024-01-09 PROCEDURE — 99396 PREV VISIT EST AGE 40-64: CPT | Mod: 25,S$GLB,, | Performed by: FAMILY MEDICINE

## 2024-01-09 PROCEDURE — 86803 HEPATITIS C AB TEST: CPT | Performed by: FAMILY MEDICINE

## 2024-01-09 PROCEDURE — 87389 HIV-1 AG W/HIV-1&-2 AB AG IA: CPT | Performed by: FAMILY MEDICINE

## 2024-01-09 PROCEDURE — 80053 COMPREHEN METABOLIC PANEL: CPT | Performed by: FAMILY MEDICINE

## 2024-01-09 RX ORDER — HYDROCHLOROTHIAZIDE 12.5 MG/1
12.5 TABLET ORAL DAILY
Qty: 90 TABLET | Refills: 3 | Status: SHIPPED | OUTPATIENT
Start: 2024-01-09 | End: 2025-01-03

## 2024-01-09 RX ORDER — AMLODIPINE BESYLATE 5 MG/1
5 TABLET ORAL DAILY
Qty: 90 TABLET | Refills: 3 | Status: SHIPPED | OUTPATIENT
Start: 2024-01-09 | End: 2025-01-03

## 2024-01-09 NOTE — PROGRESS NOTES
Subjective:     Patient ID: Shiloh Thayer is a 58 y.o. female.   Chief Complaint: Establish Care    HPI:  Patient presents to establish care.  Patient is due for annual exam and labs.    No acute issues today.      Review of Problems & History:  Patient Active Problem List   Diagnosis    Hypertension    Hyperlipidemia    Obesity, Class I, BMI 30.0-34.9 (see actual BMI)    Chronic pain of both knees    Weakness of both lower extremities    Decreased range of motion of both knees    Gait difficulty    Primary osteoarthritis of both knees    Other specified glaucoma    Insomnia      Past Medical History:   Diagnosis Date    Back pain 2024    Glaucoma     Hyperlipidemia     Hypertension     Prediabetes       Past Surgical History:   Procedure Laterality Date     SECTION      CHOLECYSTECTOMY      HYSTERECTOMY        Social History     Socioeconomic History    Marital status:    Tobacco Use    Smoking status: Never    Smokeless tobacco: Never   Substance and Sexual Activity    Alcohol use: Yes     Alcohol/week: 2.0 standard drinks of alcohol     Types: 2 Glasses of wine per week    Drug use: Not Currently    Sexual activity: Yes     Partners: Male     Birth control/protection: See Surgical Hx     Social Determinants of Health     Financial Resource Strain: Low Risk  (2024)    Overall Financial Resource Strain (CARDIA)     Difficulty of Paying Living Expenses: Not very hard   Food Insecurity: No Food Insecurity (2024)    Hunger Vital Sign     Worried About Running Out of Food in the Last Year: Never true     Ran Out of Food in the Last Year: Never true   Transportation Needs: No Transportation Needs (2024)    PRAPARE - Transportation     Lack of Transportation (Medical): No     Lack of Transportation (Non-Medical): No   Physical Activity: Insufficiently Active (2024)    Exercise Vital Sign     Days of Exercise per Week: 2 days     Minutes of Exercise per Session: 20 min   Stress:  No Stress Concern Present (1/9/2024)    [place] [employer] - Occupational Stress Questionnaire     Feeling of Stress : Not at all   Social Connections: Unknown (1/9/2024)    Social Connection and Isolation Panel [NHANES]     Frequency of Communication with Friends and Family: More than three times a week     Frequency of Social Gatherings with Friends and Family: Once a week     Active Member of Clubs or Organizations: Yes     Attends Club or Organization Meetings: 1 to 4 times per year     Marital Status:    Housing Stability: Low Risk  (1/9/2024)    Housing Stability Vital Sign     Unable to Pay for Housing in the Last Year: No     Number of Places Lived in the Last Year: 1     Unstable Housing in the Last Year: No      Medication Review:    Current Outpatient Medications:     atorvastatin (LIPITOR) 20 MG tablet, Take 1 tablet (20 mg total) by mouth once daily., Disp: 90 tablet, Rfl: 3    meloxicam (MOBIC) 15 MG tablet, TAKE 1 TABLET BY MOUTH EVERY DAY, Disp: 30 tablet, Rfl: 2    timolol maleate 0.5% (TIMOPTIC) 0.5 % Drop, Place 1 drop into both eyes 2 (two) times daily., Disp: , Rfl:     amLODIPine (NORVASC) 5 MG tablet, Take 1 tablet (5 mg total) by mouth once daily., Disp: 90 tablet, Rfl: 3    hydroCHLOROthiazide (HYDRODIURIL) 12.5 MG Tab, Take 1 tablet (12.5 mg total) by mouth once daily., Disp: 90 tablet, Rfl: 3    latanoprost 0.005 % ophthalmic solution, , Disp: , Rfl:      Review of Systems   Constitutional:  Negative for chills, fatigue and fever.   HENT:  Negative for nasal congestion, ear pain, postnasal drip and sore throat.    Eyes:  Negative for visual disturbance.   Respiratory:  Negative for cough, shortness of breath and wheezing.    Cardiovascular:  Negative for chest pain and palpitations.   Gastrointestinal:  Negative for abdominal pain, change in bowel habit, constipation, diarrhea, nausea and vomiting.   Genitourinary:  Negative for dysuria and hematuria.  "  Musculoskeletal:  Negative for back pain, leg pain and neck pain.   Neurological:  Negative for dizziness, numbness and headaches.   Hematological:  Negative for adenopathy.   Psychiatric/Behavioral:  Negative for dysphoric mood, sleep disturbance and suicidal ideas. The patient is not nervous/anxious.           Objective:      Vitals:    01/09/24 1051   BP: 130/86   BP Location: Left arm   Patient Position: Sitting   BP Method: Medium (Manual)   Pulse: 64   SpO2: 98%   Weight: 102 kg (224 lb 13.9 oz)   Height: 5' 1" (1.549 m)      Physical Exam  Vitals and nursing note reviewed.   Constitutional:       General: She is not in acute distress.     Appearance: Normal appearance. She is not ill-appearing.   HENT:      Head: Normocephalic and atraumatic.      Right Ear: Tympanic membrane, ear canal and external ear normal. There is no impacted cerumen.      Left Ear: Tympanic membrane, ear canal and external ear normal. There is no impacted cerumen.      Nose: Nose normal. No congestion or rhinorrhea.      Mouth/Throat:      Mouth: Mucous membranes are moist.      Pharynx: Oropharynx is clear. No oropharyngeal exudate or posterior oropharyngeal erythema.   Eyes:      General: No scleral icterus.        Right eye: No discharge.         Left eye: No discharge.      Conjunctiva/sclera: Conjunctivae normal.   Neck:      Thyroid: No thyroid mass, thyromegaly or thyroid tenderness.   Cardiovascular:      Rate and Rhythm: Normal rate and regular rhythm.      Pulses: Normal pulses.      Heart sounds: Normal heart sounds. No murmur heard.     No friction rub. No gallop.   Pulmonary:      Effort: Pulmonary effort is normal. No respiratory distress.      Breath sounds: Normal breath sounds. No wheezing, rhonchi or rales.   Abdominal:      General: Abdomen is flat. Bowel sounds are normal. There is no distension.      Palpations: Abdomen is soft. There is no mass.      Tenderness: There is no abdominal tenderness. There is no " guarding.   Musculoskeletal:         General: No swelling or deformity. Normal range of motion.      Cervical back: Normal range of motion and neck supple. No tenderness.   Lymphadenopathy:      Cervical: No cervical adenopathy.   Skin:     General: Skin is warm and dry.   Neurological:      General: No focal deficit present.      Mental Status: She is alert and oriented to person, place, and time. Mental status is at baseline.      Gait: Gait normal.      Deep Tendon Reflexes: Reflexes normal.   Psychiatric:         Mood and Affect: Mood normal.         Behavior: Behavior normal.         Thought Content: Thought content normal.         Judgment: Judgment normal.           Assessment:       Problem List Items Addressed This Visit          Ophtho    Other specified glaucoma       Cardiac/Vascular    Hypertension    Relevant Medications    hydroCHLOROthiazide (HYDRODIURIL) 12.5 MG Tab    amLODIPine (NORVASC) 5 MG tablet    Other Relevant Orders    Comprehensive Metabolic Panel (Completed)    Lipid Panel (Completed)    TSH (Completed)    T4, Free (Completed)    Hyperlipidemia    Relevant Orders    Lipid Panel (Completed)       Orthopedic    Primary osteoarthritis of both knees     Other Visit Diagnoses       Encounter for annual general medical examination without abnormal findings in adult    -  Primary    Relevant Orders    Comprehensive Metabolic Panel (Completed)    CBC Auto Differential (Completed)    Lipid Panel (Completed)    Hemoglobin A1C (Completed)    TSH (Completed)    T4, Free (Completed)    HIV 1/2 Ag/Ab (4th Gen) (Completed)    Hepatitis C Antibody (Completed)    Encounter to establish care with new doctor        Need for vaccination        Relevant Orders    Tdap Vaccine (Completed)    Encounter for screening for malignant neoplasm of colon        Relevant Orders    Ambulatory referral/consult to Endo Procedure     Encounter for other screening for malignant neoplasm of breast        Relevant  Orders    Mammo Digital Screening Bilat w/ [facility] (Completed)              Plan:       1. Encounter for annual general medical examination without abnormal findings in adult  Health maintenance updated  Chronic issues reviewed  Fasting labs ordered  -     Comprehensive Metabolic Panel; Future; Expected date: 01/09/2024  -     CBC Auto Differential; Future; Expected date: 01/09/2024  -     Lipid Panel; Future; Expected date: 01/09/2024  -     Hemoglobin A1C; Future; Expected date: 01/09/2024  -     TSH; Future; Expected date: 01/09/2024  -     T4, Free; Future; Expected date: 01/09/2024  -     HIV 1/2 Ag/Ab (4th Gen); Future; Expected date: 01/09/2024  -     Hepatitis C Antibody; Future; Expected date: 01/09/2024    2. Encounter to establish care with new doctor  Reviewed chronic medical conditions, updated problem list and medication list    3. Need for vaccination  COVID declined  Tetanus administered in clinic  Shingles deferred  -     Tdap Vaccine    4. Other glaucoma of both eyes  Stable  Continue to follow-up w/ ophthalmology  Continue timolol drops, no change to meds    5. Primary osteoarthritis of both knees  Stable  Continue mobic for management  Consider escalation of care if sx worsen or interfere with activities    6. Primary hypertension  Stable  F/u labs  Continue amlodipine and HCTZ, refills for both today, no changes in doses  -     Comprehensive Metabolic Panel; Future; Expected date: 01/09/2024  -     Lipid Panel; Future; Expected date: 01/09/2024  -     TSH; Future; Expected date: 01/09/2024  -     T4, Free; Future; Expected date: 01/09/2024  -     hydroCHLOROthiazide (HYDRODIURIL) 12.5 MG Tab; Take 1 tablet (12.5 mg total) by mouth once daily.  Dispense: 90 tablet; Refill: 3  -     amLODIPine (NORVASC) 5 MG tablet; Take 1 tablet (5 mg total) by mouth once daily.  Dispense: 90 tablet; Refill: 3    7. Mixed hyperlipidemia  F/u labs  Continue lipitor at current dose  -     Lipid Panel; Future; Expected  date: 01/09/2024    8. Encounter for screening for malignant neoplasm of colon  Ref for colonoscopy  -     Ambulatory referral/consult to Endo Procedure ; Future; Expected date: 01/10/2024    9. Encounter for other screening for malignant neoplasm of breast  Ref for mammogram  -     Cancel: Mammo Digital Screening Bilat; Future; Expected date: 01/09/2024  -     Mammo Digital Screening Bilat w/ [facility]; Future; Expected date: 01/09/2024             Follow up in about 1 year (around 1/9/2025).     Donnie Au MD, FAAFP  Family Medicine Physician  [facility]  02/06/2024

## 2024-01-16 ENCOUNTER — HOSPITAL ENCOUNTER (OUTPATIENT)
Dept: SLEEP MEDICINE | Facility: HOSPITAL | Age: 59
Discharge: HOME | End: 2024-01-16
Attending: INTERNAL MEDICINE
Payer: OTHER GOVERNMENT

## 2024-01-16 DIAGNOSIS — G47.00 INSOMNIA, UNSPECIFIED TYPE: ICD-10-CM

## 2024-01-16 DIAGNOSIS — R53.83 FATIGUE, UNSPECIFIED TYPE: ICD-10-CM

## 2024-01-16 DIAGNOSIS — I10 HYPERTENSION, UNSPECIFIED TYPE: ICD-10-CM

## 2024-01-16 DIAGNOSIS — R06.83 SNORING: ICD-10-CM

## 2024-01-16 DIAGNOSIS — F51.09 OTHER INSOMNIA NOT DUE TO A SUBSTANCE OR KNOWN PHYSIOLOGICAL CONDITION: ICD-10-CM

## 2024-01-16 NOTE — PROGRESS NOTES
The patient ID was verified. She was instructed on how to turn the Home Sleep Testing device on and off, how to apply the sensors. She was encouraged to sleep on supine position and must have 6 hours of sleep. The patient was instructed not to get the device wet and return it back to us. All questions were answered prior to patient leaving. She was provided the after visit summary.

## 2024-01-17 PROBLEM — G47.00 INSOMNIA: Status: ACTIVE | Noted: 2024-01-17

## 2024-01-17 PROCEDURE — 95800 SLP STDY UNATTENDED: CPT

## 2024-01-18 ENCOUNTER — PATIENT MESSAGE (OUTPATIENT)
Dept: SLEEP MEDICINE | Facility: CLINIC | Age: 59
End: 2024-01-18

## 2024-01-18 DIAGNOSIS — G47.33 SEVERE OBSTRUCTIVE SLEEP APNEA: Primary | ICD-10-CM

## 2024-01-18 PROCEDURE — 95806 SLEEP STUDY UNATT&RESP EFFT: CPT | Mod: 26,,, | Performed by: INTERNAL MEDICINE

## 2024-02-01 ENCOUNTER — CLINICAL SUPPORT (OUTPATIENT)
Dept: ENDOSCOPY | Facility: HOSPITAL | Age: 59
End: 2024-02-01
Attending: FAMILY MEDICINE
Payer: OTHER GOVERNMENT

## 2024-02-01 ENCOUNTER — TELEPHONE (OUTPATIENT)
Dept: ENDOSCOPY | Facility: HOSPITAL | Age: 59
End: 2024-02-01

## 2024-02-01 VITALS — BODY MASS INDEX: 32.43 KG/M2 | WEIGHT: 214 LBS | HEIGHT: 68 IN

## 2024-02-01 DIAGNOSIS — Z12.11 ENCOUNTER FOR SCREENING FOR MALIGNANT NEOPLASM OF COLON: ICD-10-CM

## 2024-02-01 RX ORDER — POLYETHYLENE GLYCOL 3350, SODIUM SULFATE, POTASSIUM CHLORIDE, MAGNESIUM SULFATE, AND SODIUM CHLORIDE FOR ORAL SOLUTION 178.7-7.3G
1 KIT ORAL DAILY
Qty: 2 EACH | Refills: 0 | Status: SHIPPED | OUTPATIENT
Start: 2024-02-01 | End: 2024-02-03

## 2024-02-01 NOTE — TELEPHONE ENCOUNTER
Spoke to patient to schedule procedure(s) Colonoscopy       Physician to perform procedure(s) Dr. PAMELA Olivarez  Date of Procedure (s) 5/13/24  Arrival Time 8:45 AM  Time of Procedure(s) 9:45 AM   Location of Procedure(s) [place] 2nd Floor  Type of Rx Prep sent to patient: Suflave  Instructions provided to patient via [facility]     Patient was informed on the following information and verbalized understanding. Screening questionnaire reviewed with patient and complete. If procedure requires anesthesia, a responsible adult needs to be present to accompany the patient home, patient cannot drive after receiving anesthesia. Appointment details are tentative, especially check-in time. Patient will receive a prep-op call 7 days prior to confirm check-in time for procedure. If applicable the patient should contact their pharmacy to verify Rx for procedure prep is ready for pick-up. Patient was advised to call the scheduling department at [phone] if pharmacy states no Rx is available. Patient was advised to call the endoscopy scheduling department if any questions or concerns arise.         Endoscopy Scheduling Department           Colonoscopy Procedure Prep Instructions     Date of procedure: 5/13/24 Arrive at: 8:45 AM     Location of Department:   [facility] [address] [place] [address]  Take the Elevators to 2nd Floor Endoscopy Procedural Area       As soon as possible:   your prep from pharmacy and over the counter DULCOLAX LAXATIVE TABLETS          On the day before your procedure   What You CAN do:   You may have clear liquids ONLY-see below for list.      What You CANNOT do:   Do not EAT solid food, drink milk or anything   colored red.  Do not drink alcohol.  Do not take oral medications within 1 hour of starting   each dose of SUFLAVE.  No gum chewing or candy morning of procedure     Liquids That Are OK to Drink:   Water  Sports drinks (Gatorade, Power-Aid)  Coffee or  tea (no cream or nondairy creamer)  Clear juices without pulp (apple, white grape)  Gelatin desserts (no fruit or toppings)  Clear soda (sprite, coke, ginger ale)  Chicken broth (until 12 midnight the night before procedure)     Note:      (Please disregard the insert instructions from pharmacy).  SUFLAVE Bowel Prep Kit is indicated for cleansing of the colon as a preparation for colonoscopy in adults.   Be sure to tell your doctor about all the medicines you take, including prescription and non-prescription medicines, vitamins, and herbal supplements. SUFLAVE Bowel Prep Kit may affect how other medicines work.  Medication taken by mouth may not be absorbed properly when taken within 1 hour before the start of each dose of SUFLAVE Bowel Prep Kit.     It is not uncommon to experience some abdominal cramping, nausea and/or vomiting when taking the prep. If you have nausea and/or vomiting while taking the prep, stop drinking for 20 to 30 minutes then continue.        How to take prep:     SUFLAVE Bowel Prep Kit is a (2-day) prep.   Two (2) doses of SUFLAVE are required for a complete preparation. Each dose consists of 1 bottle and 1 flavoring packet. Mix as directed prior to use. You must drink water with each dose of SUFLAVE, and additional water after each dose.     DOSE 1--Day Before Colonoscopy 5/12/24      Drink at least 6 to 8 glasses of clear liquids from time you wake up until you begin your prep and then continue until bedtime to avoid dehydration.      12:00 pm (NOON) Take four (4) Dulcolax (Bisacodyl) tablets with at least 8 ounces or more of clear liquids.       Open ONE (1) flavor-enhancing packet and pour the contents into one (1) bottle. Add lukewarm water up to the fill line. Mix until all the powder has disappeared.    Refrigerate the container.      6:00 pm:     You must complete Steps 1 through 3 before going to bed as shown below:     Step 1-Drink ALL the liquid in the container within   one (1)  hour.   Step 2-You must drink another 16-ounces of clear liquids.   Step 3-Repeat mixing instructions for the 2nd dose and refrigerate for the morning of the procedure.       IMPORTANT: If you experience preparation-related symptoms (for example, nausea, bloating, or cramping), stop or slow the rate of drinking the additional water until your symptoms decrease.     DOSE 2--Day of the Colonoscopy 5/13/24 at 2-3 AM.     For this dose, repeat Steps 1 and 2 shown above.      You may continue drinking water/clear liquids until   4 hours before your colonoscopy or as directed by the scheduling nurse  5:45AM.        For more information about your procedure, please watch this informational video. It is important to watch this animated consent video prior to your arrival. If you haven't watched the video prior to arriving, you are required to watch it during admission which can cause delays.   Options for viewing:  Using a keyboard:  press and hold the control tab (Ctrl) and left mouse click to follow link          Colonoscopy Instructional Video                                                         OR     Type link address into your web browser's address bar:  [email or web address]     Using a mobile phone: tap on web address/link.                 IMPORTANT INFORMATION TO KNOW BEFORE YOUR PROCEDURE     [facility] 2nd Floor     If your procedure requires the administration of anesthesia, it is necessary for a responsible adult to drive you home. (Medical Transportation, Uber, Lyft, Taxi, etc. may ONLY be used if a responsible adult is present to accompany you home.  The responsible adult CAN'T be the  of the service).       person must be available to return to pick you up within 15 minutes of being notified of discharge.      Due to the limited socially distant seating in our waiting room, please limit your guest (1) who accompany you for this procedure. If  someone accompanies you for this procedure into the facility:     Consider having them proceed to an area that is socially distant other than the lobby until a member of the medical team contacts them to provide an update after the procedure.      Also, please consider being dropped off and picked up from the facility.        Please bring a picture ID, insurance card, & copayment     Take Medications as directed below:           If you begin taking any blood thinning medications or injectable weight loss/diabetes medications (other than insulin) , please contact the endoscopy scheduling department listed below as soon as possible     If you are diabetic see the attached instruction sheet regarding your medication.      If you take HEART, BLOOD PRESSURE, SEIZURE, PAIN, LUNG (including inhalers/nebulizers), ANTI-REJECTION (transplant patients), or PSYCHIATRIC medications, please take at your regular times with a sip of water or as directed by the scheduling nurse.      Important contact information:     Endoscopy [phone] Hours of operation Monday-Friday 8:00-4:30pm.     Questions about insurance or financial obligations call [phone] or [phone].     If you have questions regarding the prep or need to reschedule, please call [phone]. After hours questions requiring immediate assistance, contact [facility] On-Call nurse line at [phone] or [phone].   NOTE:      On occasion, unforeseen circumstances may cause a delay in your procedure start time. We respect your time and appreciate your patience during these circumstances.            Suflave Prep Coupon

## 2024-02-01 NOTE — PLAN OF CARE
Spoke to patient to schedule procedure(s) Colonoscopy       Physician to perform procedure(s) Dr. PAMELA Olivarez  Date of Procedure (s) 5/13/24  Arrival Time 8:45 AM  Time of Procedure(s) 9:45 AM   Location of Procedure(s) [place] 2nd Floor  Type of Rx Prep sent to patient: Suflave  Instructions provided to patient via [facility]    Patient was informed on the following information and verbalized understanding. Screening questionnaire reviewed with patient and complete. If procedure requires anesthesia, a responsible adult needs to be present to accompany the patient home, patient cannot drive after receiving anesthesia. Appointment details are tentative, especially check-in time. Patient will receive a prep-op call 7 days prior to confirm check-in time for procedure. If applicable the patient should contact their pharmacy to verify Rx for procedure prep is ready for pick-up. Patient was advised to call the scheduling department at [phone] if pharmacy states no Rx is available. Patient was advised to call the endoscopy scheduling department if any questions or concerns arise.      [facility] Endoscopy Scheduling Department

## 2024-02-02 ENCOUNTER — HOSPITAL ENCOUNTER (OUTPATIENT)
Dept: RADIOLOGY | Facility: HOSPITAL | Age: 59
Discharge: HOME | End: 2024-02-02
Attending: FAMILY MEDICINE
Payer: OTHER GOVERNMENT

## 2024-02-02 DIAGNOSIS — Z12.39 ENCOUNTER FOR OTHER SCREENING FOR MALIGNANT NEOPLASM OF BREAST: ICD-10-CM

## 2024-02-02 PROCEDURE — 77067 SCR MAMMO BI INCL CAD: CPT | Mod: TC

## 2024-02-02 PROCEDURE — 77063 BREAST TOMOSYNTHESIS BI: CPT | Mod: 26,,, | Performed by: RADIOLOGY

## 2024-02-02 PROCEDURE — 77067 SCR MAMMO BI INCL CAD: CPT | Mod: 26,,, | Performed by: RADIOLOGY

## 2024-02-05 ENCOUNTER — PATIENT MESSAGE (OUTPATIENT)
Dept: SLEEP MEDICINE | Facility: CLINIC | Age: 59
End: 2024-02-05
Payer: OTHER GOVERNMENT

## 2024-02-06 ENCOUNTER — PATIENT MESSAGE (OUTPATIENT)
Dept: INTERNAL MEDICINE | Facility: CLINIC | Age: 59
End: 2024-02-06
Payer: OTHER GOVERNMENT

## 2024-02-06 PROBLEM — E66.812 CLASS 2 SEVERE OBESITY DUE TO EXCESS CALORIES WITH SERIOUS COMORBIDITY AND BODY MASS INDEX (BMI) OF 37.0 TO 37.9 IN ADULT: Status: ACTIVE | Noted: 2024-02-06

## 2024-02-06 PROBLEM — E66.01 CLASS 2 SEVERE OBESITY DUE TO EXCESS CALORIES WITH SERIOUS COMORBIDITY AND BODY MASS INDEX (BMI) OF 37.0 TO 37.9 IN ADULT: Status: ACTIVE | Noted: 2024-02-06

## 2024-03-06 ENCOUNTER — OFFICE VISIT (OUTPATIENT)
Dept: PAIN MEDICINE | Facility: CLINIC | Age: 59
End: 2024-03-06
Payer: OTHER GOVERNMENT

## 2024-03-06 VITALS
SYSTOLIC BLOOD PRESSURE: 130 MMHG | OXYGEN SATURATION: 100 % | HEART RATE: 61 BPM | DIASTOLIC BLOOD PRESSURE: 74 MMHG | BODY MASS INDEX: 32.35 KG/M2 | WEIGHT: 212.75 LBS

## 2024-03-06 DIAGNOSIS — M25.561 CHRONIC PAIN OF BOTH KNEES: ICD-10-CM

## 2024-03-06 DIAGNOSIS — G89.29 CHRONIC PAIN OF BOTH KNEES: ICD-10-CM

## 2024-03-06 DIAGNOSIS — M17.0 PRIMARY OSTEOARTHRITIS OF BOTH KNEES: Primary | ICD-10-CM

## 2024-03-06 DIAGNOSIS — M25.562 CHRONIC PAIN OF BOTH KNEES: ICD-10-CM

## 2024-03-06 PROCEDURE — 99999 PR PBB SHADOW E&M-EST. PATIENT-LVL III: CPT | Mod: PBBFAC,,,

## 2024-03-06 PROCEDURE — 99213 OFFICE O/P EST LOW 20 MIN: CPT | Mod: S$GLB,,,

## 2024-03-06 NOTE — PROGRESS NOTES
Subjective:     Patient ID: Shiloh Thayer is a 58 y.o. female    Chief Complaint: Knee Pain (Left knee achy pain and right knee burning pain)      Referred by: No ref. provider found      HPI:    Interval History PA (03/06/2024):  Patient returns to clinic for follow up.  Patient reports return of bilateral knee pain related to osteoarthritis.  Notes that she has an upcoming trip and would like to repeat knee intra-articular steroid injections prior to this trip.  She continues with a home exercise program.  She also continues to follow up with Dr. Arora who is discussing knee replacement surgery.    Interval History PA (07/17/2023):  Patient returns to clinic for follow up.  Patient reports good improvement with course of physical therapy.  She notes improvement in both the overall pain as well as improvement with mobility and ROM.  She continues to do a regular home exercise program with continued benefit.  States that her pain today is at a 2/10.  States previous bilateral knee steroid injections did provide good relief and helped or she attended physical therapy.  She has followed up with Dr. Arora and is further considering knee replacement surgery.  No other concerns at this time.    Initial Encounter (6/5/23):  Shiloh Thayer is a 58 y.o. female who presents today with chronic bilateral knee pain secondary to osteoarthritis.  Was referred by Orthopedic surgery.  Was told that she will likely need knee replacement surgeries, but prior to considering this she needs to undergo physical therapy to improve range of motion/stiffness.  Pain is a limiting factor for physical therapy.  She was referred to discuss potential options for managing her pain to facilitate therapy.  Patient states that she has undergone intra-articular steroid injections in the past.  States that these were done years ago but provided 6 months of good relief.  She is interested in repeating steroid injections at this time.  The pain is  constant.  Significantly worsened with standing and walking.  Also particularly bad when initiating activity after rest.   This pain is described in detail below.    Physical Therapy:  Yes.      Non-pharmacologic Treatment:  Rest helps         TENS?  No    Pain Medications:         Currently taking:  Ibuprofen, Voltaren gel, meloxicam    Has tried in the past:  Tylenol    Has not tried:  Opioids,  Muscle relaxants, TCAs, SNRIs, anticonvulsants    Blood thinners:  None    Interventional Therapies:  None    Relevant Surgeries:  None    Affecting sleep?  Yes    Affecting daily activities? yes    Depressive symptoms? No          SI/HI? No    Work status: Employed    Pain Scores:    Best:       5/10  Worst:     9/10  Usually:   5/10  Today:    7/10    Pain Disability Index  Family/Home Responsibilities:: 9  Recreation:: 6  Social Activity:: 6  Occupation:: 7  Sexual Behavior:: 4  Self Care:: 5  Life-Support Activities:: 5  Pain Disability Index (PDI): 42    Review of Systems   Constitutional:  Negative for activity change, appetite change, chills, fatigue, fever and unexpected weight change.   HENT:  Negative for hearing loss.    Eyes:  Negative for visual disturbance.   Respiratory:  Negative for chest tightness and shortness of breath.    Cardiovascular:  Negative for chest pain.   Gastrointestinal:  Negative for abdominal pain, constipation, diarrhea, nausea and vomiting.   Genitourinary:  Negative for difficulty urinating.   Musculoskeletal:  Positive for arthralgias, gait problem and myalgias. Negative for back pain and neck pain.   Skin:  Negative for rash.   Neurological:  Negative for dizziness, weakness, light-headedness, numbness and headaches.   Psychiatric/Behavioral:  Positive for sleep disturbance. Negative for hallucinations and suicidal ideas. The patient is not nervous/anxious.        Past Medical History:   Diagnosis Date    Back pain 01/09/2024    Glaucoma     Hyperlipidemia     Hypertension      Prediabetes        Past Surgical History:   Procedure Laterality Date     SECTION  1989    CHOLECYSTECTOMY      HYSTERECTOMY         Social History     Socioeconomic History    Marital status:    Tobacco Use    Smoking status: Never    Smokeless tobacco: Never   Substance and Sexual Activity    Alcohol use: Yes     Alcohol/week: 2.0 standard drinks of alcohol     Types: 2 Glasses of wine per week    Drug use: Not Currently    Sexual activity: Yes     Partners: Male     Birth control/protection: See Surgical Hx     Social Determinants of Health     Financial Resource Strain: Low Risk  (2024)    Overall Financial Resource Strain (CARDIA)     Difficulty of Paying Living Expenses: Not very hard   Food Insecurity: No Food Insecurity (2024)    Hunger Vital Sign     Worried About Running Out of Food in the Last Year: Never true     Ran Out of Food in the Last Year: Never true   Transportation Needs: No Transportation Needs (2024)    PRAPARE - Transportation     Lack of Transportation (Medical): No     Lack of Transportation (Non-Medical): No   Physical Activity: Insufficiently Active (2024)    Exercise Vital Sign     Days of Exercise per Week: 2 days     Minutes of Exercise per Session: 20 min   Stress: No Stress Concern Present (2024)    [place] [employer] - Occupational Stress Questionnaire     Feeling of Stress : Not at all   Social Connections: Unknown (2024)    Social Connection and Isolation Panel [NHANES]     Frequency of Communication with Friends and Family: More than three times a week     Frequency of Social Gatherings with Friends and Family: Once a week     Active Member of Clubs or Organizations: Yes     Attends Club or Organization Meetings: 1 to 4 times per year     Marital Status:    Housing Stability: Low Risk  (2024)    Housing Stability Vital Sign     Unable to Pay for Housing in the Last Year: No     Number of Places Lived in the  Last Year: 1     Unstable Housing in the Last Year: No       Review of patient's allergies indicates:  No Known Allergies    Current Outpatient Medications on File Prior to Visit   Medication Sig Dispense Refill    amLODIPine (NORVASC) 5 MG tablet Take 1 tablet (5 mg total) by mouth once daily. 90 tablet 3    hydroCHLOROthiazide (HYDRODIURIL) 12.5 MG Tab Take 1 tablet (12.5 mg total) by mouth once daily. 90 tablet 3    latanoprost 0.005 % ophthalmic solution       meloxicam (MOBIC) 15 MG tablet TAKE 1 TABLET BY MOUTH EVERY DAY 30 tablet 2    timolol maleate 0.5% (TIMOPTIC) 0.5 % Drop Place 1 drop into both eyes 2 (two) times daily.      atorvastatin (LIPITOR) 20 MG tablet Take 1 tablet (20 mg total) by mouth once daily. (Patient not taking: Reported on 3/6/2024) 90 tablet 3     No current facility-administered medications on file prior to visit.       Objective:      /74 (BP Location: Left arm, Patient Position: Sitting, BP Method: Medium (Automatic))   Pulse 61   Wt 96.5 kg (212 lb 11.9 oz)   LMP  (LMP Unknown) Comment: hysterectomy   SpO2 100%   BMI 32.35 kg/m²     Exam:  GEN:  Well developed, well nourished.  No acute distress.   HEENT:  No trauma.  Mucous membranes moist.  Nares patent bilaterally.  PSYCH: Normal affect. Thought content appropriate.  CHEST:  Breathing symmetric.  No audible wheezing.  ABD: Soft, non-distended.  SKIN:  Warm, pink, dry.  No rash on exposed areas.    EXT:  No cyanosis, clubbing, or edema.  No color change or changes in nail or hair growth.  NEURO/MUSCULOSKELETAL:  Fully alert, oriented, and appropriate. Speech normal cami. No cranial nerve deficits.   Gait:  Antalgic.  No focal motor deficits.       Imaging:    Narrative & Impression    EXAMINATION:  XR KNEE 3 VIEW BILATERAL     CLINICAL HISTORY:  Pain in right knee     TECHNIQUE:  AP, lateral, and Merchant views of both knees were performed.     COMPARISON:  None     FINDINGS:  Right knee:     There is moderate  medial knee compartment joint space narrowing.  Large osteophyte at the medial and lateral knee margin.  Severe femoral patellar joint space narrowing and large osteophyte at the femoral patellar joint.  Small joint effusion.  No fracture, no osseous lesions.  The soft tissues appear normal.     Left knee:     Severe medial knee compartment joint space narrowing.  Large osteophyte arising from the medial and lateral knee margin.  Severe femoral patellar joint space narrowing with large osteophyte at the femoral patellar joint.  No fracture, no osseous lesions.  Small joint effusion.  The soft tissues appear normal.     Impression:     Moderate to severe degenerative changes detailed above.        Electronically signed by: Gricel Gilbert MD  Date:                                            05/29/2023  Time:                                           15:53       Assessment:       Encounter Diagnoses   Name Primary?    Primary osteoarthritis of both knees Yes    Chronic pain of both knees              Plan:       Shiloh was seen today for knee pain.    Diagnoses and all orders for this visit:    Primary osteoarthritis of both knees    Chronic pain of both knees            Shiloh Thayer is a 58 y.o. female with chronic bilateral knee pain secondary to osteoarthritis.    Prior records reviewed.  Stressed the importance of maintaining regular home exercise program and being mindful of how they use their back throughout the day.  Patient expressed understanding and agreement.  Continue to follow up with Orthopedics.  Advised patient to follow up with Orthopedics regarding repeat knee steroid injections versus viscosupplementation.    We did discuss potentially performing bilateral genicular nerve blocks/RFA although we will hold off at this time as she would like to repeat steroid injections prior to upcoming vacation.  Likely would prefer patient to have undergo viscosupplementation as well prior to genicular  nerve blocks and RFA.  Return to clinic as needed.    Malcolm Kincaid PA-C  [facility]  Interventional Pain Management   03/06/2024    This note was created by combination of typed  and M-Modal dictation.  Transcription and phonetic errors may be present.  If there are any questions, please contact me.

## 2024-03-08 DIAGNOSIS — E78.5 HYPERLIPIDEMIA, UNSPECIFIED HYPERLIPIDEMIA TYPE: ICD-10-CM

## 2024-03-08 RX ORDER — ATORVASTATIN CALCIUM 20 MG/1
20 TABLET, FILM COATED ORAL
Qty: 90 TABLET | Refills: 3 | Status: SHIPPED | OUTPATIENT
Start: 2024-03-08

## 2024-04-16 RX ORDER — MELOXICAM 15 MG/1
15 TABLET ORAL
Qty: 30 TABLET | Refills: 2 | Status: SHIPPED | OUTPATIENT
Start: 2024-04-16

## 2024-05-06 ENCOUNTER — TELEPHONE (OUTPATIENT)
Dept: ENDOSCOPY | Facility: HOSPITAL | Age: 59
End: 2024-05-06
Payer: OTHER GOVERNMENT

## 2024-05-06 ENCOUNTER — TELEPHONE (OUTPATIENT)
Dept: GASTROENTEROLOGY | Facility: CLINIC | Age: 59
End: 2024-05-06
Payer: OTHER GOVERNMENT

## 2024-05-06 DIAGNOSIS — Z12.11 SCREEN FOR COLON CANCER: Primary | ICD-10-CM

## 2024-05-06 DIAGNOSIS — Z12.11 ENCOUNTER FOR SCREENING FOR MALIGNANT NEOPLASM OF COLON: Primary | ICD-10-CM

## 2024-05-06 RX ORDER — POLYETHYLENE GLYCOL 3350, SODIUM SULFATE, POTASSIUM CHLORIDE, MAGNESIUM SULFATE, AND SODIUM CHLORIDE FOR ORAL SOLUTION 178.7-7.3G
1 KIT ORAL DAILY
Qty: 2 EACH | Refills: 0 | Status: SHIPPED | OUTPATIENT
Start: 2024-05-06 | End: 2024-05-08

## 2024-05-06 NOTE — TELEPHONE ENCOUNTER
Left voicemail and sent portal message for patient to call Endoscopy Scheduling to review instructions and confirm appointment for Colonoscopy on 5/13/24. Rx resent to [facility]/pharmacy #[id number] - [place], [place] - [facility].

## 2024-05-06 NOTE — TELEPHONE ENCOUNTER
----- Message from Fani Diane sent at 5/6/2024  9:56 AM CDT -----  Regarding: Pre op Med  Good Morning ,     Pt scheduled for procedure 05/13 , Pt stated the pre op med is not on the pharmacy . Please contact pt . Thank you

## 2024-05-08 ENCOUNTER — TELEPHONE (OUTPATIENT)
Dept: GASTROENTEROLOGY | Facility: CLINIC | Age: 59
End: 2024-05-08
Payer: OTHER GOVERNMENT

## 2024-05-08 NOTE — TELEPHONE ENCOUNTER
----- Message from Luz Elena Frederick sent at 5/8/2024  1:36 PM CDT -----  Regarding: Appt  Contact: [phone]  Pt calling to reschedule Colonoscopy appt on 5/13. Please call [phone]

## 2024-05-09 ENCOUNTER — TELEPHONE (OUTPATIENT)
Dept: ENDOSCOPY | Facility: HOSPITAL | Age: 59
End: 2024-05-09
Payer: OTHER GOVERNMENT

## 2024-05-09 NOTE — TELEPHONE ENCOUNTER
Spoke to patient to reschedule Colonoscopy       Physician to perform procedure(s) Dr. PAMELA Olivarez  Date of Procedure (s) 7/23/24  Arrival Time 1:00 PM  Time of Procedure(s) 2:00 PM   Location of Procedure(s) [place] 2nd Floor  Type of Rx Prep sent to patient's pharmacy: Suflave  Instructions provided to patient via [facility]    The following information was discussed with patient, and patient verbalized understanding:  Screening questionnaire reviewed with patient and complete. If procedure requires anesthesia, a responsible adult needs to be present to accompany the patient home. Appointment details are tentative, especially check-in time. Patient will receive a pre-op call 7 days prior to appointment to confirm check-in time for procedure. If applicable the patient should contact their pharmacy to verify Rx for procedure prep is ready for pick-up. Patient was instructed to call the scheduling department at [phone] if pharmacy states no Rx is available. Patient was also advised to call the endoscopy scheduling department if any questions or concerns arise.      [facility] Endoscopy Scheduling Department

## 2024-06-10 ENCOUNTER — PATIENT MESSAGE (OUTPATIENT)
Dept: INTERNAL MEDICINE | Facility: CLINIC | Age: 59
End: 2024-06-10
Payer: OTHER GOVERNMENT

## 2024-07-10 RX ORDER — MELOXICAM 15 MG/1
15 TABLET ORAL
Qty: 30 TABLET | Refills: 2 | Status: SHIPPED | OUTPATIENT
Start: 2024-07-10

## 2024-07-11 ENCOUNTER — PATIENT MESSAGE (OUTPATIENT)
Dept: GASTROENTEROLOGY | Facility: CLINIC | Age: 59
End: 2024-07-11
Payer: OTHER GOVERNMENT

## 2024-07-11 ENCOUNTER — TELEPHONE (OUTPATIENT)
Dept: GASTROENTEROLOGY | Facility: CLINIC | Age: 59
End: 2024-07-11
Payer: OTHER GOVERNMENT

## 2024-07-11 NOTE — TELEPHONE ENCOUNTER
----- Message from Lisa Rolon sent at 7/11/2024  9:49 AM CDT -----  Regarding: Arrival Time  Contact: pt @ [phone]  Pt is calling to get arrival time and instructions for colonoscopy. Asking for a call back

## 2024-07-11 NOTE — TELEPHONE ENCOUNTER
MA called and spoke with patient and went over the arrival time for procedure and sent to the patient portal and stated if patient had any questions she can give a call

## 2024-07-17 ENCOUNTER — ANESTHESIA EVENT (OUTPATIENT)
Dept: ENDOSCOPY | Facility: HOSPITAL | Age: 59
End: 2024-07-17
Payer: OTHER GOVERNMENT

## 2024-07-17 NOTE — ANESTHESIA PREPROCEDURE EVALUATION
07/17/2024  Shiloh Thayer is a 58 y.o., female.  [facility]  Anesthesia Pre-Operative Evaluation       Patient Name: Shiloh Thayer  YOB: 1965  MRN: [id number]  CSN: [id number]      Code Status: No Order   Date of Procedure: 7/23/2024  Anesthesia: Choice Procedure: Procedure(s) (LRB):  COLONOSCOPY (N/A)  Pre-Operative Diagnosis: Encounter for screening for malignant neoplasm of colon [Z12.11]  Proceduralist: Surgeons and Role:     * Jarred Olivarez MD - Primary Nurse: (Unknown)      SUBJECTIVE:   Shiloh Thayer is a 58 y.o. female who  has a past medical history of Back pain (01/09/2024), Glaucoma, Hyperlipidemia, Hypertension, and Prediabetes..     she has a current medication list which includes the following long-term medication(s): amlodipine, atorvastatin, hydrochlorothiazide, latanoprost, and timolol maleate 0.5%.     ALLERGIES:   Review of patient's allergies indicates:  No Known Allergies  LDA:          Lines/Drains/Airways       None                  Anesthesia Evaluation      Airway   TM distance: Normal  Neck ROM: Normal ROM  Dental      Pulmonary    Cardiovascular   (+) hypertension    Rate: Normal    Neuro/Psych      GI/Hepatic/Renal      Endo/Other    (+) arthritis (Osteoarthritis)  Abdominal                     MEDICATIONS:     Antibiotics (From admission, onward)      None          VTE Risk Mitigation (From admission, onward)      None              No current facility-administered medications for this encounter.     Current Outpatient Medications   Medication Sig Dispense Refill    amLODIPine (NORVASC) 5 MG tablet Take 1 tablet (5 mg total) by mouth once daily. 90 tablet 3    atorvastatin (LIPITOR) 20 MG tablet TAKE 1 TABLET BY MOUTH EVERY DAY 90 tablet 3    hydroCHLOROthiazide (HYDRODIURIL) 12.5 MG Tab Take 1 tablet (12.5 mg total) by mouth once daily.  90 tablet 3    latanoprost 0.005 % ophthalmic solution       meloxicam (MOBIC) 15 MG tablet TAKE 1 TABLET BY MOUTH EVERY DAY 30 tablet 2    timolol maleate 0.5% (TIMOPTIC) 0.5 % Drop Place 1 drop into both eyes 2 (two) times daily.            History:   There are no hospital problems to display for this patient.    Surgical History:    has a past surgical history that includes Cholecystectomy; Hysterectomy; and  section ().   Social History:    reports being sexually active and has had partner(s) who are male. She reports using the following method of birth control/protection: See Surgical Hx.  reports that she has never smoked. She has never used smokeless tobacco. She reports current alcohol use of about 2.0 standard drinks of alcohol per week. She reports that she does not currently use drugs.     OBJECTIVE:     Vital Signs (Most Recent):    Vital Signs Range (Last 24H):          There is no height or weight on file to calculate BMI.   Wt Readings from Last 4 Encounters:   24 96.5 kg (212 lb 11.9 oz)   24 97.1 kg (214 lb)   24 102 kg (224 lb 13.9 oz)   23 102.7 kg (226 lb 6.6 oz)       Significant Labs:  Lab Results   Component Value Date    WBC 4.40 2024    HGB 13.3 2024    HCT 41.8 2024     2024     2024    K 3.7 2024     2024    CREATININE 0.8 2024    BUN 13 2024    CO2 26 2024    GLU 96 2024    CALCIUM 9.8 2024    ALKPHOS 61 2024    ALT 23 2024    AST 21 2024    ALBUMIN 4.1 2024    HGBA1C 5.4 2024     No LMP recorded (lmp unknown). Patient has had a hysterectomy.  No results found for this or any previous visit (from the past 72 hour(s)).    EKG:   Results for orders placed or performed during the hospital encounter of 10/31/20   EKG 12-lead    Collection Time: 10/31/20  3:40 PM    Narrative    Test Reason : M54.2,    Vent. Rate : 051 BPM     Atrial Rate  ": 051 BPM     P-R Int : 184 ms          QRS Dur : 094 ms      QT Int : 454 ms       P-R-T Axes : 020 000 022 degrees     QTc Int : 418 ms    Sinus bradycardia  Moderate voltage criteria for LVH, may be normal variant  Borderline Abnormal ECG  No previous ECGs available  Confirmed by Som Bethea MD ([id number]) on 11/1/2020 11:53:56 AM    Referred By: [email or web address]   SELF           Confirmed By:Som Bethea MD       TTE:  No results found for this or any previous visit.  No results found for: "EF"   No results found for this or any previous visit.  KAROL:  No results found for this or any previous visit.  Stress Test:  No results found for this or any previous visit.     LHC:  No results found for this or any previous visit.     PFT:  No results found for: "FEV1", "FVC", "HCX7PZO", "TLC", "DLCO"     ASSESSMENT/PLAN:        Pre-op Assessment    I have reviewed the Patient Summary Reports.     I have reviewed the Nursing Notes. I have reviewed the NPO Status.   I have reviewed the Medications.     Review of Systems  Anesthesia Hx:  No problems with previous Anesthesia             Denies Family Hx of Anesthesia complications.    Denies Personal Hx of Anesthesia complications.                    Social:  Alcohol Use, Non-Smoker Insommnia      Hematology/Oncology:  Hematology Normal   Oncology Normal                                   EENT/Dental:  EENT/Dental Normal           Cardiovascular:     Hypertension           hyperlipidemia                             Pulmonary:  Pulmonary Normal                       Renal/:  Renal/ Normal                 Hepatic/GI:  Hepatic/GI Normal                 Musculoskeletal:  Arthritis (Osteoarthritis)   Chronic pain both knees            Neurological:  Neurology Normal                                      Endocrine:  Endocrine Normal          Obesity / BMI > 30  Dermatological:  Skin Normal    Psych:  Psychiatric Normal                    Physical Exam  General: Well nourished, Cooperative, " Alert and Oriented    Airway:  Mouth Opening: Normal  TM Distance: Normal  Tongue: Normal  Neck ROM: Normal ROM    Chest/Lungs:  Clear to auscultation    Heart:  Rate: Normal    Abdomen:  Normal        Anesthesia Plan  Type of Anesthesia, risks & benefits discussed:    Anesthesia Type: Gen Natural Airway  Intra-op Monitoring Plan: Standard ASA Monitors  Post Op Pain Control Plan: multimodal analgesia  Induction:  IV  Informed Consent: Informed consent signed with the Patient and all parties understand the risks and agree with anesthesia plan.  All questions answered.   ASA Score: 2  Day of Surgery Review of History & Physical: H&P Update referred to the surgeon/provider.    Ready For Surgery From Anesthesia Perspective.     .

## 2024-07-18 ENCOUNTER — TELEPHONE (OUTPATIENT)
Dept: ENDOSCOPY | Facility: HOSPITAL | Age: 59
End: 2024-07-18
Payer: OTHER GOVERNMENT

## 2024-07-18 ENCOUNTER — PATIENT MESSAGE (OUTPATIENT)
Dept: ENDOSCOPY | Facility: HOSPITAL | Age: 59
End: 2024-07-18
Payer: OTHER GOVERNMENT

## 2024-07-18 NOTE — TELEPHONE ENCOUNTER
Confirmed Colonoscopy appt for 7/23/24 with pt. Reviewed instructions. Patient verbalized understanding. Confirmed ride home after procedure. Suflave prep instructions sent to portal.

## 2024-07-23 ENCOUNTER — HOSPITAL ENCOUNTER (OUTPATIENT)
Facility: HOSPITAL | Age: 59
Discharge: HOME | End: 2024-07-23
Attending: STUDENT IN AN ORGANIZED HEALTH CARE EDUCATION/TRAINING PROGRAM | Admitting: STUDENT IN AN ORGANIZED HEALTH CARE EDUCATION/TRAINING PROGRAM
Payer: OTHER GOVERNMENT

## 2024-07-23 ENCOUNTER — ANESTHESIA (OUTPATIENT)
Dept: ENDOSCOPY | Facility: HOSPITAL | Age: 59
End: 2024-07-23
Payer: OTHER GOVERNMENT

## 2024-07-23 VITALS
HEIGHT: 67 IN | HEART RATE: 66 BPM | OXYGEN SATURATION: 100 % | SYSTOLIC BLOOD PRESSURE: 133 MMHG | BODY MASS INDEX: 33.59 KG/M2 | RESPIRATION RATE: 16 BRPM | TEMPERATURE: 98 F | DIASTOLIC BLOOD PRESSURE: 67 MMHG | WEIGHT: 214 LBS

## 2024-07-23 DIAGNOSIS — Z12.11 COLON CANCER SCREENING: Primary | ICD-10-CM

## 2024-07-23 PROCEDURE — 99900035 HC TECH TIME PER 15 MIN (STAT)

## 2024-07-23 PROCEDURE — 94761 N-INVAS EAR/PLS OXIMETRY MLT: CPT

## 2024-07-23 PROCEDURE — 37000008 HC ANESTHESIA 1ST 15 MINUTES: Performed by: STUDENT IN AN ORGANIZED HEALTH CARE EDUCATION/TRAINING PROGRAM

## 2024-07-23 PROCEDURE — 37000009 HC ANESTHESIA EA ADD 15 MINS: Performed by: STUDENT IN AN ORGANIZED HEALTH CARE EDUCATION/TRAINING PROGRAM

## 2024-07-23 PROCEDURE — 25000003 PHARM REV CODE 250: Performed by: NURSE ANESTHETIST, CERTIFIED REGISTERED

## 2024-07-23 PROCEDURE — 63600175 PHARM REV CODE 636 W HCPCS: Performed by: NURSE ANESTHETIST, CERTIFIED REGISTERED

## 2024-07-23 PROCEDURE — 45385 COLONOSCOPY W/LESION REMOVAL: CPT | Mod: 33,,, | Performed by: STUDENT IN AN ORGANIZED HEALTH CARE EDUCATION/TRAINING PROGRAM

## 2024-07-23 PROCEDURE — 88305 TISSUE EXAM BY PATHOLOGIST: CPT | Performed by: STUDENT IN AN ORGANIZED HEALTH CARE EDUCATION/TRAINING PROGRAM

## 2024-07-23 PROCEDURE — 88305 TISSUE EXAM BY PATHOLOGIST: CPT | Mod: 26,,, | Performed by: STUDENT IN AN ORGANIZED HEALTH CARE EDUCATION/TRAINING PROGRAM

## 2024-07-23 PROCEDURE — 45380 COLONOSCOPY AND BIOPSY: CPT | Mod: PT,59 | Performed by: STUDENT IN AN ORGANIZED HEALTH CARE EDUCATION/TRAINING PROGRAM

## 2024-07-23 PROCEDURE — 45385 COLONOSCOPY W/LESION REMOVAL: CPT | Mod: PT | Performed by: STUDENT IN AN ORGANIZED HEALTH CARE EDUCATION/TRAINING PROGRAM

## 2024-07-23 PROCEDURE — 27201089 HC SNARE, DISP (ANY): Performed by: STUDENT IN AN ORGANIZED HEALTH CARE EDUCATION/TRAINING PROGRAM

## 2024-07-23 PROCEDURE — 45380 COLONOSCOPY AND BIOPSY: CPT | Mod: 33,59,, | Performed by: STUDENT IN AN ORGANIZED HEALTH CARE EDUCATION/TRAINING PROGRAM

## 2024-07-23 RX ORDER — SODIUM CHLORIDE 9 MG/ML
INJECTION, SOLUTION INTRAVENOUS CONTINUOUS
Status: DISCONTINUED | OUTPATIENT
Start: 2024-07-23 | End: 2024-07-23 | Stop reason: HOSPADM

## 2024-07-23 RX ORDER — PROPOFOL 10 MG/ML
VIAL (ML) INTRAVENOUS
Status: DISCONTINUED | OUTPATIENT
Start: 2024-07-23 | End: 2024-07-23

## 2024-07-23 RX ORDER — LIDOCAINE HYDROCHLORIDE 20 MG/ML
INJECTION INTRAVENOUS
Status: DISCONTINUED | OUTPATIENT
Start: 2024-07-23 | End: 2024-07-23

## 2024-07-23 RX ORDER — PROPOFOL 10 MG/ML
VIAL (ML) INTRAVENOUS CONTINUOUS PRN
Status: DISCONTINUED | OUTPATIENT
Start: 2024-07-23 | End: 2024-07-23

## 2024-07-23 RX ADMIN — GLYCOPYRROLATE 0.1 MG: 0.2 INJECTION, SOLUTION INTRAMUSCULAR; INTRAVENOUS at 12:07

## 2024-07-23 RX ADMIN — PROPOFOL 200 MCG/KG/MIN: 10 INJECTION, EMULSION INTRAVENOUS at 12:07

## 2024-07-23 RX ADMIN — PROPOFOL 100 MG: 10 INJECTION, EMULSION INTRAVENOUS at 12:07

## 2024-07-23 RX ADMIN — SODIUM CHLORIDE: 0.9 INJECTION, SOLUTION INTRAVENOUS at 12:07

## 2024-07-23 RX ADMIN — LIDOCAINE HYDROCHLORIDE 100 MG: 20 INJECTION INTRAVENOUS at 12:07

## 2024-07-23 NOTE — TRANSFER OF CARE
"Anesthesia Transfer of Care Note    Patient: Shiloh Thayer    Procedure(s) Performed: Procedure(s) (LRB):  COLONOSCOPY (N/A)    Patient location: PACU    Anesthesia Type: general    Transport from OR: Transported from OR on room air with adequate spontaneous ventilation    Post pain: adequate analgesia    Post assessment: no apparent anesthetic complications and tolerated procedure well    Post vital signs: stable    Level of consciousness: responds to stimulation and sedated    Nausea/Vomiting: no nausea/vomiting    Complications: none    Transfer of care protocol was followed      Last vitals: Visit Vitals  /81 (BP Location: Left arm, Patient Position: Lying)   Pulse 60   Temp 36.5 °C (97.7 °F) (Temporal)   Resp 16   Ht 5' 7" (1.702 m)   Wt 97.1 kg (214 lb)   LMP  (LMP Unknown)   SpO2 100%   Breastfeeding No   BMI 33.52 kg/m²     "

## 2024-07-23 NOTE — ANESTHESIA POSTPROCEDURE EVALUATION
Anesthesia Post Evaluation    Patient: Shiloh Thayer    Procedure(s) Performed: Procedure(s) (LRB):  COLONOSCOPY (N/A)    Final Anesthesia Type: general      Patient location during evaluation: GI PACU  Patient participation: Yes- Able to Participate  Level of consciousness: awake and alert, oriented and awake  Post-procedure vital signs: reviewed and stable  Pain management: adequate  Airway patency: patent  JACKIE mitigation strategies: Multimodal analgesia  PONV status at discharge: No PONV  Anesthetic complications: no      Cardiovascular status: blood pressure returned to baseline and hemodynamically stable  Respiratory status: unassisted and spontaneous ventilation  Hydration status: euvolemic  Follow-up not needed.              Vitals Value Taken Time   /67 07/23/24 1345   Temp 36.5 °C (97.7 °F) 07/23/24 1315   Pulse 66 07/23/24 1345   Resp 24 07/23/24 1345   SpO2 100 % 07/23/24 1345   Vitals shown include unfiled device data.      Event Time   Out of Recovery 13:30:00         Pain/Rob Score: Rob Score: 10 (7/23/2024  1:30 PM)

## 2024-07-23 NOTE — PLAN OF CARE
Pt. AAOx4. Care completed. Discharge instructions explained. All questions answered. Pt transferred via wheelchair. NAD noted.

## 2024-07-23 NOTE — PROVATION PATIENT INSTRUCTIONS
Discharge Summary/Instructions after an Endoscopic Procedure  Patient Name: Shiloh Thayer  Patient MRN: [id number]  Patient YOB: 1965 Tuesday, July 23, 2024  Jarred Olivarez MD  Dear patient,  As a result of recent federal legislation (The Federal Cures Act), you may   receive lab or pathology results from your procedure in your [facility]   account before your physician is able to contact you. Your physician or   their representative will relay the results to you with their   recommendations at their soonest availability.  Thank you,  RESTRICTIONS:  During your procedure today, you received medications for sedation.  These   medications may affect your judgment, balance and coordination.  Therefore,   for 24 hours, you have the following restrictions:   - DO NOT drive a car, operate machinery, make legal/financial decisions,   sign important papers or drink alcohol.    ACTIVITY:  Today: no heavy lifting, straining or running due to procedural   sedation/anesthesia.  The following day: return to full activity including work.  DIET:  Eat and drink normally unless instructed otherwise.     TREATMENT FOR COMMON SIDE EFFECTS:  - Mild abdominal pain, nausea, belching, bloating or excessive gas:  rest,   eat lightly and use a heating pad.  - Sore Throat: treat with throat lozenges and/or gargle with warm salt   water.  - Because air was used during the procedure, expelling large amounts of air   from your rectum or belching is normal.  - If a bowel prep was taken, you may not have a bowel movement for 1-3 days.    This is normal.  SYMPTOMS TO WATCH FOR AND REPORT TO YOUR PHYSICIAN:  1. Abdominal pain or bloating, other than gas cramps.  2. Chest pain.  3. Back pain.  4. Signs of infection such as: chills or fever occurring within 24 hours   after the procedure.  5. Rectal bleeding, which would show as bright red, maroon, or black stools.   (A tablespoon of blood from the rectum is not serious, especially  if   hemorrhoids are present.)  6. Vomiting.  7. Weakness or dizziness.  GO DIRECTLY TO THE NEAREST EMERGENCY ROOM IF YOU HAVE ANY OF THE FOLLOWING:      Difficulty breathing              Chills and/or fever over 101 F   Persistent vomiting and/or vomiting blood   Severe abdominal pain   Severe chest pain   Black, tarry stools   Bleeding- more than one tablespoon   Any other symptom or condition that you feel may need urgent attention  Your doctor recommends these additional instructions:  If any biopsies were taken, your doctors clinic will contact you in 1 to 2   weeks with any results.  - Discharge patient to home (ambulatory).   - Patient has a contact number available for emergencies.  The signs and   symptoms of potential delayed complications were discussed with the   patient.  Return to normal activities tomorrow.  Written discharge   instructions were provided to the patient.   - Resume previous diet.   - Continue present medications.   - Return to primary care physician as previously scheduled.   - Repeat colonoscopy in 7 years for surveillance.  For questions, problems or results please call your physician - Jarred Olivarez MD at Work:  [phone].  [facility], EMERGENCY ROOM PHONE NUMBER: [phone]  IF A COMPLICATION OR EMERGENCY SITUATION ARISES AND YOU ARE UNABLE TO REACH   YOUR PHYSICIAN - GO DIRECTLY TO THE EMERGENCY ROOM.  Jarred Olivarez MD  7/23/2024 1:13:42 PM  This report has been verified and signed electronically.  Dear patient,  As a result of recent federal legislation (The Federal Cures Act), you may   receive lab or pathology results from your procedure in your [facility]   account before your physician is able to contact you. Your physician or   their representative will relay the results to you with their   recommendations at their soonest availability.  Thank you,  PROVATION

## 2024-07-23 NOTE — H&P
Short Stay Endoscopy History and Physical    PCP - Donnie Au MD  Referring Physician - PRE-ADMIT NURSE 1, ENDO -[facility]  No address on file    Procedure - Colonoscopy  ASA - per anesthesia  Mallampati - per anesthesia  History of Anesthesia problems - no  Family history Anesthesia problems -  no   Plan of anesthesia - General    HPI  58 y.o. female  Reason for procedure:   Encounter for screening for malignant neoplasm of colon [Z12.11]        ROS:  Constitutional: No fevers, chills, No weight loss  CV: No chest pain  Pulm: No cough, No shortness of breath  GI: see HPI    Medical History:  has a past medical history of Back pain (2024), Glaucoma, Hyperlipidemia, Hypertension, and Prediabetes.    Surgical History:  has a past surgical history that includes Cholecystectomy; Hysterectomy; and  section ().    Family History: family history includes Brain aneurysm in her mother; Diabetes in her mother; Glaucoma in her mother; Heart disease in her father; Hypertension in her mother..    Social History:  reports that she has never smoked. She has never used smokeless tobacco. She reports current alcohol use of about 2.0 standard drinks of alcohol per week. She reports that she does not currently use drugs.    Review of patient's allergies indicates:  No Known Allergies    Medications:   Medications Prior to Admission   Medication Sig Dispense Refill Last Dose    amLODIPine (NORVASC) 5 MG tablet Take 1 tablet (5 mg total) by mouth once daily. 90 tablet 3     atorvastatin (LIPITOR) 20 MG tablet TAKE 1 TABLET BY MOUTH EVERY DAY 90 tablet 3     hydroCHLOROthiazide (HYDRODIURIL) 12.5 MG Tab Take 1 tablet (12.5 mg total) by mouth once daily. 90 tablet 3     latanoprost 0.005 % ophthalmic solution        meloxicam (MOBIC) 15 MG tablet TAKE 1 TABLET BY MOUTH EVERY DAY 30 tablet 2     timolol maleate 0.5% (TIMOPTIC) 0.5 % Drop Place 1 drop into both eyes 2 (two) times daily.          Physical  Exam:    Vital Signs: There were no vitals filed for this visit.    General Appearance: Well appearing in no acute distress  Abdomen: Soft, non tender, non distended with normal bowel sounds, no masses    Labs:  Lab Results   Component Value Date    WBC 4.40 01/09/2024    HGB 13.3 01/09/2024    HCT 41.8 01/09/2024     01/09/2024    CHOL 263 (H) 01/09/2024    TRIG 70 01/09/2024    HDL 84 (H) 01/09/2024    ALT 23 01/09/2024    AST 21 01/09/2024     01/09/2024    K 3.7 01/09/2024     01/09/2024    CREATININE 0.8 01/09/2024    BUN 13 01/09/2024    CO2 26 01/09/2024    TSH 1.167 01/09/2024    HGBA1C 5.4 01/09/2024       I have explained the risks and benefits of this endoscopic procedure to the patient including but not limited to bleeding, inflammation, infection, perforation, and death.      Jarred Olivarez MD

## 2024-07-25 LAB
COMMENT: NORMAL
FINAL PATHOLOGIC DIAGNOSIS: NORMAL
GROSS: NORMAL
Lab: NORMAL
MICROSCOPIC EXAM: NORMAL

## 2024-10-21 DIAGNOSIS — M17.0 PRIMARY OSTEOARTHRITIS OF BOTH KNEES: Primary | ICD-10-CM

## 2024-10-25 ENCOUNTER — HOSPITAL ENCOUNTER (OUTPATIENT)
Dept: RADIOLOGY | Facility: HOSPITAL | Age: 59
Discharge: HOME | End: 2024-10-25
Attending: ORTHOPAEDIC SURGERY
Payer: OTHER GOVERNMENT

## 2024-10-25 ENCOUNTER — OFFICE VISIT (OUTPATIENT)
Dept: ORTHOPEDICS | Facility: CLINIC | Age: 59
End: 2024-10-25
Payer: OTHER GOVERNMENT

## 2024-10-25 VITALS — WEIGHT: 227.63 LBS | BODY MASS INDEX: 35.73 KG/M2 | HEIGHT: 67 IN

## 2024-10-25 DIAGNOSIS — M17.0 PRIMARY OSTEOARTHRITIS OF BOTH KNEES: Primary | ICD-10-CM

## 2024-10-25 DIAGNOSIS — M17.0 PRIMARY OSTEOARTHRITIS OF BOTH KNEES: ICD-10-CM

## 2024-10-25 PROCEDURE — 73564 X-RAY EXAM KNEE 4 OR MORE: CPT | Mod: 26,50,, | Performed by: RADIOLOGY

## 2024-10-25 PROCEDURE — 99213 OFFICE O/P EST LOW 20 MIN: CPT | Mod: S$GLB,,, | Performed by: ORTHOPAEDIC SURGERY

## 2024-10-25 PROCEDURE — 99999 PR PBB SHADOW E&M-EST. PATIENT-LVL III: CPT | Mod: PBBFAC,,, | Performed by: ORTHOPAEDIC SURGERY

## 2024-10-25 PROCEDURE — 73564 X-RAY EXAM KNEE 4 OR MORE: CPT | Mod: TC,50

## 2024-11-11 NOTE — PROGRESS NOTES
Subjective:     HPI:   Shiloh Thayer is a 59 y.o. female who presents ***    History of Present Illness      ***    Past surgical history: ***    Hx DVT: ***    Medications: ***    Injections:   3/6/2024 bilateral knee iaCSI with Malcolm Sanjiv      Physical Therapy: Yes, completed physical therapy 5/2023 to 7/2024    Bracing: ***    Assistive Devices: ***    Walking:   {WAMWALKDISTANCE:31819}    Limitations:  {WAMLIM:80955}      Occupation: ***    Social support: The patient stated that they live at home with {WAMSS:01718}. The patient stated that {WAMSS:44989} would be able to help take care of them if they were to have surgery.        ROS:  The updated medical history is in the chart and has been reviewed. A review of systems is updated and there is no reported vision changes, ear/nose/mouth/throat complaints,  chest pain, shortness of breath, abdominal pain, urological complaints, fevers or chills, psychiatric complaints. Musculoskeletal and neurologcial symptoms are as documented. All other systems are negative.      Objective:   Exam:  There were no vitals filed for this visit.  There is no height or weight on file to calculate BMI.    Physical examination included assessment of the patient's general appearance with particular attention to development, nutrition, body habitus, attention to grooming, and any evidence of distress.  Constitutional: The patient is a well-developed, well-nourished patient in no acute distress.   Cardiovascular: Vascular examination included warmth and capillary refill as well inspection for edema and assessment of pedal pulses. Pulses are palpable and regular.  Musculoskeletal: Gait was assessed as to whether it was steady, non-antalgic, and/or required the use of an assist device. The patient was also asked to walk independently and get onto the examination table.  Skin: The skin was examined for any obvious rashes or lesions in the extremity.  Neurologic: Sensation is intact to  light touch in the extremity. The patient has good coordination without hyperreflexia and is alert and oriented to person, place and time and has normal mood and affect.     All of the above were examined and found to be within normal limits except for the following pertinent clinical findings:    Physical Exam      ***      Imaging:    Results      {[facility]:58887}    ***      Assessment:     No diagnosis found.     ***    Plan:     Assessment & Plan      ***    No orders of the defined types were placed in this encounter.      This note was generated with the assistance of ambient listening technology. Verbal consent was obtained by the patient and accompanying visitor(s) for the recording of patient appointment to facilitate this note. I attest to having reviewed and edited the generated note for accuracy, though some syntax or spelling errors may persist. Please contact the author of this note for any clarification.            Past Medical History:   Diagnosis Date    Back pain 2024    Glaucoma     Hyperlipidemia     Hypertension     Prediabetes     Sleep apnea        Past Surgical History:   Procedure Laterality Date     SECTION      CHOLECYSTECTOMY      COLONOSCOPY N/A 2024    Procedure: COLONOSCOPY;  Surgeon: Jarred Olivarez MD;  Location: [facility] ENDOSCOPY;  Service: Endoscopy;  Laterality: N/A;  24- Rx reordered, lvm and portal msg for pc. DBM  24- pt returned call, pc complete. DBM  24- r/s to later date, instr to portal. DBM   pt r/s- precall complete- suflave inst portal-RB    HYSTERECTOMY         Family History   Problem Relation Name Age of Onset    Diabetes Mother Lisbet Gomez     Hypertension Mother Lisbet Gomez     Glaucoma Mother Lisbet Gomez     Brain aneurysm Mother Lisbet Gomez     Heart disease Father           from MI age 50s       Social History     Socioeconomic History    Marital status:    Tobacco Use    Smoking status:  Never    Smokeless tobacco: Never   Substance and Sexual Activity    Alcohol use: Yes     Alcohol/week: 2.0 standard drinks of alcohol     Types: 2 Glasses of wine per week    Drug use: Not Currently    Sexual activity: Yes     Partners: Male     Birth control/protection: See Surgical Hx     Social Drivers of Health     Financial Resource Strain: Low Risk  (1/9/2024)    Overall Financial Resource Strain (CARDIA)     Difficulty of Paying Living Expenses: Not very hard   Food Insecurity: No Food Insecurity (1/9/2024)    Hunger Vital Sign     Worried About Running Out of Food in the Last Year: Never true     Ran Out of Food in the Last Year: Never true   Transportation Needs: No Transportation Needs (1/9/2024)    PRAPARE - Transportation     Lack of Transportation (Medical): No     Lack of Transportation (Non-Medical): No   Physical Activity: Insufficiently Active (1/9/2024)    Exercise Vital Sign     Days of Exercise per Week: 2 days     Minutes of Exercise per Session: 20 min   Stress: No Stress Concern Present (1/9/2024)    Citizen of [place] [employer] - Occupational Stress Questionnaire     Feeling of Stress : Not at all   Housing Stability: Low Risk  (1/9/2024)    Housing Stability Vital Sign     Unable to Pay for Housing in the Last Year: No     Number of Places Lived in the Last Year: 1     Unstable Housing in the Last Year: No

## 2024-11-12 NOTE — PROGRESS NOTES
Subjective:     HPI:   Shiloh Thayer is a 59 y.o. female who presents for eval B knee pain ref by Dr Arora    History of Present Illness    CHIEF COMPLAINT:  - Shiloh presents for initial evaluation of bilateral knee pain, with the left knee currently being more symptomatic.    HPI:  Shiloh presents for evaluation of bilateral knee pain, with the left knee currently causing more discomfort. The pain has been present for years and affects various activities. She reports difficulty with bending, bike riding, and occasionally standing. She expresses concern about walking long distances, fearing inability to return from destinations.    She localizes the pain, particularly in the left knee, to a specific area during the exam. She describes experiencing stiffness, which worsens with weather changes. Shiloh notes difficulty in movement after prolonged sitting.    Previous treatments have included anti-inflammatory medication, steroid injections, and physical therapy under the care of Dr. Alejandra. She denies using a compressive knee sleeve. X-rays reveal severe osteoarthritis in both knees, with the left knee showing bone-on-bone contact and the presence of bone spurs, which she confirms as painful.    The condition significantly impacts the patient's quality of life, limiting activities and causing concern about mobility. She denies any previous surgeries on the knees.    She denies any numbness or tingling in the lower extremities. Shiloh denies any history of major heart, lung, liver, or kidney problems, heart attacks, stents in the heart, diabetes, or blood clots in the veins of the legs or lungs.    SURGICAL HISTORY:  - No previous surgeries on knees      ROS:  Musculoskeletal: +joint pain, +joint stiffness  Neurological: -numbness, -tingling         TODAY:   L>R knee  X years  Global, lateral    Past surgical history: None    Hx DVT: None.     Medications: Meloxicam (15m, daily, medication didn't help and  stopped due to the prescription running out).     Injections:   3/6/2024 bilateral knee iaCSI with Malcolm Kincaid, significant relief for several months     Physical Therapy: Yes, completed physical therapy 2023 to 2024, the patient reported that th physical therapy helped with her pain and symptoms     Bracing: None    Assistive Devices: None.    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 The patient stated that their  would be able to help take care of them if they were to have surgery.        ROS:  The updated medical history is in the chart and has been reviewed. A review of systems is updated and there is no reported vision changes, ear/nose/mouth/throat complaints,  chest pain, shortness of breath, abdominal pain, urological complaints, fevers or chills, psychiatric complaints. Musculoskeletal and neurologcial symptoms are as documented. All other systems are negative.      Objective:   Exam:  There were no vitals filed for this visit.  Body mass index is 35.67 kg/m².    Physical examination included assessment of the patient's general appearance with particular attention to development, nutrition, body habitus, attention to grooming, and any evidence of distress.  Constitutional: The patient is a well-developed, well-nourished patient in no acute distress.   Cardiovascular: Vascular examination included warmth and capillary refill as well inspection for edema and assessment of pedal pulses. Pulses are palpable and regular.  Musculoskeletal: Gait was assessed as to whether it was steady, non-antalgic, and/or required the use of an assist device. The patient was also asked to walk independently and get onto the examination  "table.  Skin: The skin was examined for any obvious rashes or lesions in the extremity.  Neurologic: Sensation is intact to light touch in the extremity. The patient has good coordination without hyperreflexia and is alert and oriented to person, place and time and has normal mood and affect.     All of the above were examined and found to be within normal limits except for the following pertinent clinical findings:    Physical Exam    Musculoskeletal: Slight lipid intagigate, left more than right knee. No groin pain with straight leg raise. No pain with passive range of motion of hip joint. Knee range of motion: 10 to 107 degrees. 0 degrees alignment, which does correct. Tender palpation predominantly at lateral joint line. Moderate effusion. Knee stable to anterior posterior varus and valgus thrusts. Significant flexion contracture. No extensor lag.  Neurological: Neurologically intact distally with good strength, sensation, and pulses.  IMAGING:  - X-rays both knees: No joint space visible in left knee indicating bone-on-bone contact, significant wear in both knees described as "worn out, bone on bone", large bone spurs noted particularly on the left knee, no cartilage visible in the joint space         L: , 0 +britany        Imaging:      KNEE R ARTHRITIS    Indication:  Right knee pain  Exam Ordered: Radiographs of the right knee include a standing anteroposterior view, a standing posterioanterior view, a lateral view in full flexion, and a sunrise view  Details of Examination: Exam shows evidence of joint space narrowing, osteophyte formation, and subchondral sclerosis, all consistent with degenerative arthritis of the knee.  No other significant findings are noted.  Impression:  Degenerative Arthritis, Right Knee and KNEE L ARTHRITIS     Indication:  Left knee pain  Exam Ordered: Radiographs of the left knee include a standing anteroposterior view, a standing posterioanterior view, a lateral view in full " flexion, and a sunrise view  Details of Examination: Exam shows evidence of joint space narrowing, osteophyte formation, and subchondral sclerosis, all consistent with degenerative arthritis of the knee.  No other significant findings are noted.  Impression:  Degenerative Arthritis, Left Knee    B knee Klg4 varus , severe bone on bone      Assessment:       ICD-10-CM ICD-9-CM   1. Primary osteoarthritis of left knee  M17.12 715.16   2. Primary osteoarthritis of both knees  M17.0 715.16        cLBP  JACKIE      Plan:     Assessment & Plan    M17.0 Bilateral primary osteoarthritis of knee  M25.661 Stiffness of right knee, not elsewhere classified  M25.662 Stiffness of left knee, not elsewhere classified  M25.461 Effusion, right knee  M25.462 Effusion, left knee  M24.561 Contracture, right knee  M24.562 Contracture, left knee    LIFESTYLE:  - Recommend wearing a compressive knee sleeve for support.  - Advised using a cane, walker, or crutches to take force off the knees.  - Suggested working on weight loss and being more active.    MEDICATIONS PRESCRIBED:  - Prescribed 81mg aspirin twice daily for 30 days after surgery.    IMAGING ORDERS:  - Ordered special X-rays for computer software program to plan out the surgery.    RETURN TO ACTIVITY:  - Return to work in approximately 6 weeks after surgery.  - Resume driving for left leg surgery in about 2-3 weeks, when safe to do so.    FOLLOW UP:  - Follow up at 2 weeks, 6 weeks, and 12 weeks after surgery.         The above findings were discussed with patient length. We discussed the risks of conservative versus surgical management knee arthritis. Conservative management consisting of anti-inflammatory medications, glucosamine/chondroitin sulfate, weight loss, physical therapy, activity modification, as well as injections (lubricant versus corticosteroid) was discussed at length. At this point considering the patient's level of activity, pain, and radiographic findings I  recommend TKA    This patient has significant symptoms in their knee that are affecting their quality of life and daily activities.  They have tried non-operative treatment including analgesics, an exercise program, and activity modification, but the symptoms have persisted. I believe they make a good candidate for knee arthroplasty.     The risks and benefits of knee arthroplasty have been discussed with the patient which include, but are not limited to infections (including severe sequelae), potential component failure, fracture, DVT, pulmonary embolus, nerve palsy, poor range of motion, the possibility of bone grafting, persistent pain, wound healing complications, transfusions, medical complications and death.     We discussed surgical options including implant type and why I believe the implant selected is a good match for the patient's needs. The patient expressed understanding and agrees to proceed with the planned surgery.     Pre-operative planning will include the following:  A pre-surgical evaluation by will be arranged.  Pre-op orders will be placed.  We will make arrangements with the operating room for proper time and staffing.  We will make Social Service arrangements for the patient.    Implants:   Company: [employer]  System: Attune    Velys (not available at [facility])  press-fit    DVT prophylaxis: ASA 81mg BID x1 month    Dispo: outpatient PT    Admission status: Same Day Discharge    Location: [facility]  TKA + velys info           No orders of the defined types were placed in this encounter.      This note was generated with the assistance of ambient listening technology. Verbal consent was obtained by the patient and accompanying visitor(s) for the recording of patient appointment to facilitate this note. I attest to having reviewed and edited the generated note for accuracy, though some syntax or spelling errors may persist. Please contact the author of this note for any  clarification.            Past Medical History:   Diagnosis Date    Back pain 2024    Glaucoma     Hyperlipidemia     Hypertension     Prediabetes     Sleep apnea        Past Surgical History:   Procedure Laterality Date     SECTION      CHOLECYSTECTOMY      COLONOSCOPY N/A 2024    Procedure: COLONOSCOPY;  Surgeon: Jarred Olivarez MD;  Location: [facility] ENDOSCOPY;  Service: Endoscopy;  Laterality: N/A;  24- Rx reordered, lvm and portal msg for pc. DB  24- pt returned call, pc complete. DBM  24- r/s to later date, instr to portal. DB   pt r/s- precall complete- suflave inst portal-RB    HYSTERECTOMY         Family History   Problem Relation Name Age of Onset    Diabetes Mother Lisbet Gomez     Hypertension Mother Lisbet Gomez     Glaucoma Mother Lisbet Gomez     Brain aneurysm Mother Lisbet Gomez     Heart disease Father           from MI age 50s       Social History     Socioeconomic History    Marital status:    Tobacco Use    Smoking status: Never    Smokeless tobacco: Never   Substance and Sexual Activity    Alcohol use: Yes     Alcohol/week: 2.0 standard drinks of alcohol     Types: 2 Glasses of wine per week    Drug use: Not Currently    Sexual activity: Yes     Partners: Male     Birth control/protection: See Surgical Hx     Social Drivers of Health     Financial Resource Strain: Low Risk  (2024)    Overall Financial Resource Strain (CARDIA)     Difficulty of Paying Living Expenses: Not very hard   Food Insecurity: No Food Insecurity (2024)    Hunger Vital Sign     Worried About Running Out of Food in the Last Year: Never true     Ran Out of Food in the Last Year: Never true   Transportation Needs: No Transportation Needs (2024)    PRAPARE - Transportation     Lack of Transportation (Medical): No     Lack of Transportation (Non-Medical): No   Physical Activity: Insufficiently Active (2024)    Exercise Vital Sign     Days  of Exercise per Week: 2 days     Minutes of Exercise per Session: 20 min   Stress: No Stress Concern Present (1/9/2024)    [employer] - Occupational Stress Questionnaire     Feeling of Stress : Not at all   Housing Stability: Low Risk  (1/9/2024)    Housing Stability Vital Sign     Unable to Pay for Housing in the Last Year: No     Number of Places Lived in the Last Year: 1     Unstable Housing in the Last Year: No

## 2024-11-14 ENCOUNTER — OFFICE VISIT (OUTPATIENT)
Dept: ORTHOPEDICS | Facility: CLINIC | Age: 59
End: 2024-11-14
Payer: OTHER GOVERNMENT

## 2024-11-14 VITALS — BODY MASS INDEX: 35.75 KG/M2 | HEIGHT: 67 IN | WEIGHT: 227.75 LBS

## 2024-11-14 DIAGNOSIS — M17.12 PRIMARY OSTEOARTHRITIS OF LEFT KNEE: Primary | ICD-10-CM

## 2024-11-14 DIAGNOSIS — M17.0 PRIMARY OSTEOARTHRITIS OF BOTH KNEES: ICD-10-CM

## 2024-11-14 PROCEDURE — 99999 PR PBB SHADOW E&M-EST. PATIENT-LVL III: CPT | Mod: PBBFAC,,, | Performed by: ORTHOPAEDIC SURGERY

## 2024-11-14 PROCEDURE — 99215 OFFICE O/P EST HI 40 MIN: CPT | Mod: S$GLB,,, | Performed by: ORTHOPAEDIC SURGERY

## 2024-11-26 DIAGNOSIS — M17.0 PRIMARY OSTEOARTHRITIS OF BOTH KNEES: Primary | ICD-10-CM

## 2024-11-26 RX ORDER — MELOXICAM 15 MG/1
15 TABLET ORAL DAILY
Qty: 90 TABLET | Refills: 2 | Status: SHIPPED | OUTPATIENT
Start: 2024-11-26

## 2024-11-26 RX ORDER — MELOXICAM 15 MG/1
15 TABLET ORAL DAILY
Qty: 30 TABLET | Refills: 2 | Status: SHIPPED | OUTPATIENT
Start: 2024-11-26

## 2024-12-01 DIAGNOSIS — I10 PRIMARY HYPERTENSION: ICD-10-CM

## 2024-12-01 NOTE — TELEPHONE ENCOUNTER
Care Due:                  Date            Visit Type   Department     Provider  --------------------------------------------------------------------------------                                NP -                              PRIMARY      [facility]  Last Visit: 01-      CARE ([facility])   MEDICINE       Donnie Au  Next Visit: None Scheduled  None         None Found                                                            Last  Test          Frequency    Reason                     Performed    Due Date  --------------------------------------------------------------------------------    CMP.........  12 months..  atorvastatin,              01- 01-                             hydroCHLOROthiazide......    Lipid Panel.  12 months..  atorvastatin.............  01- 01-    [facility] Embedded Care Due Messages. Reference number: [id number].   12/01/2024 1:03:21 PM CST

## 2024-12-02 RX ORDER — AMLODIPINE BESYLATE 5 MG/1
5 TABLET ORAL DAILY
Qty: 90 TABLET | Refills: 0 | Status: SHIPPED | OUTPATIENT
Start: 2024-12-02 | End: 2025-03-02

## 2024-12-02 RX ORDER — HYDROCHLOROTHIAZIDE 12.5 MG/1
12.5 TABLET ORAL DAILY
Qty: 90 TABLET | Refills: 0 | Status: SHIPPED | OUTPATIENT
Start: 2024-12-02 | End: 2025-03-02

## 2024-12-02 NOTE — TELEPHONE ENCOUNTER
Provider Staff:  Action required for this patient     Please see care gap opportunities below in Care Due Message.    Thanks!  [facility]     Appointments      Date Provider   Last Visit   1/9/2024 Donnie Au MD   Next Visit   Visit date not found Donnie Au MD      Refill Decision Note   Shiloh Thayer  is requesting a refill authorization.  Brief Assessment and Rationale for Refill:  Approve     Medication Therapy Plan:         Pharmacist review requested: Yes   Extended chart review required: Yes   Comments:     Note composed:5:48 PM 12/02/2024

## 2024-12-02 NOTE — TELEPHONE ENCOUNTER
Refill Routing Note   Medication(s) are not appropriate for processing by [facility] for the following reason(s):        Drug-disease interaction: hydroCHLOROthiazide and Other specified glaucoma     [facility] action(s):  Approve  Defer     Requires labs : Yes           Pharmacist review requested: Yes     Appointments  past 12m or future 3m with PCP    Date Provider   Last Visit   1/9/2024 Donnie Au MD   Next Visit   Visit date not found Donnie Au MD   ED visits in past 90 days: 0        Note composed:4:14 PM 12/02/2024

## 2024-12-06 ENCOUNTER — IMMUNIZATION (OUTPATIENT)
Dept: INTERNAL MEDICINE | Facility: CLINIC | Age: 59
End: 2024-12-06
Payer: OTHER GOVERNMENT

## 2024-12-06 DIAGNOSIS — Z23 NEED FOR VACCINATION: Primary | ICD-10-CM

## 2024-12-31 ENCOUNTER — ON-DEMAND VIRTUAL (OUTPATIENT)
Dept: URGENT CARE | Facility: CLINIC | Age: 59
End: 2024-12-31
Payer: OTHER GOVERNMENT

## 2024-12-31 DIAGNOSIS — J06.9 URI WITH COUGH AND CONGESTION: Primary | ICD-10-CM

## 2024-12-31 PROCEDURE — 99213 OFFICE O/P EST LOW 20 MIN: CPT | Mod: 95,,, | Performed by: NURSE PRACTITIONER

## 2024-12-31 RX ORDER — AZELASTINE 1 MG/ML
1 SPRAY, METERED NASAL 2 TIMES DAILY
Qty: 30 ML | Refills: 0 | Status: SHIPPED | OUTPATIENT
Start: 2024-12-31

## 2024-12-31 RX ORDER — PREDNISONE 20 MG/1
20 TABLET ORAL DAILY
Qty: 3 TABLET | Refills: 0 | Status: SHIPPED | OUTPATIENT
Start: 2024-12-31 | End: 2025-01-03

## 2024-12-31 RX ORDER — PROMETHAZINE HYDROCHLORIDE AND DEXTROMETHORPHAN HYDROBROMIDE 6.25; 15 MG/5ML; MG/5ML
5 SYRUP ORAL EVERY 4 HOURS PRN
Qty: 118 ML | Refills: 0 | Status: SHIPPED | OUTPATIENT
Start: 2024-12-31 | End: 2025-01-10

## 2024-12-31 NOTE — PROGRESS NOTES
Subjective:      Patient ID: Shiloh Thayer is a 59 y.o. female.    Vitals:  vitals were not taken for this visit.     Chief Complaint: No chief complaint on file.      Visit Type: TELE AUDIOVISUAL - This visit was conducted virtually based on  subjective information and limited objective exam    Present with the patient at the time of consultation: TELEMED PRESENT WITH PATIENT: None  LOCATION OF PATIENT [place]  Two patient identifiers used to verify patient- saying out date of birth and full name.       Past Medical History:   Diagnosis Date    Back pain 2024    Glaucoma     Hyperlipidemia     Hypertension     Prediabetes     Sleep apnea      Past Surgical History:   Procedure Laterality Date     SECTION      CHOLECYSTECTOMY      COLONOSCOPY N/A 2024    Procedure: COLONOSCOPY;  Surgeon: Jarred Olivarez MD;  Location: [facility] ENDOSCOPY;  Service: Endoscopy;  Laterality: N/A;  24- Rx reordered, lvm and portal msg for pc. DB  24- pt returned call, pc complete. DBM  24- r/s to later date, instr to portal. DBM   pt r/s- precall complete- suflave inst portal-RB    HYSTERECTOMY       Review of patient's allergies indicates:  No Known Allergies  Current Outpatient Medications on File Prior to Visit   Medication Sig Dispense Refill    amLODIPine (NORVASC) 5 MG tablet Take 1 tablet (5 mg total) by mouth once daily. 90 tablet 0    atorvastatin (LIPITOR) 20 MG tablet TAKE 1 TABLET BY MOUTH EVERY DAY 90 tablet 3    hydroCHLOROthiazide (HYDRODIURIL) 12.5 MG Tab Take 1 tablet (12.5 mg total) by mouth once daily. 90 tablet 0    latanoprost 0.005 % ophthalmic solution       meloxicam (MOBIC) 15 MG tablet Take 1 tablet (15 mg total) by mouth once daily. 30 tablet 2    meloxicam (MOBIC) 15 MG tablet Take 1 tablet (15 mg total) by mouth once daily. 90 tablet 2    timolol maleate 0.5% (TIMOPTIC) 0.5 % Drop Place 1 drop into both eyes 2 (two) times daily.       No current  facility-administered medications on file prior to visit.     Family History   Problem Relation Name Age of Onset    Diabetes Mother Lisbet Gomez     Hypertension Mother Lisbet Gomez     Glaucoma Mother Lisbet Gomez     Brain aneurysm Mother Lisbet Gomez     Heart disease Father           from MI age 50s           Ohs Peq Odvv Intake    2024  9:06 AM CST - Filed by Patient   What is your current physical address in the event of a medical emergency? [facility]   Are you able to take your vital signs? No   Please attach any relevant images or files    Is your employer contracted with [facility]? No         60 yo female with c/o cough and congestion for one week. She states negative home covid test. She states she has tried nyquil. She states she had sore throat which resolved. She denies fever. Denies sob and wheezing.         Constitution: Negative.   HENT:  Positive for congestion and sore throat.    Cardiovascular: Negative.    Respiratory:  Positive for cough and sputum production. Negative for shortness of breath and wheezing.    Gastrointestinal: Negative.    Endocrine: negative.   Genitourinary: Negative.  Negative for frequency and urgency.   Musculoskeletal: Negative.    Skin: Negative.    Allergic/Immunologic: Negative.    Neurological: Negative.    Hematologic/Lymphatic: Negative.    Psychiatric/Behavioral: Negative.          Objective:   The physical exam was conducted virtually.    AAO x 3 ; no acute distress noted; appearance normal; mood and behavior normal; thought process normal  Head- normocephalic  Nose- appears normal, no discharge or erythema  Eyes- pupils appear normal in size, no drainage, no erythema  Ears- normal appearing; no discharge, no erythema  Mouth- appears normal  Oropharynx- no erythema, lesions  Lungs- breathing at a normal rate, no acute distress noted  Heart- no reports of tachycardia, palpitations, chest pain  Abdomen- non  distended, non tender reported by patient  Skin- warm and dry, no erythema or edema noted by patient or visualized  Psych- as above; no si/hi      Assessment:     No diagnosis found.    Plan:     Patient Instructions   - Stay hydrated, drink plenty of water, and REST.  - OTC guaifenesin (plain Mucinex) for thick nasal/sinus congestion.    - OTC Robitussin DM or Mucinex DM for congestion with cough (guaifenesin + dextromethorphan).  - OTC Flonase or Nasonex for sinus congestion.   - OTC Simply Saline (e.g. Arm & Hammer) for irritated and raw nasal passages.  - OTC Vicks VapoCOOL spray and Cepacol throat lozenges for sore throat.  - OTC ibuprofen or acetaminophen alternating every 4-6 hours as needed for aches/pains/high fever.  - OTC Airborne or Emergen-C have ingredients like Zinc, Echinacea, and vitamin-C to help boost the immune system. Elderberry is also good for this.      Symptoms of upper respiratory infection (URI) or any acute rhinosinusitis can be treated with the following medications available over the counter. Take these according to the package instructions with the permission of your primary care provider and/or specialist(s).      Many products contain multiple medications in one, so be sure to check the ingredient list for any over the counter medication to be sure you are not taking the same medication in multiple ways.      Aches, pain, and fever:   acetaminophen (Tylenol)  NSAIDs (Motrin, Advil)* (avoid these in kidney disease)     Congestion:   Netti Pot  Guaifenesin (Mucinex, Robitussin)  Phenylephrine  Pseudoephedrine (Sudafed)  Nasal steroid spray (Nasacort, Flonase, Rhinocort)  Nasal saline     Cough/mucus expectorant:  Guaifenesin (Robitussin)  Menthol ointment (Vicks) (topically)     Cough suppressant:  Dextromethoraphan (Delsym)     Coughing is a normal body mechanism for removing secretions from lungs.   We suggest you avoid cough suppressants unless your cough is continuous or causing a  "disruption in your every day activities or sleep/rest.   If you were given a prescription for a cough suppressant, take it at night or when you are at home because it will make you drowsy.     Sore throat:  Cough drops  Throat spray  Salt water gargles  Warm water, honey, and a capful of Apple Cider vinegar gargles     Adults may take 2 teaspoons of honey nightly to help with a cough. This can be mixed with a mug of warm water and the juice of half of a lemon.    Children over the age of 5 years can be given 1 teaspoon nightly for cough. Honey should never be given to children less than 1 year old.       Runny nose/sneezing/allergies:  Antihistamine  Nasal steroid  Nasal saline reduces inflammation and dryness.     Warm face compresses help with facial sinus pain/pressure.     If you DO NOT have hypertension (high blood pressure) or any history of palpitations, it is okay to take over-the-counter Sudafed, Mucinex-D, Allegra-D, Claritin-D, Zyrtec-D. These can be found be asking the pharmacy staff because they are kept behind the counter.      If you DO have hypertension (high blood pressure) or palpitations, only take medications with a red heart on the box indicating it is "heart" safe. For example, it is safe to take Coricidin HBP for relief of sinus symptoms. Vicks NyQuil High Blood Pressure Cold& Flu, Micheal's Dayquil HBP, Mucinex, Benadryl, Zyrtec, Claritin, or Allegra are all safe for patient's with high blood pressure.     Be sure to wash your hands often. Do not share drinks. Attempt to cough into the curve of your elbow or into your hands.      To minimize the chance of re-infection, change your toothbrush after 3-4 days on antibiotics.  Alternatively, buy a multi-pack of toothbrushes (they can be found inexpensively at the dollar store) and use one per day, discarding it at the end of the day and then start with a new "regular" toothbrush after that.     Please take all of your antibiotics if you were " prescribed them. If for any reason you do not complete the course of antibiotics, please throw the remainder away. It's very important to complete your entire course of medication treatment. Often patients will discontinue antibiotics after just a few days when they begin to feel better. This OFTEN leads to a return of your illness at a later time which is then more difficult to treat.      Antibiotics may cause diarrhea. Please make sure you take your antibiotics with food. If the diarrhea becomes profuse, you may try over the counter Immodium AD or Pepto Bismol to help slow down the diarrhea.  Pepto Bismol can turn your stool black if taken for several doses.      Taking an over-the-counter probiotic while you are taking the antibiotics can help to reduce GI upset as well as reduce the chances for developing a yeast infection.  Be sure to take the probiotic at a different time from the antibiotic, for example, if you take the antibiotic in the morning and at night then take your probiotic at lunch.  Be sure to eat with your antibiotic to help reduce nausea with taking it.     If your condition worsens or fails to improve, we recommend that you receive another evaluation at the emergency room immediately or contact your primary medical clinic to discuss your concern.     Thank you for choosing [facility] today.     PLEASE ONLY TAKE MEDICATIONS APPROVED BY YOUR PRIMARY CARE PROVIDER AND SPECIALISTS.     Please understand that you've received Virtual treatment only and that you may be released before all your medical problems are known or treated. You, the patient, will arrange for follow up care as instructed. Follow up with your PCP/specialty clinic as directed in the next 1-2 weeks if not improved or as needed. If your condition significantly worsens, we recommend that you receive another evaluation at the emergency room.  .                  Thank you for choosing [facility]!    Our  goal in the [facility] is to always provide outstanding medical care. You may receive a survey by mail or e-mail in the next week regarding your experience today. We would greatly appreciate you completing and returning the survey. Your feedback provides us with a way to recognize our staff who provide very good care, and it helps us learn how to improve when your experience was below our aspiration of excellence.         We appreciate you trusting us with your medical care. We hope you feel better soon. We will be happy to take care of you for all of your future medical needs.    You must understand that you've received an Urgent Care treatment only and that you may be released before all your medical problems are known or treated. You, the patient, will arrange for follow up care as instructed.    Follow up with your PCP or specialty clinic as directed in the next 1-2 weeks if not improved or as needed.  You can call [phone] to schedule an appointment with the appropriate provider.    If your condition worsens we recommend that you receive another evaluation in person, with your primary care provider, urgent care or at the emergency room immediately or contact your primary medical clinics after hours call service to discuss your concerns.         There are no diagnoses linked to this encounter.

## 2025-01-16 DIAGNOSIS — I10 HYPERTENSION: ICD-10-CM

## 2025-01-31 ENCOUNTER — CLINICAL SUPPORT (OUTPATIENT)
Dept: REHABILITATION | Facility: HOSPITAL | Age: 60
End: 2025-01-31
Attending: ORTHOPAEDIC SURGERY
Payer: OTHER GOVERNMENT

## 2025-01-31 DIAGNOSIS — M25.562 CHRONIC PAIN OF LEFT KNEE: Primary | ICD-10-CM

## 2025-01-31 DIAGNOSIS — G89.29 CHRONIC PAIN OF LEFT KNEE: Primary | ICD-10-CM

## 2025-02-03 NOTE — PROGRESS NOTES
"  Outpatient Rehab    Physical Therapy Evaluation    Patient Name: Shiloh Thayer  MRN: [id number]  YOB: 1965  Today's Date: 2/3/2025    Therapy Diagnosis:   Encounter Diagnosis   Name Primary?    Chronic pain of left knee Yes     Physician: Dewayne Vargas III, *    Physician Orders: Eval and Treat  Medical Diagnosis:  Primary osteoarthritis of left knee [M17.12]     Visit # / Visits Authorized:     Date of Evaluation:  2025   Insurance Authorization Period: 2025 to 2025  Plan of Care Certification:  2025 to 2025      Time In: 1008   Time Out: 1100  Total Time: 52 minutes  Total Billable Time: 52 minutes         Subjective   History of Present Illness  Shiloh is a 59 y.o. female who reports to physical therapy with a chief concern of left knee pain. According to the patient's chart, Shiloh has a past medical history of Back pain, Glaucoma, Hyperlipidemia, Hypertension, Prediabetes, and Sleep apnea. Shiloh has a past surgical history that includes Cholecystectomy; Hysterectomy;  section (); and Colonoscopy (N/A, 2024).    The patient reports a medical diagnosis of Primary osteoarthritis of left knee (M17.12).            History of Present Condition/Illness: Shiloh reports a history of chronic left knee pain. She has elected for left total knee replacement. She reports that she is unsure if her pain is "that bad," but she also describes several functional limitations. She reports that ascending/descending stairs, sit/stand transfers, and prolonged sitting/standing is painful in left knee. She will be attending the total joint class in near future and is scheduled for left total knee arthroplasty on 3/10/2025.    Pain     Patient reports a current pain level of 7/10. Pain at best is reported as 7/10. Pain at worst is reported as 9/10.   Location: left knee  Clinical Progression (since onset): Stable  Pain Qualities: Aching           Past Medical " History/Physical Systems Review:   Shiloh Thayer  has a past medical history of Back pain, Glaucoma, Hyperlipidemia, Hypertension, Prediabetes, and Sleep apnea.    Shiloh Thayer  has a past surgical history that includes Cholecystectomy; Hysterectomy;  section (); and Colonoscopy (N/A, 2024).    Shiloh has a current medication list which includes the following prescription(s): amlodipine, atorvastatin, azelastine, hydrochlorothiazide, latanoprost, meloxicam, meloxicam, and timolol maleate 0.5%.    Review of patient's allergies indicates:  No Known Allergies     Objective       Hip Range of Motion    Within Functional Limits bilaterally    Knee Range of Motion   Right Knee   Active (deg) Passive (deg) Pain   Flexion 115       Extension 0           Left Knee   Active (deg) Passive (deg) Pain   Flexion 95       Extension 3                Ankle/Foot Range of Motion      Within Functional Limits bilaterally              Hip Strength - Planes of Motion   Right Strength Right Pain Left Strength Left  Pain   Flexion (L2) 4+   4+     Extension           ABduction           ADduction           Internal Rotation 5   4     External Rotation 5   4         Knee Strength   Right Strength Right Pain Left Strength Left  Pain   Flexion (S2) 5   4     Prone Flexion           Extension (L3) 5   4            Ankle/Foot Strength - Planes of Motion   Right Strength Right Pain Left Strength Left  Pain   Dorsiflexion (L4) 5   4+     Plantar Flexion (S1) 5   4+     Inversion           Eversion           Great Toe Flexion           Great Toe Extension (L5)           Lesser Toes Flexion           Lesser Toes Extension                     Ambulation Assistance Required  Surface With  Assistive Device Without Assistive Device Details   Level   Independent      Uneven   Independent     Curb                Intake Outcome Measure for FOTO Survey    Therapist reviewed FOTO scores for Shiloh Thayer on 2025.   FOTO report  - see Media section or FOTO account episode details.     Intake Score: 39.6%    Treatment:  Balance/Neuromuscular Re-Education  Balance/Neuromuscular Re-Education Activity 1: heel prop: 3 minutes  Balance/Neuromuscular Re-Education Activity 2: quad sets: x10  Balance/Neuromuscular Re-Education Activity 3: heel slides for gait mechanics: x10  Balance/Neuromuscular Re-Education Activity 4: education on range of motion expectations    Therapeutic Activity  Therapeutic Activity 1: Sit to stand transfer: x2 reps  Therapeutic Activity 2: Ambulating with rolling walker: 10 feet  Therapeutic Activity 3: education on proper use of rolling walker, icing/elevating    Patient's spiritual, cultural, and educational needs considered and patient agreeable to plan of care and goals.     Assessment & Plan   Assessment  Shiloh            Functional Limitations: Activity tolerance, Functional mobility, Gait limitations, Transfers, Standing tolerance, Range of motion  Impairments: Activity intolerance, Impaired physical strength    Patient Goal for Therapy (PT): to prepare for upcoming TKA  Prognosis: Good  Assessment Details: Patient presents to initial evaluation for prehabiltation in anticipation of left total knee arthroplasty scheduled for 3/10/2025. Patient presents with chronic left knee pain, decreased strength, range of motion and poor quadriceps motor control. Patient is functionally limited in prolonged standing, walking, transfers, stairs, and activities of daily living. Patient would benefit from skilled outpatient physical therapy to address these deficits prior to surgery and ensure a successful post operative recovery.      Plan  From a physical therapy perspective, the patient would benefit from: Skilled Rehab Services    Planned therapy interventions include: Therapeutic exercise, Therapeutic activities, Neuromuscular re-education, Manual therapy, and Gait training.    Planned modalities to include: Electrical  stimulation - passive/unattended, Thermotherapy (hot pack), and Cryotherapy (cold pack).        Visit Frequency: 1 times Per Week for 1 Weeks.       This plan was discussed with Patient and Family.   Discussion participants: Agreed Upon Plan of Care  Plan details: Patient to be seen post op for follow up evaluation.           Goals:   Resolved       Functional outcome       Patient will demonstrate independence in home program for support of progression (Met)       Start:  02/03/25    Expected End:  03/31/25    Resolved:  02/03/25             Rere Knight, PT, DPT

## 2025-02-05 RX ORDER — IBUPROFEN 200 MG
200 TABLET ORAL
Status: ON HOLD | COMMUNITY
End: 2025-03-10 | Stop reason: HOSPADM

## 2025-02-05 RX ORDER — MULTIVITAMIN
1 TABLET ORAL DAILY
COMMUNITY

## 2025-02-05 NOTE — ANESTHESIA PAT ROS NOTE
02/05/2025  Shiloh Thayer is a 59 y.o., female.      Pre-op Assessment          Review of Systems           Anesthesia Assessment: Preoperative EQUATION    Planned Procedure: Procedure(s) (LRB):  ARTHROPLASTY, KNEE, TOTAL, USING VELLiquidM COMPUTER-ASSISTED NAVIGATION: LEFT: DEPUY - ATTUNE: SAME DAY (Left)  Requested Anesthesia Type:Regional  Surgeon: Dewayne Vargas III, MD  Service: Orthopedics  Known or anticipated Date of Surgery:3/10/2025    Surgeon notes: reviewed    Electronic QUestionnaire Assessment completed via nurse interview with patient.        Triage considerations:     The patient has no apparent active cardiac condition (No unstable coronary Syndrome such as severe unstable angina or recent [<1 month] myocardial infarction, decompensated CHF, severe valvular   disease or significant arrhythmia)    Previous anesthesia records:MAC   Patient reports no personal or family history of anesthesia complications.   7/23/24  COLONOSCOPY (Abdomen)   Airway:  Mouth Opening: Normal  TM Distance: Normal  Tongue: Normal  Neck ROM: Normal ROM     [facility]  Subspecialty notes: Ortho  Pain Medicine     CLAUDINE Kincaid PA-C  Sleep Medicine   Dr. Townsend    Other important co-morbidities: HLD, HTN, Obesity, and JACKIE, OA, Glaucoma       Tests already available:  [facility] tests,  [facility]   01/09/24   CMP   CBC   Lipid panel   A1C   TSH   T4,Free   HIV 1/2 Ag/Ab   Hep C Ab            Instructions given. (See in Nurse's note)    Optimization:  Anesthesia Preop Clinic Assessment  Indicated   Will schedule POC.    Medical Opinion Indicated       Sub-specialist consult indicated:   [facility] NP.       Plan:    Testing:  CBC, CMP, EKG, and PT/INR   Pre-anesthesia  visit       Visit focus: possible regional anesthesia and/or nerve block      Consultation: . PCC  NP for medical and anesthesia optimization.     Patient  has previously scheduled Medical Appointment: 2/14    Navigation: Tests Scheduled.              Consults scheduled.             Results will be tracked by Preop Clinic.     2/14/25  Patient is optimized     I spent a total of 40 minutes on the day of the visit.This includes face to face time and non-face to face time preparing to see the patient (eg, review of tests), obtaining and/or reviewing separately obtained history, documenting clinical information in the electronic or other health record, independently interpreting results and communicating results to the patient/family/caregiver, or care coordinator.        Adria Lamb, NP  Perioperative Medicine  [facility]

## 2025-02-06 DIAGNOSIS — Z12.31 OTHER SCREENING MAMMOGRAM: ICD-10-CM

## 2025-02-11 PROBLEM — E66.811 OBESITY, CLASS I, BMI 30.0-34.9 (SEE ACTUAL BMI): Status: RESOLVED | Noted: 2019-07-16 | Resolved: 2025-02-11

## 2025-02-11 PROBLEM — E66.01 CLASS 2 SEVERE OBESITY DUE TO EXCESS CALORIES WITH SERIOUS COMORBIDITY AND BODY MASS INDEX (BMI) OF 37.0 TO 37.9 IN ADULT: Status: RESOLVED | Noted: 2024-02-06 | Resolved: 2025-02-11

## 2025-02-11 PROBLEM — E66.812 CLASS 2 SEVERE OBESITY DUE TO EXCESS CALORIES WITH SERIOUS COMORBIDITY AND BODY MASS INDEX (BMI) OF 37.0 TO 37.9 IN ADULT: Status: RESOLVED | Noted: 2024-02-06 | Resolved: 2025-02-11

## 2025-02-11 NOTE — ASSESSMENT & PLAN NOTE
Current /77   today.    Repeat 138/92 manual BP  Taking: Norvasc  Patient reports home BP readings of: 130s/70-80s      Lifestyle changes to reduce systolic BP:  exercise 30 minutes per day,  5 days per week or 150 minutes weekly; sodium reduction and avoidance of high salt foods such as processed meats, frozen meals and  fast foods.   Keeping a healthy weight/BMI can help with better BP control    BP acceptable for surgery. I recommend monitoring BP during perioperative period as uncontrolled pain can elevate blood pressure.

## 2025-02-11 NOTE — ASSESSMENT & PLAN NOTE
BMI 35.20    Patient would benefit from weight loss .   Lifestyle changes should be made by eating healthy, exercising at least 150 minutes weekly, and avoiding sedentary behavior.

## 2025-02-14 ENCOUNTER — OFFICE VISIT (OUTPATIENT)
Dept: ORTHOPEDICS | Facility: CLINIC | Age: 60
End: 2025-02-14
Payer: OTHER GOVERNMENT

## 2025-02-14 ENCOUNTER — HOSPITAL ENCOUNTER (OUTPATIENT)
Dept: CARDIOLOGY | Facility: CLINIC | Age: 60
Discharge: HOME | End: 2025-02-14
Payer: OTHER GOVERNMENT

## 2025-02-14 ENCOUNTER — HOSPITAL ENCOUNTER (OUTPATIENT)
Dept: RADIOLOGY | Facility: HOSPITAL | Age: 60
Discharge: HOME | End: 2025-02-14
Attending: NURSE PRACTITIONER
Payer: OTHER GOVERNMENT

## 2025-02-14 ENCOUNTER — OFFICE VISIT (OUTPATIENT)
Dept: INTERNAL MEDICINE | Facility: CLINIC | Age: 60
End: 2025-02-14
Payer: OTHER GOVERNMENT

## 2025-02-14 VITALS
SYSTOLIC BLOOD PRESSURE: 138 MMHG | OXYGEN SATURATION: 97 % | HEART RATE: 62 BPM | TEMPERATURE: 98 F | HEIGHT: 68 IN | WEIGHT: 231.5 LBS | BODY MASS INDEX: 35.09 KG/M2 | DIASTOLIC BLOOD PRESSURE: 92 MMHG

## 2025-02-14 DIAGNOSIS — H40.89 OTHER GLAUCOMA OF BOTH EYES: ICD-10-CM

## 2025-02-14 DIAGNOSIS — M25.562 CHRONIC PAIN OF LEFT KNEE: ICD-10-CM

## 2025-02-14 DIAGNOSIS — E78.2 MIXED HYPERLIPIDEMIA: ICD-10-CM

## 2025-02-14 DIAGNOSIS — M17.12 PRIMARY OSTEOARTHRITIS OF LEFT KNEE: ICD-10-CM

## 2025-02-14 DIAGNOSIS — Z01.818 PRE-OP TESTING: ICD-10-CM

## 2025-02-14 DIAGNOSIS — I10 PRIMARY HYPERTENSION: Primary | ICD-10-CM

## 2025-02-14 DIAGNOSIS — M17.0 PRIMARY OSTEOARTHRITIS OF BOTH KNEES: Primary | ICD-10-CM

## 2025-02-14 DIAGNOSIS — G89.29 CHRONIC PAIN OF LEFT KNEE: ICD-10-CM

## 2025-02-14 PROBLEM — G47.00 INSOMNIA: Status: RESOLVED | Noted: 2024-01-17 | Resolved: 2025-02-14

## 2025-02-14 LAB
OHS QRS DURATION: 74 MS
OHS QTC CALCULATION: 424 MS

## 2025-02-14 PROCEDURE — 99999 PR PBB SHADOW E&M-EST. PATIENT-LVL III: CPT | Mod: PBBFAC,,, | Performed by: NURSE PRACTITIONER

## 2025-02-14 PROCEDURE — 99999 PR PBB SHADOW E&M-EST. PATIENT-LVL IV: CPT | Mod: PBBFAC,,, | Performed by: NURSE PRACTITIONER

## 2025-02-14 PROCEDURE — 93005 ELECTROCARDIOGRAM TRACING: CPT | Mod: S$GLB,,, | Performed by: STUDENT IN AN ORGANIZED HEALTH CARE EDUCATION/TRAINING PROGRAM

## 2025-02-14 PROCEDURE — 73560 X-RAY EXAM OF KNEE 1 OR 2: CPT | Mod: 26,LT,, | Performed by: RADIOLOGY

## 2025-02-14 PROCEDURE — 93010 ELECTROCARDIOGRAM REPORT: CPT | Mod: S$GLB,,, | Performed by: INTERNAL MEDICINE

## 2025-02-14 PROCEDURE — 73560 X-RAY EXAM OF KNEE 1 OR 2: CPT | Mod: TC,LT

## 2025-02-14 NOTE — PROGRESS NOTES
[facility]  Progress Note    Patient Name: Shiloh Thayer  MRN: [id number]  Date of Evaluation- 2025  PCP- Donnie Au MD    Future cases for Shiloh Thayer [[id number]]       Case ID Status Date Time [place] Procedure Provider Location    [id number] [facility] 3/10/2025  7:00  ARTHROPLASTY, KNEE, TOTAL, USING [employer] COMPUTER-ASSISTED NAVIGATION: LEFT: DEPUY - ATTUNE: SAME DAY Dewayne Vargas III, MD [[id number]] [facility] OR            HPI:  This is a 59 y.o. female  who presents today for a preoperative evaluation in preparation for left knee replacement  Surgery is indicated for  osteoarthritis   .   Patient is new to me.    The history has been obtained by speaking with the patient and reviewing the electronic medical record and/or outside health information. Significant health conditions for the perioperative period are discussed below in assessment and plan.     Patient reports current health status to be Good.  Denies any new symptoms before surgery.       Subjective/ Objective:     Chief Complaint: Preoperative evaulation, perioperative medical management, and complication reduction plan.     Functional Capacity:  Able to climb a flight of stairs without CP SOB or Syncope.  Able to meet 4 METs      Anesthesia issues: None    Difficulty mouth opening: none    Steroid use in the last 12 months: none     Dental Issues: none    Family anesthesia difficulty: None     Family Hx of Thrombosis none    Past Medical History:   Diagnosis Date    Arthritis     Back pain 2024    Class 2 severe obesity due to excess calories with serious comorbidity and body mass index (BMI) of 37.0 to 37.9 in adult 2024    Glaucoma     Hyperlipidemia     Hypertension     Insomnia 2024    Obesity, Class I, BMI 30.0-34.9 (see actual BMI) 2019    Prediabetes     Sleep apnea      Past Surgical History:   Procedure Laterality Date     SECTION      CHOLECYSTECTOMY      COLONOSCOPY N/A 2024     "Procedure: COLONOSCOPY;  Surgeon: Jarred Olivarez MD;  Location: [facility] ENDOSCOPY;  Service: Endoscopy;  Laterality: N/A;  5/6/24- Rx reordered, lvm and portal msg for pc. [facility]  5/6/24- pt returned call, pc complete. [facility]  5/9/24- r/s to later date, instr to portal. M  7/18 pt r/s- precall complete- suflave inst portal-RB    HYSTERECTOMY         Review of Systems   Constitutional:  Negative for chills, fatigue and fever.   HENT:  Negative for trouble swallowing and voice change.    Eyes:  Negative for photophobia and visual disturbance.        No acute visual changes   Respiratory:  Negative for apnea, cough, chest tightness, shortness of breath and wheezing.         STOP bang  Score 4    High risk JACKIE   Cardiovascular:  Negative for chest pain, palpitations and leg swelling.   Gastrointestinal:  Negative for abdominal distention, abdominal pain, blood in stool, constipation, diarrhea, nausea and vomiting.        NO FLD, hepatitis, cirrhosis  No BRB or black tarry stool     Genitourinary:  Negative for difficulty urinating, dysuria, flank pain, frequency, hematuria and urgency.   Musculoskeletal:  Positive for arthralgias, gait problem and joint swelling. Negative for back pain, myalgias, neck pain and neck stiffness.   Neurological:  Negative for dizziness, tremors, seizures, syncope, weakness, light-headedness, numbness and headaches.   Psychiatric/Behavioral:  Negative for suicidal ideas.           VITALS  Visit Vitals  BP (!) 138/92   Pulse 62   Temp 98 °F (36.7 °C) (Oral)   Ht 5' 8" (1.727 m)   Wt 105 kg (231 lb 7.7 oz)   LMP  (LMP Unknown)   SpO2 97%   BMI 35.20 kg/m²          Physical Exam  Constitutional:       General: She is not in acute distress.     Appearance: Normal appearance. She is well-developed. She is not diaphoretic.   HENT:      Head: Normocephalic.      Right Ear: Hearing normal.      Left Ear: Hearing normal.      Nose: Nose normal.      Mouth/Throat:      Lips: Pink.      Mouth: Mucous " membranes are moist.      Pharynx: No oropharyngeal exudate.   Eyes:      General: Lids are normal.         Right eye: No discharge.         Left eye: No discharge.      Conjunctiva/sclera: Conjunctivae normal.      Pupils: Pupils are equal, round, and reactive to light.   Neck:      Thyroid: No thyromegaly.      Vascular: No carotid bruit or JVD.      Trachea: Trachea and phonation normal. No tracheal deviation.   Cardiovascular:      Rate and Rhythm: Normal rate and regular rhythm.      Pulses: Normal pulses.           Carotid pulses are 2+ on the right side and 2+ on the left side.       Radial pulses are 2+ on the right side and 2+ on the left side.        Posterior tibial pulses are 2+ on the right side and 2+ on the left side.      Heart sounds: Normal heart sounds. No murmur heard.     No friction rub. No gallop.   Pulmonary:      Effort: Pulmonary effort is normal. No respiratory distress.      Breath sounds: Normal breath sounds. No stridor. No wheezing or rales.      Comments: Clear Anterior and Posterior BBS  Abdominal:      General: Abdomen is protuberant. Bowel sounds are normal. There is no distension.      Palpations: Abdomen is soft.      Tenderness: There is no abdominal tenderness. There is no guarding.   Musculoskeletal:         General: No tenderness or deformity. Normal range of motion.      Cervical back: Normal range of motion and neck supple. No rigidity.      Right lower leg: No edema.      Left lower leg: No edema.   Lymphadenopathy:      Head:      Right side of head: No submental, submandibular, tonsillar, preauricular, posterior auricular or occipital adenopathy.      Left side of head: No submental, submandibular, tonsillar, preauricular, posterior auricular or occipital adenopathy.      Cervical: No cervical adenopathy.      Right cervical: No superficial cervical adenopathy.     Left cervical: No superficial cervical adenopathy.   Skin:     General: Skin is warm and dry.       Capillary Refill: Capillary refill takes 2 to 3 seconds.      Coloration: Skin is not pale.      Findings: No erythema or rash.   Neurological:      Mental Status: She is alert and oriented to person, place, and time. Mental status is at baseline.      GCS: GCS eye subscore is 4. GCS verbal subscore is 5. GCS motor subscore is 6.      Motor: No abnormal muscle tone.      Coordination: Coordination normal.   Psychiatric:         Attention and Perception: Attention and perception normal.         Mood and Affect: Mood and affect normal.         Speech: Speech normal.         Behavior: Behavior normal. Behavior is cooperative.         Thought Content: Thought content normal.         Cognition and Memory: Cognition and memory normal.         Judgment: Judgment normal.          Significant Labs:  Lab Results   Component Value Date    WBC 4.48 02/14/2025    HGB 12.5 02/14/2025    HCT 39.3 02/14/2025     02/14/2025    CHOL 263 (H) 01/09/2024    TRIG 70 01/09/2024    HDL 84 (H) 01/09/2024    ALT 11 02/14/2025    AST 19 02/14/2025     02/14/2025    K 4.1 02/14/2025     02/14/2025    CREATININE 0.7 02/14/2025    BUN 13 02/14/2025    CO2 28 02/14/2025    TSH 1.167 01/09/2024    INR 0.9 02/14/2025    HGBA1C 5.4 01/09/2024       Diagnostic Studies: No relevant studies.    EKG:   Results for orders placed or performed during the hospital encounter of 02/14/25   EKG 12-lead    Collection Time: 02/14/25  9:25 AM   Result Value Ref Range    QRS Duration 74 ms    OHS QTC Calculation 424 ms    Narrative    Test Reason : Z01.818,    Vent. Rate :  52 BPM     Atrial Rate :  52 BPM     P-R Int : 180 ms          QRS Dur :  74 ms      QT Int : 456 ms       P-R-T Axes :  10  36  31 degrees    QTcB Int : 424 ms    Sinus bradycardia  Otherwise normal ECG  When compared with ECG of 31-Oct-2020 15:40,  No significant change was found  Confirmed by Jesus Lang ([id number]) on 2/14/2025 11:11:57 AM    Referred By: BLADIMIR ABARCA            Confirmed By: Jesus Lang       2D ECHO:  TTE:  No results found for this or any previous visit.    KAROL:  No results found for this or any previous visit.     Imaging     Active Cardiac Conditions: None      Revised Cardiac Risk Index   High -Risk Surgery  Intraperitoneal; Intrathoracic; suprainguinal vascular Yes- + 1 No- 0   History of Ischemic Heart Disease   (Hx of MI/positive exercise test/current chest pain due to ischemia/use of nitrate therapy/EKG with pathological Q waves) Yes- + 1 No- 0   History of CHF  (Pulmonary edema/bilateral rales or S3 gallop/PND/CXR showing pulmonary vascular redistribution) Yes- + 1 No- 0   History of CVA   (Prior stroke or TIA) Yes- + 1 No- 0   Pre-operative treatment with insulin Yes- + 1 No- 0   Pre-operative creatinine > 2mg/dl Yes- + 1 No- 0   Total:    Risk Status:  Estimated risk of cardiac complications after non-cardiac surgery using the Revised Cardiac Risk Index for Preoperative risk is 3.9 %      ARISCAT (Canet) risk index: Low: 1.6% risk of post-op pulmonary complications.    American Society of Anesthesiologists Physical Status classification (ASA): 3               Preoperative cardiac risk assessment-  The patient does not have any active cardiac conditions . Revised cardiac risk index predictors- ---.Functional capacity is more than 4 Mets. She will be undergoing a Orthopedic procedure that carries a Moderate Risk risk     The estimated risk of the rate of adverse cardiac outcomes  3.9    No further cardiac work up is indicated prior to proceeding with the surgery          American Society of Anesthesiologists Physical status classification ( ASA ) class: 3     Postoperative pulmonary complication risk assessment: 1.6     ARISCAT ( Canet) risk index- risk class -  Low, if duration of surgery is under 3 hours, intermediate, if duration of surgery is over 3 hours          Assessment/Plan:     Hypertension  Current /77   today.    Repeat 138/92 manual  BP  Taking: Norvasc  Patient reports home BP readings of: 130s/70-80s      Lifestyle changes to reduce systolic BP:  exercise 30 minutes per day,  5 days per week or 150 minutes weekly; sodium reduction and avoidance of high salt foods such as processed meats, frozen meals and  fast foods.   Keeping a healthy weight/BMI can help with better BP control    BP acceptable for surgery. I recommend monitoring BP during perioperative period as uncontrolled pain can elevate blood pressure.         Hyperlipidemia  Continue Atorvastatin    BMI 35.0-35.9,adult  BMI 35.20    Patient would benefit from weight loss .   Lifestyle changes should be made by eating healthy, exercising at least 150 minutes weekly, and avoiding sedentary behavior.     Other specified glaucoma  She takes 2 eyedrops fro glaucoma- timolol lantanprost    Left knee pain  Surgery 3/10/25      Preventive perioperative care    Thromboembolic prophylaxis:  Her risk factors for thrombosis include morbid obesity, surgical procedure, age, and reduced mobility.I suggest  thromboembolic prophylaxis ( mechanical/pharmacological, weighing the risk benefits of pharmacological agent use considering michael procedural bleeding )  during the perioperative period.I suggested being active in the post operative period.      Postoperative pulmonary complication prophylaxis-Risk factors for post operative pulmonary complications include ASA class >2- I suggest incentive spirometry use, early ambulation, and pain control so as to avoid diaphragmatic splinting  Brush teeth twice per day, oral rinses, sleep with the head of the bed up 30 degrees     Renal complication prophylaxis-Risk factors for renal complications include age and hypertension . I suggest keeping her well hydrated and avoidance/ minimizing the use of  NSAID's,PRESTON 2 Inhibitors ,IV contrast if possible in the perioperative period.I suggested drinking 2 litre's of water a day      Surgical site Infection Prophylaxis-I   suggest appropriate antibiotic for Prophylaxis against Surgical site infections Shower with Hibiclens in the night before surgery and the morning of surgery       In view of Spine procedure the patient  is at risk of postoperative urinary retention.  I suggest avoidance / minimizing the of  Benzodiazepines,Anticholinergic medication,antihistamines ( Benadryl) , if possible in the perioperative period. I suggest using the minimum possible use of opioids for the minimum period of time in the perioperative period. Benadryl avoidance suggested      This visit was focused on Preoperative evaluation, Perioperative Medical management, complication reduction plans. I suggest that the patient follows up with primary care or relevant sub specialists for ongoing health care.    I appreciate the opportunity to be involved in this patients care. Please feel free to contact me if there were any questions about this consultation.    Patient is optimized    I spent a total of 40 minutes on the day of the visit.This includes face to face time and non-face to face time preparing to see the patient (eg, review of tests), obtaining and/or reviewing separately obtained history, documenting clinical information in the electronic or other health record, independently interpreting results and communicating results to the patient/family/caregiver, or care coordinator.      Adria Lamb NP  Perioperative Medicine  [facility]   Pager [phone]

## 2025-02-14 NOTE — H&P (VIEW-ONLY)
CC:  Left knee pain    Shiloh Thayer is a 59 y.o. female with history of Left knee pain. Pain is worse with activity and weight bearing.  Patient has experienced interference of activities of daily living due to decreased range of motion and an increase in joint pain and swelling.  Patient has failed non-operative treatment including NSAIDs, corticosteroid injections, viscosupplement injections, and activity modification.  Shiloh Thayer currently ambulates independently.     Relevant medical conditions of significance in perioperative period:  HTN- on medication managed by pcp  HLD- on medication managed by pcp    Past Medical History:   Diagnosis Date    Arthritis     Back pain 2024    Class 2 severe obesity due to excess calories with serious comorbidity and body mass index (BMI) of 37.0 to 37.9 in adult 2024    Glaucoma     Hyperlipidemia     Hypertension     Insomnia 2024    Obesity, Class I, BMI 30.0-34.9 (see actual BMI) 2019    Prediabetes     Sleep apnea        Past Surgical History:   Procedure Laterality Date     SECTION      CHOLECYSTECTOMY      COLONOSCOPY N/A 2024    Procedure: COLONOSCOPY;  Surgeon: Jarred Olivarez MD;  Location: [facility] beds [facility] ENDOSCOPY;  Service: Endoscopy;  Laterality: N/A;  24- Rx reordered, lvm and portal msg for pc. DBM  24- pt returned call, pc complete. DBM  24- r/s to later date, instr to portal. DBM   pt r/s- precall complete- suflave inst portal-RB    HYSTERECTOMY         Family History   Problem Relation Name Age of Onset    Stroke Mother Lisbet Gomez     Diabetes Mother Lisbet Gomez     Hypertension Mother Lisbet Gomez     Glaucoma Mother Lisbet Gomez     Brain aneurysm Mother Lisbet Gomez     Heart disease Father           from MI age 50s       Review of patient's allergies indicates:  No Known Allergies      Current Outpatient Medications:     amLODIPine (NORVASC) 5 MG tablet, Take 1  tablet (5 mg total) by mouth once daily., Disp: 90 tablet, Rfl: 0    atorvastatin (LIPITOR) 20 MG tablet, TAKE 1 TABLET BY MOUTH EVERY DAY, Disp: 90 tablet, Rfl: 3    hydroCHLOROthiazide (HYDRODIURIL) 12.5 MG Tab, Take 1 tablet (12.5 mg total) by mouth once daily., Disp: 90 tablet, Rfl: 0    ibuprofen (ADVIL,MOTRIN) 200 MG tablet, Take 200 mg by mouth as needed for Pain., Disp: , Rfl:     latanoprost 0.005 % ophthalmic solution, , Disp: , Rfl:     meloxicam (MOBIC) 15 MG tablet, Take 1 tablet (15 mg total) by mouth once daily., Disp: 30 tablet, Rfl: 2    meloxicam (MOBIC) 15 MG tablet, Take 1 tablet (15 mg total) by mouth once daily., Disp: 90 tablet, Rfl: 2    multivitamin (THERAGRAN) per tablet, Take 1 tablet by mouth once daily., Disp: , Rfl:     timolol maleate 0.5% (TIMOPTIC) 0.5 % Drop, Place 1 drop into both eyes 2 (two) times daily., Disp: , Rfl:     Review of Systems:  Constitutional: no fever or chills  Eyes: no visual changes  ENT: no nasal congestion or sore throat  Respiratory: no cough or shortness of breath  Cardiovascular: no chest pain or palpitations  Gastrointestinal: no nausea or vomiting, tolerating diet  Genitourinary: no hematuria or dysuria  Integument/Breast: no rash or pruritis  Hematologic/Lymphatic: no easy bruising or lymphadenopathy  Musculoskeletal: positive for knee pain  Neurological: no seizures or tremors  Behavioral/Psych: no auditory or visual hallucinations  Endocrine: no heat or cold intolerance    PE:  LMP  (LMP Unknown) Comment: hysterectomy   General: Pleasant, cooperative, NAD   Gait: antalgic  HEENT: NCAT, sclera nonicteric   Lungs: Respirations clear bilaterally; equal and unlabored.   CV: S1S2; 2+ bilateral upper and lower extremity pulses.   Skin: Intact throughout with no rashes, erythema, or lesions  Extremities: No LE edema,  no erythema or warmth of the skin in either lower extremity.    Left knee exam:  Knee Range of Motion:normal, pain with passive range of  motion  Effusion:none  Condition of skin:intact  Location of tenderness:Medial joint line   Strength:normal  Stability: stable to testing    Hip Examination: painless PROM of hip     Radiographs: Radiographs reveal advanced degenerative changes including subchondral cyst formation, subchondral sclerosis, osteophyte formation, joint space narrowing.     Knee Alignment: normal    Diagnosis: Primary osteoarthritis Left knee    Plan: Left total knee arthroplasty    Due to the serious nature of total joint infection and the high prevalence of community acquired MRSA, vancomycin will be used perioperatively.

## 2025-02-14 NOTE — H&P
CC:  Left knee pain    Shiloh Thayer is a 59 y.o. female with history of Left knee pain. Pain is worse with activity and weight bearing.  Patient has experienced interference of activities of daily living due to decreased range of motion and an increase in joint pain and swelling.  Patient has failed non-operative treatment including NSAIDs, corticosteroid injections, viscosupplement injections, and activity modification.  Shiloh Thayer currently ambulates independently.     Relevant medical conditions of significance in perioperative period:  HTN- on medication managed by pcp  HLD- on medication managed by pcp    Past Medical History:   Diagnosis Date    Arthritis     Back pain 2024    Class 2 severe obesity due to excess calories with serious comorbidity and body mass index (BMI) of 37.0 to 37.9 in adult 2024    Glaucoma     Hyperlipidemia     Hypertension     Insomnia 2024    Obesity, Class I, BMI 30.0-34.9 (see actual BMI) 2019    Prediabetes     Sleep apnea        Past Surgical History:   Procedure Laterality Date     SECTION      CHOLECYSTECTOMY      COLONOSCOPY N/A 2024    Procedure: COLONOSCOPY;  Surgeon: Jarred Olivarez MD;  Location: [facility] ENDOSCOPY;  Service: Endoscopy;  Laterality: N/A;  24- Rx reordered, lvm and portal msg for pc. DBM  24- pt returned call, pc complete. DBM  24- r/s to later date, instr to portal. DBM   pt r/s- precall complete- suflave inst portal-RB    HYSTERECTOMY         Family History   Problem Relation Name Age of Onset    Stroke Mother Lisbet Gomez     Diabetes Mother Lisbet Gomez     Hypertension Mother Lisbet Gomez     Glaucoma Mother Lisbet Gomez     Brain aneurysm Mother Lisbet Gomez     Heart disease Father           from MI age 50s       Review of patient's allergies indicates:  No Known Allergies      Current Outpatient Medications:     amLODIPine (NORVASC) 5 MG tablet, Take 1  tablet (5 mg total) by mouth once daily., Disp: 90 tablet, Rfl: 0    atorvastatin (LIPITOR) 20 MG tablet, TAKE 1 TABLET BY MOUTH EVERY DAY, Disp: 90 tablet, Rfl: 3    hydroCHLOROthiazide (HYDRODIURIL) 12.5 MG Tab, Take 1 tablet (12.5 mg total) by mouth once daily., Disp: 90 tablet, Rfl: 0    ibuprofen (ADVIL,MOTRIN) 200 MG tablet, Take 200 mg by mouth as needed for Pain., Disp: , Rfl:     latanoprost 0.005 % ophthalmic solution, , Disp: , Rfl:     meloxicam (MOBIC) 15 MG tablet, Take 1 tablet (15 mg total) by mouth once daily., Disp: 30 tablet, Rfl: 2    meloxicam (MOBIC) 15 MG tablet, Take 1 tablet (15 mg total) by mouth once daily., Disp: 90 tablet, Rfl: 2    multivitamin (THERAGRAN) per tablet, Take 1 tablet by mouth once daily., Disp: , Rfl:     timolol maleate 0.5% (TIMOPTIC) 0.5 % Drop, Place 1 drop into both eyes 2 (two) times daily., Disp: , Rfl:     Review of Systems:  Constitutional: no fever or chills  Eyes: no visual changes  ENT: no nasal congestion or sore throat  Respiratory: no cough or shortness of breath  Cardiovascular: no chest pain or palpitations  Gastrointestinal: no nausea or vomiting, tolerating diet  Genitourinary: no hematuria or dysuria  Integument/Breast: no rash or pruritis  Hematologic/Lymphatic: no easy bruising or lymphadenopathy  Musculoskeletal: positive for knee pain  Neurological: no seizures or tremors  Behavioral/Psych: no auditory or visual hallucinations  Endocrine: no heat or cold intolerance    PE:  LMP  (LMP Unknown) Comment: hysterectomy   General: Pleasant, cooperative, NAD   Gait: antalgic  HEENT: NCAT, sclera nonicteric   Lungs: Respirations clear bilaterally; equal and unlabored.   CV: S1S2; 2+ bilateral upper and lower extremity pulses.   Skin: Intact throughout with no rashes, erythema, or lesions  Extremities: No LE edema,  no erythema or warmth of the skin in either lower extremity.    Left knee exam:  Knee Range of Motion:normal, pain with passive range of  motion  Effusion:none  Condition of skin:intact  Location of tenderness:Medial joint line   Strength:normal  Stability: stable to testing    Hip Examination: painless PROM of hip     Radiographs: Radiographs reveal advanced degenerative changes including subchondral cyst formation, subchondral sclerosis, osteophyte formation, joint space narrowing.     Knee Alignment: normal    Diagnosis: Primary osteoarthritis Left knee    Plan: Left total knee arthroplasty    Due to the serious nature of total joint infection and the high prevalence of community acquired MRSA, vancomycin will be used perioperatively.

## 2025-02-14 NOTE — OUTPATIENT SUBJECTIVE & OBJECTIVE
Outpatient Subjective & Objective      Chief Complaint: Preoperative evaulation, perioperative medical management, and complication reduction plan.     Functional Capacity:  Able to climb a flight of stairs without CP SOB or Syncope.  Able to meet 4 METs      Anesthesia issues: None    Difficulty mouth opening: none    Steroid use in the last 12 months: none     Dental Issues: none    Family anesthesia difficulty: None     Family Hx of Thrombosis none    Past Medical History:   Diagnosis Date    Arthritis     Back pain 2024    Class 2 severe obesity due to excess calories with serious comorbidity and body mass index (BMI) of 37.0 to 37.9 in adult 2024    Glaucoma     Hyperlipidemia     Hypertension     Insomnia 2024    Obesity, Class I, BMI 30.0-34.9 (see actual BMI) 2019    Prediabetes     Sleep apnea      Past Surgical History:   Procedure Laterality Date     SECTION      CHOLECYSTECTOMY      COLONOSCOPY N/A 2024    Procedure: COLONOSCOPY;  Surgeon: Jarred Olivarez MD;  Location: [facility] ENDOSCOPY;  Service: Endoscopy;  Laterality: N/A;  24- Rx reordered, lvm and portal msg for pc. DB  24- pt returned call, pc complete. DBM  24- r/s to later date, instr to portal. DB   pt r/s- precall complete- suflave inst portal-RB    HYSTERECTOMY         Review of Systems   Constitutional:  Negative for chills, fatigue and fever.   HENT:  Negative for trouble swallowing and voice change.    Eyes:  Negative for photophobia and visual disturbance.        No acute visual changes   Respiratory:  Negative for apnea, cough, chest tightness, shortness of breath and wheezing.         STOP bang  Score 4    High risk JACKIE   Cardiovascular:  Negative for chest pain, palpitations and leg swelling.   Gastrointestinal:  Negative for abdominal distention, abdominal pain, blood in stool, constipation, diarrhea, nausea and vomiting.        NO FLD, hepatitis, cirrhosis  No BRB or black  "tarry stool     Genitourinary:  Negative for difficulty urinating, dysuria, flank pain, frequency, hematuria and urgency.   Musculoskeletal:  Positive for arthralgias, gait problem and joint swelling. Negative for back pain, myalgias, neck pain and neck stiffness.   Neurological:  Negative for dizziness, tremors, seizures, syncope, weakness, light-headedness, numbness and headaches.   Psychiatric/Behavioral:  Negative for suicidal ideas.           VITALS  Visit Vitals  BP (!) 138/92   Pulse 62   Temp 98 °F (36.7 °C) (Oral)   Ht 5' 8" (1.727 m)   Wt 105 kg (231 lb 7.7 oz)   LMP  (LMP Unknown)   SpO2 97%   BMI 35.20 kg/m²          Physical Exam  Constitutional:       General: She is not in acute distress.     Appearance: Normal appearance. She is well-developed. She is not diaphoretic.   HENT:      Head: Normocephalic.      Right Ear: Hearing normal.      Left Ear: Hearing normal.      Nose: Nose normal.      Mouth/Throat:      Lips: Pink.      Mouth: Mucous membranes are moist.      Pharynx: No oropharyngeal exudate.   Eyes:      General: Lids are normal.         Right eye: No discharge.         Left eye: No discharge.      Conjunctiva/sclera: Conjunctivae normal.      Pupils: Pupils are equal, round, and reactive to light.   Neck:      Thyroid: No thyromegaly.      Vascular: No carotid bruit or JVD.      Trachea: Trachea and phonation normal. No tracheal deviation.   Cardiovascular:      Rate and Rhythm: Normal rate and regular rhythm.      Pulses: Normal pulses.           Carotid pulses are 2+ on the right side and 2+ on the left side.       Radial pulses are 2+ on the right side and 2+ on the left side.        Posterior tibial pulses are 2+ on the right side and 2+ on the left side.      Heart sounds: Normal heart sounds. No murmur heard.     No friction rub. No gallop.   Pulmonary:      Effort: Pulmonary effort is normal. No respiratory distress.      Breath sounds: Normal breath sounds. No stridor. No wheezing " or rales.      Comments: Clear Anterior and Posterior BBS  Abdominal:      General: Abdomen is protuberant. Bowel sounds are normal. There is no distension.      Palpations: Abdomen is soft.      Tenderness: There is no abdominal tenderness. There is no guarding.   Musculoskeletal:         General: No tenderness or deformity. Normal range of motion.      Cervical back: Normal range of motion and neck supple. No rigidity.      Right lower leg: No edema.      Left lower leg: No edema.   Lymphadenopathy:      Head:      Right side of head: No submental, submandibular, tonsillar, preauricular, posterior auricular or occipital adenopathy.      Left side of head: No submental, submandibular, tonsillar, preauricular, posterior auricular or occipital adenopathy.      Cervical: No cervical adenopathy.      Right cervical: No superficial cervical adenopathy.     Left cervical: No superficial cervical adenopathy.   Skin:     General: Skin is warm and dry.      Capillary Refill: Capillary refill takes 2 to 3 seconds.      Coloration: Skin is not pale.      Findings: No erythema or rash.   Neurological:      Mental Status: She is alert and oriented to person, place, and time. Mental status is at baseline.      GCS: GCS eye subscore is 4. GCS verbal subscore is 5. GCS motor subscore is 6.      Motor: No abnormal muscle tone.      Coordination: Coordination normal.   Psychiatric:         Attention and Perception: Attention and perception normal.         Mood and Affect: Mood and affect normal.         Speech: Speech normal.         Behavior: Behavior normal. Behavior is cooperative.         Thought Content: Thought content normal.         Cognition and Memory: Cognition and memory normal.         Judgment: Judgment normal.          Significant Labs:  Lab Results   Component Value Date    WBC 4.48 02/14/2025    HGB 12.5 02/14/2025    HCT 39.3 02/14/2025     02/14/2025    CHOL 263 (H) 01/09/2024    TRIG 70 01/09/2024    HDL  84 (H) 01/09/2024    ALT 11 02/14/2025    AST 19 02/14/2025     02/14/2025    K 4.1 02/14/2025     02/14/2025    CREATININE 0.7 02/14/2025    BUN 13 02/14/2025    CO2 28 02/14/2025    TSH 1.167 01/09/2024    INR 0.9 02/14/2025    HGBA1C 5.4 01/09/2024       Diagnostic Studies: No relevant studies.    EKG:   Results for orders placed or performed during the hospital encounter of 02/14/25   EKG 12-lead    Collection Time: 02/14/25  9:25 AM   Result Value Ref Range    QRS Duration 74 ms    OHS QTC Calculation 424 ms    Narrative    Test Reason : Z01.818,    Vent. Rate :  52 BPM     Atrial Rate :  52 BPM     P-R Int : 180 ms          QRS Dur :  74 ms      QT Int : 456 ms       P-R-T Axes :  10  36  31 degrees    QTcB Int : 424 ms    Sinus bradycardia  Otherwise normal ECG  When compared with ECG of 31-Oct-2020 15:40,  No significant change was found  Confirmed by Jesus Lang ([id number]) on 2/14/2025 11:11:57 AM    Referred By: BLADIMIR ABARCA           Confirmed By: Jesus Lang       2D ECHO:  TTE:  No results found for this or any previous visit.    KAROL:  No results found for this or any previous visit.     Imaging     Active Cardiac Conditions: None      Revised Cardiac Risk Index   High -Risk Surgery  Intraperitoneal; Intrathoracic; suprainguinal vascular Yes- + 1 No- 0   History of Ischemic Heart Disease   (Hx of MI/positive exercise test/current chest pain due to ischemia/use of nitrate therapy/EKG with pathological Q waves) Yes- + 1 No- 0   History of CHF  (Pulmonary edema/bilateral rales or S3 gallop/PND/CXR showing pulmonary vascular redistribution) Yes- + 1 No- 0   History of CVA   (Prior stroke or TIA) Yes- + 1 No- 0   Pre-operative treatment with insulin Yes- + 1 No- 0   Pre-operative creatinine > 2mg/dl Yes- + 1 No- 0   Total:    Risk Status:  Estimated risk of cardiac complications after non-cardiac surgery using the Revised Cardiac Risk Index for Preoperative risk is 3.9 %      ARISCAT (Canet) risk  index: Low: 1.6% risk of post-op pulmonary complications.    American Society of Anesthesiologists Physical Status classification (ASA): 3           Outpatient Subjective & Objective     No

## 2025-02-14 NOTE — PROGRESS NOTES
Shiloh Thayer is a 59 y.o. year old here today for pre surgery optimization visit  in preparation for a Left total knee arthroplasty to be performed by Dr. Vargas on 3/10/2025.  she was last seen and treated in the clinic on 11/14/2024. she will be medically optimized by the pre op center. There has been no significant change in medical status since last visit. No fever, chills, malaise, or unexplained weight change.      Allergies, Medications, past medical and surgical history reviewed.    Focused examination performed.    Patient did not request to see surgeon in clinic today. All questions answered. Patient encouraged to call with questions. Contact information given.     Pre, michael, and post operative procedures and expectations discussed. Goals of successful surgery reviewed and include manageable pain levels, surgical site free of infection, medication management, and ambulation with PT/OT assistance. Healthy weight management discussed with patient and caregiver who were receptive to eduction of healthy diet and activity. No other necessary lifestyle changes identified. Educated patient about signs and symptoms of infection, medication management, anticoagulation therapy, risk of tobacco and alcohol use, and self-care to promote healing. Surgical guide given for future reference. Hibiclens given to patient with instructions. All questions were answered.     Shiloh Thayer verbalized an understanding to the education and goals. Patient has displayed readiness to engage in care and is ready to proceed with surgery.  Patient reports her , Mak, is able and ready to provide assistance at home after discharge.    Surgical and blood consents signed.    DME will be arranged. The mobility limitation cannot be sufficiently resolved by the use of a cane. Patient's functional mobility deficit can be sufficiently resolved with the use of a (Rolling Walker or Walker). Patient's mobility limitation  "significantly impairs their ability to participate in one of more activities of daily living. The use of a (Rolling Walker or Walker) will significantly improve the patient's ability to participate in MRADLS and the patient will use it on regular basis in the home."     Shiloh Thayer will contact us if there are any questions, concerns, or changes in medical status prior to surgery.       Joint class: 2/12    Patient has discussed discharge planning with surgeon. Patient will be discharged to home following surgery.   patient will be scheduled with [facility]. PT will be done at the [place] location. Scheduled 3/12.    30 minutes of time was spent on patient education, review of records, templating, H&P, , appointment scheduling and optimizing patient for surgery.      "

## 2025-02-14 NOTE — DISCHARGE INSTRUCTIONS
Your surgery has been scheduled for:______3/10/25____________________________________    You should report to:  ____[facility], located on the [place] side of the first floor of the           [facility] ([phone])  ____[facility], located on the [facility] side of the            Second floor of the [facility] ([phone])  ____3rd Floor [facility] located on the [facility] side of the [facility] [phone]  _X___[facility]: located at [address] [facility] [place] [address]. [address].     Please Note   Tell your doctor if you take Aspirin, products containing Aspirin, herbal medications  or blood thinners, such as Coumadin, Ticlid, or Plavix.  (Consult your provider regarding holding or stopping before surgery).  Arrange for someone to drive you home following surgery.  You will not be allowed to leave the surgical facility alone or drive yourself home following sedation and anesthesia.    Before Surgery  Stop taking all herbal medications, vitamins, and supplements 7 days prior to surgery  No Motrin/Advil (Ibuprofen) 7 days before surgery  No Aleve (Naproxen) 7 days before surgery   No Goody's/BC Powder 7 days before surgery  Refrain from drinking alcoholic beverages for 24 hours before and after surgery  Stop or limit smoking at least 24 hours prior to surgery  You may take Tylenol for pain    Night before Surgery  Do not eat or drink after midnight  Take a shower or bath (shower is recommended).  Bathe with Hibiclens soap or an antibacterial soap from the neck down.  If not supplied by your surgeon, hibiclens soap will need to be purchased over the counter in pharmacy.  Rinse soap off thoroughly.  Shampoo your hair with your regular shampoo    The Day of Surgery  Take another bath or shower with hibiclens or any antibacterial soap, to reduce the chance of infection.  Take heart  and blood pressure medications with a small sip of water, as advised by the perioperative team.  Do not take fluid pills  You may brush your teeth and rinse your mouth, but do not swallow any additional water.   Do not apply perfumes, powder, body lotions or deodorant on the day of surgery.  Nail polish should be removed.  Do not wear makeup or moisturizer  Wear comfortable clothes, such as a button front shirt and loose fitting pants.  Leave all jewelry, including body piercings, and valuables at home.    Bring any devices you will need after surgery such as crutches or canes.  If you have sleep apnea, please bring your CPAP machine  In the event that your physical condition changes including the onset of a cold or respiratory illness, or if you have to delay or cancel your surgery, please notify your surgeon.

## 2025-02-14 NOTE — HPI
This is a 59 y.o. female  who presents today for a preoperative evaluation in preparation for left knee replacement  Surgery is indicated for  osteoarthritis   .   Patient is new to me.    The history has been obtained by speaking with the patient and reviewing the electronic medical record and/or outside health information. Significant health conditions for the perioperative period are discussed below in assessment and plan.     Patient reports current health status to be Good.  Denies any new symptoms before surgery.

## 2025-03-07 ENCOUNTER — TELEPHONE (OUTPATIENT)
Dept: ORTHOPEDICS | Facility: CLINIC | Age: 60
End: 2025-03-07
Payer: OTHER GOVERNMENT

## 2025-03-07 DIAGNOSIS — Z96.652 S/P TOTAL KNEE REPLACEMENT, LEFT: Primary | ICD-10-CM

## 2025-03-07 RX ORDER — POLYETHYLENE GLYCOL 3350 17 G/17G
17 POWDER, FOR SOLUTION ORAL DAILY
Qty: 119 G | Refills: 0 | Status: SHIPPED | OUTPATIENT
Start: 2025-03-07

## 2025-03-07 RX ORDER — DEXTROMETHORPHAN HYDROBROMIDE, GUAIFENESIN 5; 100 MG/5ML; MG/5ML
650 LIQUID ORAL EVERY 8 HOURS
Qty: 120 TABLET | Refills: 0 | Status: SHIPPED | OUTPATIENT
Start: 2025-03-07

## 2025-03-07 RX ORDER — CELECOXIB 200 MG/1
200 CAPSULE ORAL DAILY
Qty: 30 CAPSULE | Refills: 0 | Status: SHIPPED | OUTPATIENT
Start: 2025-03-07

## 2025-03-07 RX ORDER — ASPIRIN 81 MG/1
81 TABLET ORAL 2 TIMES DAILY
Qty: 60 TABLET | Refills: 0 | Status: SHIPPED | OUTPATIENT
Start: 2025-03-07 | End: 2025-04-06

## 2025-03-07 RX ORDER — METHOCARBAMOL 750 MG/1
750 TABLET, FILM COATED ORAL 4 TIMES DAILY PRN
Qty: 40 TABLET | Refills: 0 | Status: SHIPPED | OUTPATIENT
Start: 2025-03-07 | End: 2025-03-17

## 2025-03-07 RX ORDER — OXYCODONE HYDROCHLORIDE 5 MG/1
TABLET ORAL
Qty: 50 TABLET | Refills: 0 | Status: SHIPPED | OUTPATIENT
Start: 2025-03-07

## 2025-03-07 RX ORDER — CEFADROXIL 500 MG/1
500 CAPSULE ORAL EVERY 12 HOURS
Qty: 14 CAPSULE | Refills: 0 | Status: SHIPPED | OUTPATIENT
Start: 2025-03-07

## 2025-03-07 RX ORDER — PANTOPRAZOLE SODIUM 40 MG/1
40 TABLET, DELAYED RELEASE ORAL DAILY
Qty: 30 TABLET | Refills: 0 | Status: SHIPPED | OUTPATIENT
Start: 2025-03-07 | End: 2025-04-06

## 2025-03-07 NOTE — TELEPHONE ENCOUNTER
I called the patient today regarding surgery on 3/10/2025 with Dr. Dewayne Vargas. I informed the patient that her surgery will be at  [facility] [address] ([facility] [place] [address]). I informed the patient they must arrive at 5:00am and their surgery will start at 7:00am.     Per the [facility] COVID-19 Pre-Procedural Testing Policy (administered 10/26/2022), patients do not need tested for COVID-19 regardless of vaccination status.    I reminded the patient that they cannot eat or drink after midnight, the night before surgery.      I reminded the patient to be careful of their skin over the next few days to make sure they do not get any cuts, scratches or scrapes.    The patient verbalized that they have received their walker, bedside commode and shower chair from [facility].    The patient verbalized understanding and has no further questions.

## 2025-03-10 ENCOUNTER — ANESTHESIA EVENT (OUTPATIENT)
Dept: SURGERY | Facility: HOSPITAL | Age: 60
End: 2025-03-10
Payer: OTHER GOVERNMENT

## 2025-03-10 ENCOUNTER — ANESTHESIA (OUTPATIENT)
Dept: SURGERY | Facility: HOSPITAL | Age: 60
End: 2025-03-10
Payer: OTHER GOVERNMENT

## 2025-03-10 ENCOUNTER — HOSPITAL ENCOUNTER (OUTPATIENT)
Facility: HOSPITAL | Age: 60
Discharge: HOME | End: 2025-03-10
Attending: ORTHOPAEDIC SURGERY | Admitting: ORTHOPAEDIC SURGERY
Payer: OTHER GOVERNMENT

## 2025-03-10 VITALS
RESPIRATION RATE: 28 BRPM | BODY MASS INDEX: 34.86 KG/M2 | DIASTOLIC BLOOD PRESSURE: 61 MMHG | HEIGHT: 68 IN | TEMPERATURE: 98 F | SYSTOLIC BLOOD PRESSURE: 137 MMHG | WEIGHT: 230 LBS | HEART RATE: 60 BPM | OXYGEN SATURATION: 100 %

## 2025-03-10 DIAGNOSIS — M17.0 PRIMARY OSTEOARTHRITIS OF BOTH KNEES: ICD-10-CM

## 2025-03-10 DIAGNOSIS — M79.609 PAIN IN EXTREMITY, UNSPECIFIED EXTREMITY: ICD-10-CM

## 2025-03-10 DIAGNOSIS — M17.12 PRIMARY OSTEOARTHRITIS OF LEFT KNEE: Primary | ICD-10-CM

## 2025-03-10 DIAGNOSIS — Z01.818 PRE-OP TESTING: ICD-10-CM

## 2025-03-10 PROCEDURE — 27447 TOTAL KNEE ARTHROPLASTY: CPT | Mod: AS,LT,,

## 2025-03-10 PROCEDURE — 71000033 HC RECOVERY, INTIAL HOUR: Performed by: ORTHOPAEDIC SURGERY

## 2025-03-10 PROCEDURE — 25000003 PHARM REV CODE 250: Performed by: ANESTHESIOLOGY

## 2025-03-10 PROCEDURE — 97161 PT EVAL LOW COMPLEX 20 MIN: CPT

## 2025-03-10 PROCEDURE — 20985 CPTR-ASST DIR MS PX: CPT | Mod: ,,, | Performed by: ORTHOPAEDIC SURGERY

## 2025-03-10 PROCEDURE — 37000008 HC ANESTHESIA 1ST 15 MINUTES: Performed by: ORTHOPAEDIC SURGERY

## 2025-03-10 PROCEDURE — 36000712 HC OR TIME LEV V 1ST 15 MIN: Performed by: ORTHOPAEDIC SURGERY

## 2025-03-10 PROCEDURE — 99900035 HC TECH TIME PER 15 MIN (STAT)

## 2025-03-10 PROCEDURE — 63600175 PHARM REV CODE 636 W HCPCS: Performed by: NURSE ANESTHETIST, CERTIFIED REGISTERED

## 2025-03-10 PROCEDURE — 97530 THERAPEUTIC ACTIVITIES: CPT

## 2025-03-10 PROCEDURE — 37000009 HC ANESTHESIA EA ADD 15 MINS: Performed by: ORTHOPAEDIC SURGERY

## 2025-03-10 PROCEDURE — 63600175 PHARM REV CODE 636 W HCPCS: Performed by: NURSE PRACTITIONER

## 2025-03-10 PROCEDURE — 97165 OT EVAL LOW COMPLEX 30 MIN: CPT

## 2025-03-10 PROCEDURE — 25000003 PHARM REV CODE 250: Performed by: ORTHOPAEDIC SURGERY

## 2025-03-10 PROCEDURE — 71000039 HC RECOVERY, EACH ADD'L HOUR: Performed by: ORTHOPAEDIC SURGERY

## 2025-03-10 PROCEDURE — 25000003 PHARM REV CODE 250: Performed by: NURSE ANESTHETIST, CERTIFIED REGISTERED

## 2025-03-10 PROCEDURE — 36000713 HC OR TIME LEV V EA ADD 15 MIN: Performed by: ORTHOPAEDIC SURGERY

## 2025-03-10 PROCEDURE — 97116 GAIT TRAINING THERAPY: CPT

## 2025-03-10 PROCEDURE — 63600175 PHARM REV CODE 636 W HCPCS: Performed by: ANESTHESIOLOGY

## 2025-03-10 PROCEDURE — 71000016 HC POSTOP RECOV ADDL HR: Performed by: ORTHOPAEDIC SURGERY

## 2025-03-10 PROCEDURE — 27201423 OPTIME MED/SURG SUP & DEVICES STERILE SUPPLY: Performed by: ORTHOPAEDIC SURGERY

## 2025-03-10 PROCEDURE — 97535 SELF CARE MNGMENT TRAINING: CPT

## 2025-03-10 PROCEDURE — 27447 TOTAL KNEE ARTHROPLASTY: CPT | Mod: 22,LT,, | Performed by: ORTHOPAEDIC SURGERY

## 2025-03-10 PROCEDURE — 63600175 PHARM REV CODE 636 W HCPCS: Performed by: ORTHOPAEDIC SURGERY

## 2025-03-10 PROCEDURE — C1776 JOINT DEVICE (IMPLANTABLE): HCPCS | Performed by: ORTHOPAEDIC SURGERY

## 2025-03-10 PROCEDURE — 25000003 PHARM REV CODE 250: Performed by: NURSE PRACTITIONER

## 2025-03-10 PROCEDURE — 94761 N-INVAS EAR/PLS OXIMETRY MLT: CPT

## 2025-03-10 PROCEDURE — D9220A PRA ANESTHESIA: Mod: ANES,,, | Performed by: ANESTHESIOLOGY

## 2025-03-10 PROCEDURE — C1713 ANCHOR/SCREW BN/BN,TIS/BN: HCPCS | Performed by: ORTHOPAEDIC SURGERY

## 2025-03-10 PROCEDURE — 71000015 HC POSTOP RECOV 1ST HR: Performed by: ORTHOPAEDIC SURGERY

## 2025-03-10 PROCEDURE — D9220A PRA ANESTHESIA: Mod: CRNA,,, | Performed by: NURSE ANESTHETIST, CERTIFIED REGISTERED

## 2025-03-10 DEVICE — ATTUNE KNEE SYSTEM TIBIAL INSERT FIXED BEARING POSTERIOR STABILIZED 6 6MM AOX
Type: IMPLANTABLE DEVICE | Site: KNEE | Status: FUNCTIONAL
Brand: ATTUNE

## 2025-03-10 DEVICE — ATTUNE FEMORAL POROCOAT POSTERIOR STABILIZED SIZE 6 LEFT CEMENTLESS
Type: IMPLANTABLE DEVICE | Site: KNEE | Status: FUNCTIONAL
Brand: ATTUNE

## 2025-03-10 DEVICE — DEPUY CMW 2 FAST SET BONE CEMENT 20G: Type: IMPLANTABLE DEVICE | Site: KNEE | Status: FUNCTIONAL

## 2025-03-10 DEVICE — ATTUNE KNEE SYSTEM TIBIAL BASE AFFIXIUM FIXED BEARING SIZE 6
Type: IMPLANTABLE DEVICE | Site: KNEE | Status: FUNCTIONAL
Brand: ATTUNE AFFIXIUM

## 2025-03-10 DEVICE — ATTUNE PATELLA MEDIALIZED DOME 35MM CEMENTED AOX
Type: IMPLANTABLE DEVICE | Site: KNEE | Status: FUNCTIONAL
Brand: ATTUNE

## 2025-03-10 RX ORDER — DEXAMETHASONE SODIUM PHOSPHATE 4 MG/ML
4 INJECTION, SOLUTION INTRA-ARTICULAR; INTRALESIONAL; INTRAMUSCULAR; INTRAVENOUS; SOFT TISSUE ONCE
Status: DISCONTINUED | OUTPATIENT
Start: 2025-03-10 | End: 2025-03-10

## 2025-03-10 RX ORDER — LIDOCAINE HYDROCHLORIDE 20 MG/ML
INJECTION INTRAVENOUS
Status: DISCONTINUED | OUTPATIENT
Start: 2025-03-10 | End: 2025-03-10

## 2025-03-10 RX ORDER — SODIUM CHLORIDE 0.9 % (FLUSH) 0.9 %
3 SYRINGE (ML) INJECTION
Status: DISCONTINUED | OUTPATIENT
Start: 2025-03-10 | End: 2025-03-10 | Stop reason: HOSPADM

## 2025-03-10 RX ORDER — BISACODYL 10 MG/1
10 SUPPOSITORY RECTAL EVERY 12 HOURS PRN
Status: DISCONTINUED | OUTPATIENT
Start: 2025-03-10 | End: 2025-03-10 | Stop reason: HOSPADM

## 2025-03-10 RX ORDER — MUPIROCIN 20 MG/G
1 OINTMENT TOPICAL
Status: COMPLETED | OUTPATIENT
Start: 2025-03-10 | End: 2025-03-10

## 2025-03-10 RX ORDER — NALOXONE HCL 0.4 MG/ML
0.02 VIAL (ML) INJECTION
Status: DISCONTINUED | OUTPATIENT
Start: 2025-03-10 | End: 2025-03-10 | Stop reason: HOSPADM

## 2025-03-10 RX ORDER — PROCHLORPERAZINE EDISYLATE 5 MG/ML
5 INJECTION INTRAMUSCULAR; INTRAVENOUS EVERY 6 HOURS PRN
Status: DISCONTINUED | OUTPATIENT
Start: 2025-03-10 | End: 2025-03-10 | Stop reason: HOSPADM

## 2025-03-10 RX ORDER — SODIUM CHLORIDE 9 MG/ML
INJECTION, SOLUTION INTRAVENOUS CONTINUOUS
Status: DISCONTINUED | OUTPATIENT
Start: 2025-03-10 | End: 2025-03-10 | Stop reason: HOSPADM

## 2025-03-10 RX ORDER — METHOCARBAMOL 500 MG/1
1000 TABLET, FILM COATED ORAL ONCE
Status: COMPLETED | OUTPATIENT
Start: 2025-03-10 | End: 2025-03-10

## 2025-03-10 RX ORDER — TRANEXAMIC ACID 100 MG/ML
INJECTION, SOLUTION INTRAVENOUS
Status: DISCONTINUED | OUTPATIENT
Start: 2025-03-10 | End: 2025-03-10 | Stop reason: HOSPADM

## 2025-03-10 RX ORDER — FENTANYL CITRATE 50 UG/ML
100 INJECTION, SOLUTION INTRAMUSCULAR; INTRAVENOUS
Refills: 0 | Status: DISCONTINUED | OUTPATIENT
Start: 2025-03-10 | End: 2025-03-10 | Stop reason: HOSPADM

## 2025-03-10 RX ORDER — OXYCODONE HYDROCHLORIDE 5 MG/1
5 TABLET ORAL
Refills: 0 | Status: DISCONTINUED | OUTPATIENT
Start: 2025-03-10 | End: 2025-03-10 | Stop reason: HOSPADM

## 2025-03-10 RX ORDER — OXYCODONE HYDROCHLORIDE 5 MG/1
5 TABLET ORAL ONCE
Status: COMPLETED | OUTPATIENT
Start: 2025-03-10 | End: 2025-03-10

## 2025-03-10 RX ORDER — ROPIVACAINE/EPI/CLONIDINE/KET 2.46-0.005
SYRINGE (ML) INJECTION
Status: DISCONTINUED | OUTPATIENT
Start: 2025-03-10 | End: 2025-03-10 | Stop reason: HOSPADM

## 2025-03-10 RX ORDER — CELECOXIB 200 MG/1
400 CAPSULE ORAL ONCE
Status: COMPLETED | OUTPATIENT
Start: 2025-03-10 | End: 2025-03-10

## 2025-03-10 RX ORDER — MIDAZOLAM HYDROCHLORIDE 1 MG/ML
4 INJECTION, SOLUTION INTRAMUSCULAR; INTRAVENOUS
Status: DISCONTINUED | OUTPATIENT
Start: 2025-03-10 | End: 2025-03-10 | Stop reason: HOSPADM

## 2025-03-10 RX ORDER — LIDOCAINE HYDROCHLORIDE 10 MG/ML
1 INJECTION, SOLUTION EPIDURAL; INFILTRATION; INTRACAUDAL; PERINEURAL
Status: DISCONTINUED | OUTPATIENT
Start: 2025-03-10 | End: 2025-03-10 | Stop reason: HOSPADM

## 2025-03-10 RX ORDER — OXYCODONE HYDROCHLORIDE 10 MG/1
10 TABLET ORAL
Refills: 0 | Status: DISCONTINUED | OUTPATIENT
Start: 2025-03-10 | End: 2025-03-10 | Stop reason: HOSPADM

## 2025-03-10 RX ORDER — TRANEXAMIC ACID 100 MG/ML
INJECTION, SOLUTION INTRAVENOUS
Status: DISCONTINUED | OUTPATIENT
Start: 2025-03-10 | End: 2025-03-10

## 2025-03-10 RX ORDER — PROPOFOL 10 MG/ML
VIAL (ML) INTRAVENOUS
Status: DISCONTINUED | OUTPATIENT
Start: 2025-03-10 | End: 2025-03-10

## 2025-03-10 RX ORDER — ONDANSETRON HYDROCHLORIDE 2 MG/ML
4 INJECTION, SOLUTION INTRAVENOUS EVERY 8 HOURS PRN
Status: DISCONTINUED | OUTPATIENT
Start: 2025-03-10 | End: 2025-03-10 | Stop reason: HOSPADM

## 2025-03-10 RX ORDER — VANCOMYCIN HYDROCHLORIDE 1 G/20ML
INJECTION, POWDER, LYOPHILIZED, FOR SOLUTION INTRAVENOUS
Status: DISCONTINUED | OUTPATIENT
Start: 2025-03-10 | End: 2025-03-10 | Stop reason: HOSPADM

## 2025-03-10 RX ORDER — AMOXICILLIN 250 MG
1 CAPSULE ORAL 2 TIMES DAILY
Status: DISCONTINUED | OUTPATIENT
Start: 2025-03-10 | End: 2025-03-10 | Stop reason: HOSPADM

## 2025-03-10 RX ORDER — CEFAZOLIN 2 G/1
2 INJECTION, POWDER, FOR SOLUTION INTRAMUSCULAR; INTRAVENOUS
Status: DISCONTINUED | OUTPATIENT
Start: 2025-03-10 | End: 2025-03-10 | Stop reason: HOSPADM

## 2025-03-10 RX ORDER — FENTANYL CITRATE 50 UG/ML
25 INJECTION, SOLUTION INTRAMUSCULAR; INTRAVENOUS EVERY 5 MIN PRN
Status: DISCONTINUED | OUTPATIENT
Start: 2025-03-10 | End: 2025-03-10 | Stop reason: HOSPADM

## 2025-03-10 RX ORDER — PREGABALIN 75 MG/1
75 CAPSULE ORAL
Status: COMPLETED | OUTPATIENT
Start: 2025-03-10 | End: 2025-03-10

## 2025-03-10 RX ORDER — FENTANYL CITRATE 50 UG/ML
INJECTION, SOLUTION INTRAMUSCULAR; INTRAVENOUS
Status: DISCONTINUED | OUTPATIENT
Start: 2025-03-10 | End: 2025-03-10

## 2025-03-10 RX ORDER — SODIUM CHLORIDE 9 MG/ML
INJECTION, SOLUTION INTRAVENOUS
Status: COMPLETED | OUTPATIENT
Start: 2025-03-10 | End: 2025-03-10

## 2025-03-10 RX ORDER — HALOPERIDOL LACTATE 5 MG/ML
0.5 INJECTION, SOLUTION INTRAMUSCULAR EVERY 10 MIN PRN
Status: DISCONTINUED | OUTPATIENT
Start: 2025-03-10 | End: 2025-03-10 | Stop reason: HOSPADM

## 2025-03-10 RX ORDER — METHOCARBAMOL 750 MG/1
750 TABLET, FILM COATED ORAL 3 TIMES DAILY
Status: DISCONTINUED | OUTPATIENT
Start: 2025-03-10 | End: 2025-03-10 | Stop reason: HOSPADM

## 2025-03-10 RX ORDER — ONDANSETRON HYDROCHLORIDE 2 MG/ML
INJECTION, SOLUTION INTRAVENOUS
Status: DISCONTINUED | OUTPATIENT
Start: 2025-03-10 | End: 2025-03-10

## 2025-03-10 RX ORDER — MIDAZOLAM HYDROCHLORIDE 1 MG/ML
INJECTION INTRAMUSCULAR; INTRAVENOUS
Status: DISCONTINUED | OUTPATIENT
Start: 2025-03-10 | End: 2025-03-10

## 2025-03-10 RX ORDER — PREGABALIN 75 MG/1
75 CAPSULE ORAL NIGHTLY
Status: DISCONTINUED | OUTPATIENT
Start: 2025-03-10 | End: 2025-03-10 | Stop reason: HOSPADM

## 2025-03-10 RX ORDER — FAMOTIDINE 20 MG/1
20 TABLET, FILM COATED ORAL 2 TIMES DAILY
Status: DISCONTINUED | OUTPATIENT
Start: 2025-03-10 | End: 2025-03-10 | Stop reason: HOSPADM

## 2025-03-10 RX ORDER — ACETAMINOPHEN 500 MG
1000 TABLET ORAL
Status: COMPLETED | OUTPATIENT
Start: 2025-03-10 | End: 2025-03-10

## 2025-03-10 RX ORDER — ASPIRIN 81 MG/1
81 TABLET ORAL 2 TIMES DAILY
Status: DISCONTINUED | OUTPATIENT
Start: 2025-03-10 | End: 2025-03-10 | Stop reason: HOSPADM

## 2025-03-10 RX ORDER — OXYCODONE HYDROCHLORIDE 5 MG/1
5 TABLET ORAL
Status: DISCONTINUED | OUTPATIENT
Start: 2025-03-10 | End: 2025-03-10 | Stop reason: HOSPADM

## 2025-03-10 RX ORDER — CEFAZOLIN SODIUM 1 G/3ML
INJECTION, POWDER, FOR SOLUTION INTRAMUSCULAR; INTRAVENOUS
Status: DISCONTINUED | OUTPATIENT
Start: 2025-03-10 | End: 2025-03-10

## 2025-03-10 RX ORDER — SODIUM CHLORIDE 0.9 % (FLUSH) 0.9 %
10 SYRINGE (ML) INJECTION
Status: DISCONTINUED | OUTPATIENT
Start: 2025-03-10 | End: 2025-03-10 | Stop reason: HOSPADM

## 2025-03-10 RX ORDER — ACETAMINOPHEN 500 MG
1000 TABLET ORAL EVERY 6 HOURS
Status: DISCONTINUED | OUTPATIENT
Start: 2025-03-10 | End: 2025-03-10 | Stop reason: HOSPADM

## 2025-03-10 RX ORDER — DEXAMETHASONE SODIUM PHOSPHATE 4 MG/ML
INJECTION, SOLUTION INTRA-ARTICULAR; INTRALESIONAL; INTRAMUSCULAR; INTRAVENOUS; SOFT TISSUE
Status: DISCONTINUED | OUTPATIENT
Start: 2025-03-10 | End: 2025-03-10

## 2025-03-10 RX ORDER — PROPOFOL 10 MG/ML
VIAL (ML) INTRAVENOUS CONTINUOUS PRN
Status: DISCONTINUED | OUTPATIENT
Start: 2025-03-10 | End: 2025-03-10

## 2025-03-10 RX ORDER — POLYETHYLENE GLYCOL 3350 17 G/17G
17 POWDER, FOR SOLUTION ORAL DAILY
Status: DISCONTINUED | OUTPATIENT
Start: 2025-03-10 | End: 2025-03-10 | Stop reason: HOSPADM

## 2025-03-10 RX ADMIN — LIDOCAINE HYDROCHLORIDE 50 MG: 20 INJECTION INTRAVENOUS at 07:03

## 2025-03-10 RX ADMIN — SENNOSIDES AND DOCUSATE SODIUM 1 TABLET: 50; 8.6 TABLET ORAL at 09:03

## 2025-03-10 RX ADMIN — CEFAZOLIN 2 G: 330 INJECTION, POWDER, FOR SOLUTION INTRAMUSCULAR; INTRAVENOUS at 07:03

## 2025-03-10 RX ADMIN — ACETAMINOPHEN 1000 MG: 500 TABLET ORAL at 05:03

## 2025-03-10 RX ADMIN — FENTANYL CITRATE 25 MCG: 50 INJECTION INTRAMUSCULAR; INTRAVENOUS at 09:03

## 2025-03-10 RX ADMIN — CELECOXIB 400 MG: 200 CAPSULE ORAL at 05:03

## 2025-03-10 RX ADMIN — METHOCARBAMOL 1000 MG: 500 TABLET ORAL at 09:03

## 2025-03-10 RX ADMIN — SODIUM CHLORIDE: 9 INJECTION, SOLUTION INTRAVENOUS at 10:03

## 2025-03-10 RX ADMIN — OXYCODONE 5 MG: 5 TABLET ORAL at 09:03

## 2025-03-10 RX ADMIN — OXYCODONE 5 MG: 5 TABLET ORAL at 11:03

## 2025-03-10 RX ADMIN — MIDAZOLAM HYDROCHLORIDE 2 MG: 2 INJECTION, SOLUTION INTRAMUSCULAR; INTRAVENOUS at 07:03

## 2025-03-10 RX ADMIN — TRANEXAMIC ACID 1000 MG: 100 INJECTION INTRAVENOUS at 08:03

## 2025-03-10 RX ADMIN — TRANEXAMIC ACID 2000 MG: 100 INJECTION INTRAVENOUS at 07:03

## 2025-03-10 RX ADMIN — VANCOMYCIN HYDROCHLORIDE 1500 MG: 1.5 INJECTION, POWDER, LYOPHILIZED, FOR SOLUTION INTRAVENOUS at 06:03

## 2025-03-10 RX ADMIN — PREGABALIN 75 MG: 75 CAPSULE ORAL at 05:03

## 2025-03-10 RX ADMIN — FENTANYL CITRATE 50 MCG: 50 INJECTION, SOLUTION INTRAMUSCULAR; INTRAVENOUS at 07:03

## 2025-03-10 RX ADMIN — HALOPERIDOL LACTATE 0.5 MG: 5 INJECTION, SOLUTION INTRAMUSCULAR at 12:03

## 2025-03-10 RX ADMIN — PROPOFOL 40 MG: 10 INJECTION, EMULSION INTRAVENOUS at 07:03

## 2025-03-10 RX ADMIN — MEPIVACAINE HYDROCHLORIDE 3.5 ML: 15 INJECTION, SOLUTION EPIDURAL; INFILTRATION at 07:03

## 2025-03-10 RX ADMIN — PROPOFOL 150 MCG/KG/MIN: 10 INJECTION, EMULSION INTRAVENOUS at 07:03

## 2025-03-10 RX ADMIN — FENTANYL CITRATE 100 MCG: 50 INJECTION INTRAMUSCULAR; INTRAVENOUS at 10:03

## 2025-03-10 RX ADMIN — SODIUM CHLORIDE: 9 INJECTION, SOLUTION INTRAVENOUS at 06:03

## 2025-03-10 RX ADMIN — SODIUM CHLORIDE, SODIUM GLUCONATE, SODIUM ACETATE, POTASSIUM CHLORIDE, MAGNESIUM CHLORIDE, SODIUM PHOSPHATE, DIBASIC, AND POTASSIUM PHOSPHATE: .53; .5; .37; .037; .03; .012; .00082 INJECTION, SOLUTION INTRAVENOUS at 08:03

## 2025-03-10 RX ADMIN — ACETAMINOPHEN 1000 MG: 500 TABLET ORAL at 11:03

## 2025-03-10 RX ADMIN — SODIUM CHLORIDE: 9 INJECTION, SOLUTION INTRAVENOUS at 08:03

## 2025-03-10 RX ADMIN — MUPIROCIN 1 G: 20 OINTMENT TOPICAL at 05:03

## 2025-03-10 RX ADMIN — DEXAMETHASONE SODIUM PHOSPHATE 8 MG: 4 INJECTION, SOLUTION INTRAMUSCULAR; INTRAVENOUS at 07:03

## 2025-03-10 RX ADMIN — ONDANSETRON 4 MG: 2 INJECTION INTRAMUSCULAR; INTRAVENOUS at 08:03

## 2025-03-10 RX ADMIN — SODIUM CHLORIDE: 0.9 INJECTION, SOLUTION INTRAVENOUS at 06:03

## 2025-03-10 NOTE — ANESTHESIA PROCEDURE NOTES
Spinal    Diagnosis: left knee pain  Patient location during procedure: OR  Start time: 3/10/2025 7:08 AM  Timeout: 3/10/2025 7:06 AM  End time: 3/10/2025 7:10 AM    Staffing  Authorizing Provider: Brandy Russell MD  Performing Provider: Brandy Russell MD    Staffing  Performed by: Brandy Russell MD  Authorized by: Brandy Russell MD    Preanesthetic Checklist  Completed: patient identified, IV checked, site marked, risks and benefits discussed, surgical consent, monitors and equipment checked, pre-op evaluation and timeout performed  Spinal Block  Patient position: sitting  Prep: ChloraPrep  Patient monitoring: cardiac monitor, heart rate, continuous pulse ox, continuous capnometry and frequent blood pressure checks  Approach: midline  Location: L3-4  Injection technique: single shot  CSF Fluid: clear free-flowing CSF  Needle  Needle type: pencil-tip   Needle gauge: 25 G  Needle length: 3.5 in  Needle localization: anatomical landmarks  Assessment  Sensory level: T8   Dermatomal levels determined by pinch or prick  Ease of block: easy  Patient's tolerance of the procedure: comfortable throughout block  Medications:    Medications: mepivacaine (CARBOCAINE) injection 15 mg/mL (1.5%) - Other   3.5 mL - 3/10/2025 7:09:00 AM

## 2025-03-10 NOTE — PT/OT/SLP EVAL
"Occupational Therapy   Evaluation and Discharge Note    Name: Shiloh Thayer  MRN: [id number]  Admitting Diagnosis: Primary osteoarthritis of left knee  Recent Surgery: Procedure(s) (LRB):  ARTHROPLASTY, KNEE, TOTAL, USING [employer] COMPUTER-ASSISTED NAVIGATION: LEFT: DEPUY - ATTUNE: SAME DAY (Left) * Day of Surgery *    Recommendations:     Discharge Recommendations: Low Intensity Therapy  Discharge Equipment Recommendations:  bedside commode  Barriers to discharge:  None    Assessment:     Shiloh Thayer is a 59 y.o. female with a medical diagnosis of Primary osteoarthritis of left knee.  She presents with L TKA. Performance deficits affecting function: impaired self care skills, impaired functional mobility, orthopedic precautions.  Pt was able to perform Sit <> Stand Transfer with modified independence with rolling walker Bed <> Chair Transfer using Stand Pivot technique with modified independence with rolling walker Toilet Transfer Stand Pivot technique with modified independence with rolling walker.  Able to perform UB/LB dressing c modified independence  Educated pt on bathing, car transfers, and cryotherapy.       Rehab Prognosis: Good; patient would benefit from acute skilled OT services to address these deficits and reach maximum level of function.       Plan:     Patient to be seen daily to address the above listed problems via self-care/home management, therapeutic activities, therapeutic exercises  Plan of Care Expires: 03/10/25  Plan of Care Reviewed with: patient, spouse    Subjective     Chief Complaint: R TKA  Patient/Family Comments/goals: "I want to go home."    Occupational Profile:  Living Environment: Pt lives in a 2 story house and lives on the first floor.  Has a walk-in shower.  Previous level of function: I PTA  Roles and Routines:   Equipment Used at Home: walker, rolling  Assistance upon Discharge: Pt lives c her .    Pain/Comfort:  Pain Rating 1: 5/10    Patients cultural, " spiritual, Mandaen conflicts given the current situation: no    Objective:     Communicated with: RN prior to session.  Patient found supine with  (No lines) upon OT entry to room.    General Precautions: Standard, fall  Orthopedic Precautions: LLE weight bearing as tolerated  Braces: N/A  Respiratory Status: Room air    Occupational Performance:    Bed Mobility:    Patient completed Supine to Sit with supervision    Functional Mobility/Transfers:  Patient completed Sit <> Stand Transfer with contact guard assistance  with  rolling walker   Patient completed Bed <> Chair Transfer using Stand Pivot technique with contact guard assistance with rolling walker  Patient completed Toilet Transfer Stand Pivot technique with contact guard assistance with  rolling walker and bedside commode  Functional Mobility: Pt was able to walk from bed to bathroom and then back to chair c CGA and RW.  Pt was very drowsy throughout assessment.    Activities of Daily Living:  Grooming: modified independence to wash hands while standing at sink c RW.  Upper Body Dressing: modified independence to don dress.  Lower Body Dressing: modified independence to don underwear, socks, and shoes.  Toileting: modified independence for toilet hygiene.    Physical Exam:  Upper Extremity Range of Motion:     -       Right Upper Extremity: WFL  -       Left Upper Extremity: WFL  Upper Extremity Strength:    -       Right Upper Extremity: WFL  -       Left Upper Extremity: WFL    AMPAC 6 Click ADL:  AMPAC Total Score: 24      Patient left up in chair with all lines intact, call button in reach, RN notified, and  present    GOALS:   Multidisciplinary Problems       Occupational Therapy Goals       Not on file              Multidisciplinary Problems (Resolved)          Problem: Occupational Therapy    Goal Priority Disciplines Outcome Interventions   Occupational Therapy Goal   (Resolved)     OT, PT/OT Met    Description: Goals to be met by: 3/10/25      Patient will increase functional independence with ADLs by performing:    UE Dressing with Modified [place].  LE Dressing with Modified [place] and Assistive Devices as needed.  Grooming while standing at sink with Modified [place].  Toileting from bedside commode with Modified [place] for hygiene and clothing management.   Bathing from  shower chair/bench with Modified [place].  Toilet transfer to bedside commode with Modified [place].                         DME Justifications:   Shiloh requires a commode for home use because she is confined to one level of the home environment and there is no toilet on that level.    History:     Past Medical History:   Diagnosis Date    Arthritis     Back pain 2024    Class 2 severe obesity due to excess calories with serious comorbidity and body mass index (BMI) of 37.0 to 37.9 in adult 2024    Glaucoma     Hyperlipidemia     Hypertension     Insomnia 2024    Obesity, Class I, BMI 30.0-34.9 (see actual BMI) 2019    Prediabetes     Sleep apnea          Past Surgical History:   Procedure Laterality Date     SECTION      CHOLECYSTECTOMY      COLONOSCOPY N/A 2024    Procedure: COLONOSCOPY;  Surgeon: Jarred Olivarez MD;  Location: [facility] ENDOSCOPY;  Service: Endoscopy;  Laterality: N/A;  24- Rx reordered, lvm and portal msg for pc. DBM  24- pt returned call, pc complete. DBM  24- r/s to later date, instr to portal. DB   pt r/s- precall complete- suflave inst portal-RB    HYSTERECTOMY         Time Tracking:     OT Date of Treatment: 03/10/25  OT Start Time: 1324  OT Stop Time: 1424  OT Total Time (min): 60 min    Billable Minutes:Evaluation 15  Self Care/Home Management 30  Therapeutic Activity 15    3/10/2025

## 2025-03-10 NOTE — TRANSFER OF CARE
"Anesthesia Transfer of Care Note    Patient: Shiloh Thayer    Procedure(s) Performed: Procedure(s) (LRB):  ARTHROPLASTY, KNEE, TOTAL, USING [employer] COMPUTER-ASSISTED NAVIGATION: LEFT: DEPUY - ATTUNE: SAME DAY (Left)    Patient location: PACU    Anesthesia Type: spinal    Transport from OR: Transported from OR on room air with adequate spontaneous ventilation    Post pain: adequate analgesia    Post assessment: no apparent anesthetic complications    Post vital signs: stable    Level of consciousness: awake    Nausea/Vomiting: no nausea/vomiting    Complications: none    Transfer of care protocol was followed      Last vitals: Visit Vitals  /79 (BP Location: Right arm, Patient Position: Lying)   Pulse (!) 55   Temp 36.7 °C (98.1 °F) (Oral)   Resp 18   Ht 5' 8" (1.727 m)   Wt 104.3 kg (230 lb)   LMP  (LMP Unknown)   SpO2 100%   Breastfeeding No   BMI 34.97 kg/m²     "

## 2025-03-10 NOTE — PT/OT/SLP EVAL
"Physical Therapy Evaluation, Treatment, and Discharge Note    Patient Name:  Shiloh Thayer   MRN:  [id number]    Recommendations:     Discharge Recommendations: Low Intensity Therapy  Discharge Equipment Recommendations: bedside commode   Barriers to discharge: None    Assessment:     Shiloh Thayer is a 59 y.o. female admitted with a medical diagnosis of Primary osteoarthritis of left knee. Patient tolerated PT session well. During 1st session, patient ambulated 44ft with RW and contact guard assistance. She was assisted back into bed due to nausea. During 2nd session, patient ambulated 20ft with RW and contact guard assistance. Patient ascended/descended 4" step with RW and contact guard assistance. Patient ready to discharge home from PT standpoint.     Recent Surgery: Procedure(s) (LRB):  ARTHROPLASTY, KNEE, TOTAL, USING [employer] COMPUTER-ASSISTED NAVIGATION: LEFT: DEPUY - ATTUNE: SAME DAY (Left) * Day of Surgery *    Plan:     During this hospitalization, patient does not require further acute PT services.  Please re-consult if situation changes.      Subjective     Chief Complaint: Sleepy.   Patient/Family Comments/goals: To be able to dance.  Pain/Comfort:  Pain Rating 1: 7/10  Location - Side 1: Left  Location 1: knee  Pain Addressed 1: Pre-medicate for activity, Reposition, Distraction    Patients cultural, spiritual, Episcopal conflicts given the current situation:  n/a    Living Environment:  Patient lives with her  in a 2SH with 1 step to enter. Her bedroom and bathroom are on the 1st floor.   Prior to admission, patients level of function was independent for functional mobility. Equipment at home: walker, rolling. Upon discharge, patient will have assistance from .    Objective:     Communicated with RN prior to session. Patient found supine for 1st session and seated in wheelchair for 2nd session with cryotherapy upon PT entry to room.    General Precautions: Standard, fall    Orthopedic " "Precautions:LLE weight bearing as tolerated   Braces: N/A  Respiratory Status: Room air    Exams:  Cognitive Exam:  Patient is oriented to Person, Place, Time, and Situation  Gross Motor Coordination:  WFL  RLE ROM: WFL  RLE Strength: WFL  LLE ROM: WFL except limited at knee due to pain  LLE Strength: appears WFL but did not formally assess due to pain and recent surgery    1st Functional Mobility:  Bed Mobility:     Supine to Sit: contact guard assistance  Sit to Supine: contact guard assistance  Transfers:     Sit to Stand:  contact guard assistance with rolling walker x1 from bed and x1 from wheelchair  Gait: Patient ambulated 44ft with Rolling Walker and contact guard assistance using 3-point gait. Patient demonstrated decreased cami and decreased step length during gait due to pain, decreased ROM, and decreased strength.     2nd Functional Mobility:  Transfers:     Sit to Stand:  contact guard assistance with rolling walker x1 from wheelchair   Gait: Patient ambulated 20ft with Rolling Walker and contact guard assistance using 3-point gait. Patient demonstrated decreased cami and decreased step length during gait due to pain, decreased ROM, and decreased strength.  Stairs:  Patient ascended/descended 4" step with RW and contact guard assistance.     Treatment and Education:  Patient and  educated in:  -PT role and POC  -safety with transfers including hand placement  -gait sequencing and RW management  -OOB activity to maximize recovery including ambulating at home to prevent DVT   -car transfer  -curb training  -HEP for therex at home with handout provided    AM-PAC 6 CLICK MOBILITY  Total Score:23     Patient left supine after 1st session and seated in wheelchair after 2nd session with all lines intact, call button in reach, and RN and family present.    GOALS:   Multidisciplinary Problems       Physical Therapy Goals       Not on file              Multidisciplinary Problems (Resolved)          " Problem: Physical Therapy    Goal Priority Disciplines Outcome Interventions   Physical Therapy Goal   (Resolved)     PT, PT/OT Met                      History:     Past Medical History:   Diagnosis Date    Arthritis     Back pain 2024    Class 2 severe obesity due to excess calories with serious comorbidity and body mass index (BMI) of 37.0 to 37.9 in adult 2024    Glaucoma     Hyperlipidemia     Hypertension     Insomnia 2024    Obesity, Class I, BMI 30.0-34.9 (see actual BMI) 2019    Prediabetes     Sleep apnea        Past Surgical History:   Procedure Laterality Date     SECTION      CHOLECYSTECTOMY      COLONOSCOPY N/A 2024    Procedure: COLONOSCOPY;  Surgeon: Jarred Olivarez MD;  Location: [facility] ENDOSCOPY;  Service: Endoscopy;  Laterality: N/A;  24- Rx reordered, lvm and portal msg for pc. DBM  24- pt returned call, pc complete. DBM  24- r/s to later date, instr to portal. DB   pt r/s- precall complete- suflave inst portal-RB    HYSTERECTOMY         Time Tracking:     PT Received On: 03/10/25  PT Start Time: 1148 (1433)     PT Stop Time: 1222 (1441)  PT Total Time (min): 34 min + 8 min = 42 min total     Billable Minutes: Evaluation 10, Gait Training 17, and Therapeutic Activity 15    03/10/2025

## 2025-03-10 NOTE — OP NOTE
Sam Left TKA  OPERATIVE NOTE    Date of Operation:   3/10/2025    Providers Performing:   Surgeon(s):  Dewayne Vargas III, MD  Assistant: Francisco Abrams PA-C, I certify that the services for which payment is claimed were medically necessary, and that no qualified resident was available to perform the services.      Operative Procedure:   Left Total Knee Arthroplasty, using Depuy [employer] robotic assisted solution, CPT 84225  Computer-assisted surgical navigational procedure for musculoskeletal procedures, image-less, CPT 52850  Surgical techniques requiring use of robotic surgical system, CPT     -22 modifier for CDC class 2 or higher obesity (BMI 35) requiring prolonged operative time and increased case complexity and difficulty      Preoperative Diagnosis:   Left Knee Osteoarthritis, ICD-10 M17.12    Postoperative Diagnosis:   SAME    Components Used:   Depuy  Femur: Attune PS porocoat Size 6  Tibia: Attune tibial base affixium fixed bearing Size 6  Patella: Attune medialized dome 35 mm, AOX  Tibial Insert: Attune fixed bearing PS size 6 mm, AOX  1/2 bag=20gm  Depuy CMW2 cement    Implant Name Type Inv. Item Serial No.  Lot No. LRB No. Used Action   CEMENT BONE CMW2 20G - [id number]  CEMENT BONE CMW2 20G  [employer] [id number] Left 1 Implanted   COMP ATTUNE POROCOAT POS L SZ6 - [id number]  COMP ATTUNE POROCOAT POS L SZ6  [employer] [id number] Left 1 Implanted   PATELLA ATTUNE MEDLZD DOME 35 - [id number]  PATELLA ATTUNE MEDLZD DOME 35  [employer] [id number] Left 1 Implanted   BASEPLATE ATTUNE FIX BEAR SZ6 - [id number]  BASEPLATE ATTUNE FIX BEAR SZ6  [employer] [id number] Left 1 Implanted   INSERT TIBIAL PS SZ 6 6MM - [id number]  INSERT TIBIAL PS SZ 6 6MM  [employer] [id number] Left 1 Implanted       Anesthesia:   Spinal    JYOTI cocktail: BERENICE    Estimated Blood Loss:   400 cc    Specimens:   None    Complications:   None    Indications:   The patient has failed non-operative measures including  medications, injections and activity modifications for their knee.  After an explanation of risks and benefits of knee arthroplasty surgery, the patient wishes to proceed with surgery.    Operative Notes:   The patient was greeted in the pre-op holding area.  The patients knee was marked with a surgical marker by the surgeon.  Anesthesia per above technique was administered by the anesthesia team.  The patient was placed in the supine position on the OR table with all bony prominences padded.  A tourniquet was not used.  IV antibiotics were administered.  The leg was prepped and draped in the usual sterile fashion.  A timeout was performed and the correct patient, site and procedure were identified.    A midline incision was made with a standard medial parapatellar arthrotomy.  The standard medial capsular release was performed along with the lateral gutter.  The patella was mobilized from the lateral parapatellar ligament and bony osteophytes were removed.  The intercondylar notch was cleared of the ACL and PCL.    The femoral and tibial guide pins where placed and the arrays were positioned and tightened to the pins. The hip was then brought through a range of motion and the hip center was identified, medial and lateral malleolus were identified and the tibia and femur were registered.     Using a tibia first with tensioner technique the joint was first tensioned manually in extension and flexion and through the full range of motion to define our gaps. Provisional cuts/implants were positioned virtually for appropriate size gaps and implant placement.    The [employer] robot was then brought into position and checkpoints were confirmed. Care was taken during the burring process to protect the soft tissue. We cut the tibia. The tensioner was then placed in the joint and the knee was again extension and flexion and through the full range of motion to define our gaps. The cuts/implants were positioned virtually for  appropriate size gaps and implant placement and the femoral cuts were then made.     The PS box was cut. Meniscal remnants were removed and the knee was brought into flexion and posterior osteophytes were removed.      At this time the trial implants were placed and knee assessed for stability, and flexion arc. The patella was prepared using free-hand technique and the three-holed lug drill guide was sized and appropriately assessed. Patella tracking was found to be acceptable. The tibial component rotation was marked. The trials and guide pins were removed. The tibial component was positioned and the keel was drilled and punched.    Cement was mixed on the back table and applied to the patella.  Implantation of the femoral, tibial components was performed sequentially with excellent primary stability, then the patella was cemented with removal of all excess cement. A trial insert was placed while the cement dried and a 0.35% betadine solution was left to soak in the surgical field.     After the cement was dry, the trial poly insert was removed. Hemostasis was obtained with bovie electrocautery.  The knee was copiously irrigated with pulsatile lavage. The final polyethylene insert was placed and the knee reduced.      1 gram of vancomycin powder was placed in the knee. The arthrotomy was closed with interrupted #1 vicryl suture and #2 quill, the subcuticular layer was closed with 2-0 vicryl suture and a running 4-0 monocryl was used to closed the skin surface.  Pin sites were closed with 4-0 monocryl and skin glue. Surgicel sealant was placed over the top of the incision and sterile dressing placed over the wound. Appropriately sized TEDs hose were placed for edema control.     Sponge and needle counts were correct.    The first-assistant was critical to all steps of the operation, including retraction and leg stabilization during exposure and bone preparation, as well as the deep and superficial wound closure.   Dr. Vargas performed and/or supervised the key portions of this surgical procedure, including evaluation of the bone cuts, reshaping of the bony elements, and insertion of the provisional and final components.    Postoperative Plan:  The patient will be anticoagulated with 81mg aspirin twice daily for one month.   They will receive 24 hours of post-operative antibiotics.    Activity will be weight bearing as tolerated.    Same day      Due patient and or surgical factors the patient will receive an Rx for cefadroxil 500mg BID x7 days after discharge per [place] data:   Kam MM, Gilmer NUGENT, Kendy VALERA, Reanna PEREZ. [facility] Clinical Research Award: Extended Oral Antibiotics Prevent Periprosthetic Joint Infection in High-Risk Cases: 3855 Patients With 1-Year Follow-Up. J Arthroplasty. 2021 Jul;36(7S):S18-S25. doi: 10.1016/j.arth.2021.01.051. Epub 2021 Jan 23. PMID: [id number].      Signed by: Dewayne Vargas III, MD

## 2025-03-10 NOTE — PROGRESS NOTES
"Pt cont to c/o 810 left knee pain, states "entire knee".  Verbal order from Dr Russell to give additional oxy 5mg x 1 now.   "

## 2025-03-10 NOTE — PROGRESS NOTES
Patient medicated for nausea, see MAR.  Assisted back to stretcher with SUPRIYA Marcelino.  Pure wick placed per pt request.

## 2025-03-10 NOTE — OPERATIVE NOTE ADDENDUM
Certification of Assistant at Surgery       Surgery Date: 3/10/2025     Participating Surgeons:  Surgeons and Role:     * Dewayne Vargas III, MD - Primary    Procedures:  Procedure(s) (LRB):  ARTHROPLASTY, KNEE, TOTAL, USING [employer] COMPUTER-ASSISTED NAVIGATION: LEFT: DEPUY - ATTUNE: SAME DAY (Left)    Assistant Surgeon's Certification of Necessity:  I understand that section 1842 (b) (6) (d) of the Social Security Act generally prohibits Medicare Part B reasonable charge payment for the services of assistants at surgery in teaching hospitals when qualified residents are available to furnish such services. I certify that the services for which payment is claimed were medically necessary, and that no qualified resident was available to perform the services. I further understand that these services are subject to post-payment review by the Medicare carrier.      Francisco Abrams PA-C    03/10/2025  9:14 AM

## 2025-03-10 NOTE — PLAN OF CARE
"Patient tolerated PT session well. During 1st session, patient ambulated 44ft with RW and contact guard assistance. She was assisted back into bed due to nausea. During 2nd session, patient ambulated 20ft with RW and contact guard assistance. Patient ascended/descended 4" step with RW and contact guard assistance. Patient ready to discharge home from PT standpoint.      Problem: Physical Therapy  Goal: Physical Therapy Goal  Outcome: Met     "

## 2025-03-10 NOTE — PLAN OF CARE
Patient is AAO and VSS.  Tolerating PO and states pain is tolerable.  Dressing CDI.  Patient states they are ready for d/c.  IV removed.  Catheter tip intact.  Caregiver at bedside.  Discharge instructions reviewed and copy given to the patient and caregiver.  Questions answered.  Both verbalized understanding.  Paper rx given Medication delivered to bedside. pt has own rolling walker at home no DME needed.  Patient wheeled to car by PCT.

## 2025-03-10 NOTE — PLAN OF CARE
Pre Op complete with no distress noted.  Belongings given to  and patient has walker in car.  Call light in reach,  at bedside and no questions at this time.

## 2025-03-10 NOTE — DISCHARGE SUMMARY
[place] - Surgery (Hospital)  Discharge Note  Short Stay    Procedure(s) (LRB):  ARTHROPLASTY, KNEE, TOTAL, USING [employer] COMPUTER-ASSISTED NAVIGATION: LEFT: DEPUY - ATTUNE: SAME DAY (Left)      OUTCOME: Patient tolerated treatment/procedure well without complication and is now ready for discharge.    DISPOSITION: Home or Self Care    FINAL DIAGNOSIS:  Primary osteoarthritis of left knee    FOLLOWUP: In clinic    DISCHARGE INSTRUCTIONS:    Discharge Procedure Orders   CBC Auto Differential   Standing Status: Future Number of Occurrences: 1 Standing Exp. Date: 04/06/26     Comprehensive Metabolic Panel   Standing Status: Future Number of Occurrences: 1 Standing Exp. Date: 04/06/26     Protime-INR   Standing Status: Future Number of Occurrences: 1 Standing Exp. Date: 04/06/26     Activity as tolerated     Sponge bath only until clinic visit     Keep surgical extremity elevated when not ambulating     Lifting restrictions   Order Comments: No strenuous exercise or lifting of > 10 lbs     Weight bearing as tolerated     No driving, operating heavy equipment or signing legal documents while taking pain medication     Leave dressing on - Keep it clean, dry, and intact until clinic visit   Order Comments: Do not remove surgical dressing for 2 weeks post-op. This will be done only by MD at initial post-op visit. If dressing is completely saturated, replace with identical dressing - silver-impregnated hydrocolloid dressing.     Do not get dressings wet. Do not shower.     If dressing continues to be saturated or there are signs of infection, please call [facility] Clinic [phone] for further instructions and to make appt to be seen.     On POD1 remove ace wrap and cotton padding. Leave Aquacel bandage on until 2  week post op visit in clinic     Call MD for:  temperature >100.4     Call MD for:  persistent nausea and vomiting     Call MD for:  severe uncontrolled pain     Call MD for:  difficulty breathing, headache or visual  disturbances     Call MD for:  redness, tenderness, or signs of infection (pain, swelling, redness, odor or green/yellow discharge around incision site)     Call MD for:  hives     Call MD for:  persistent dizziness or light-headedness     Call MD for:  extreme fatigue     EKG 12-lead   Standing Status: Future Number of Occurrences: 1 Standing Exp. Date: 02/05/26        TIME SPENT ON DISCHARGE: 5 minutes

## 2025-03-10 NOTE — ANESTHESIA PREPROCEDURE EVALUATION
03/10/2025  Shiloh Thayer is a 59 y.o., female with HLD, HTN, Obesity, and JACKIE    Pre-operative evaluation for Procedure(s) (LRB):  ARTHROPLASTY, KNEE, TOTAL, USING [employer] COMPUTER-ASSISTED NAVIGATION: LEFT: DEPUY - ATTUNE: SAME DAY (Left)        Problem List[1]    Review of patient's allergies indicates:  No Known Allergies    Medications Ordered Prior to Encounter[2]    Past Surgical History:   Procedure Laterality Date     SECTION      CHOLECYSTECTOMY      COLONOSCOPY N/A 2024    Procedure: COLONOSCOPY;  Surgeon: Jarred Oilvarez MD;  Location: [facility] ENDOSCOPY;  Service: Endoscopy;  Laterality: N/A;  24- Rx reordered, lvm and portal msg for pc. DBM  24- pt returned call, pc complete. DBM  24- r/s to later date, instr to portal. DBM   pt r/s- precall complete- suflave inst portal-RB    HYSTERECTOMY           CBC:  Lab Results   Component Value Date    WBC 4.48 2025    RBC 4.31 2025    HGB 12.5 2025    HCT 39.3 2025     2025    MCV 91 2025    MCH 29.0 2025    MCHC 31.8 (L) 2025       CMP:   Lab Results   Component Value Date     2025    K 4.1 2025     2025    CO2 28 2025    BUN 13 2025    CREATININE 0.7 2025     2025    CALCIUM 9.5 2025    ALBUMIN 3.7 2025    PROT 7.7 2025    ALKPHOS 63 2025    ALT 11 2025    AST 19 2025    BILITOT 0.3 2025       INR:  Lab Results   Component Value Date    INR 0.9 2025         Diagnostic Studies:      EKG:   Results for orders placed or performed during the hospital encounter of 25   EKG 12-lead    Collection Time: 25  9:25 AM   Result Value Ref Range    QRS Duration 74 ms    OHS QTC Calculation 424 ms    Narrative    Test Reason : Z01.818,    Vent. Rate :  52 BPM    "  Atrial Rate :  52 BPM     P-R Int : 180 ms          QRS Dur :  74 ms      QT Int : 456 ms       P-R-T Axes :  10  36  31 degrees    QTcB Int : 424 ms    Sinus bradycardia  Otherwise normal ECG  When compared with ECG of 31-Oct-2020 15:40,  No significant change was found  Confirmed by Jesus Lang ([id number]) on 2/14/2025 11:11:57 AM    Referred By: BLADIMIR ABARCA           Confirmed By: Jesus Lang        Pre-op Vitals [03/10/25 0548]   BP Pulse Resp Temp SpO2   115/79 (!) 56 18 36.7 °C (98.1 °F) 100 %      Height Weight BMI (Calculated)     5' 8" 230 lb 35            Pre-op Assessment    I have reviewed the Patient Summary Reports.     I have reviewed the Nursing Notes. I have reviewed the NPO Status.      Review of Systems  Anesthesia Hx:  No problems with previous Anesthesia                Social:  No Alcohol Use, Non-Smoker       Cardiovascular:  Exercise tolerance: good   Hypertension           hyperlipidemia   ECG has been reviewed.                            Pulmonary:        Sleep Apnea                Musculoskeletal:  Arthritis               Endocrine:        Obesity / BMI > 30      Physical Exam  General: Well nourished and Cooperative    Airway:  Mallampati: II   Mouth Opening: Normal  TM Distance: Normal  Tongue: Normal    Chest/Lungs:  Normal Respiratory Rate    Heart:  Rate: Bradycardia  Rhythm: Regular Rhythm              Anesthesia Plan  Type of Anesthesia, risks & benefits discussed:    Anesthesia Type: Gen ETT, CSE, Spinal  Intra-op Monitoring Plan: Standard ASA Monitors  Post Op Pain Control Plan: multimodal analgesia and IV/PO Opioids PRN  Induction:  IV  Informed Consent: Informed consent signed with the Patient and all parties understand the risks and agree with anesthesia plan.  All questions answered.   ASA Score: 2    Ready For Surgery From Anesthesia Perspective.     .       [1]   Patient Active Problem List  Diagnosis    Hypertension    Hyperlipidemia    Chronic pain of both knees    Weakness of " both lower extremities    Decreased range of motion of both knees    Gait difficulty    Primary osteoarthritis of both knees    Other specified glaucoma    Left knee pain    BMI 35.0-35.9,adult    Primary osteoarthritis of left knee   [2]   No current facility-administered medications on file prior to encounter.     Current Outpatient Medications on File Prior to Encounter   Medication Sig Dispense Refill    atorvastatin (LIPITOR) 20 MG tablet TAKE 1 TABLET BY MOUTH EVERY DAY 90 tablet 3    latanoprost 0.005 % ophthalmic solution       multivitamin (THERAGRAN) per tablet Take 1 tablet by mouth once daily.      timolol maleate 0.5% (TIMOPTIC) 0.5 % Drop Place 1 drop into both eyes 2 (two) times daily.

## 2025-03-10 NOTE — PLAN OF CARE
Problem: Occupational Therapy  Goal: Occupational Therapy Goal  Description: Goals to be met by: 3/10/25     Patient will increase functional independence with ADLs by performing:    UE Dressing with Modified [place].  LE Dressing with Modified [place] and Assistive Devices as needed.  Grooming while standing at sink with Modified [place].  Toileting from bedside commode with Modified [place] for hygiene and clothing management.   Bathing from  shower chair/bench with Modified [place].  Toilet transfer to bedside commode with Modified [place].    Outcome: Met

## 2025-03-11 NOTE — ANESTHESIA POSTPROCEDURE EVALUATION
Anesthesia Post Evaluation    Patient: Shiloh Thayer    Procedure(s) Performed: Procedure(s) (LRB):  ARTHROPLASTY, KNEE, TOTAL, USING [employer] COMPUTER-ASSISTED NAVIGATION: LEFT: DEPUY - ATTUNE: SAME DAY (Left)    Final Anesthesia Type: spinal      Patient location during evaluation: PACU  Patient participation: Yes- Able to Participate  Level of consciousness: awake and alert  Post-procedure vital signs: reviewed and stable  Pain management: adequate  Airway patency: patent  JACKIE mitigation strategies: Multimodal analgesia  PONV status at discharge: nausea (controlled)  Anesthetic complications: no      Cardiovascular status: blood pressure returned to baseline  Respiratory status: unassisted  Hydration status: euvolemic  Follow-up not needed.              Vitals Value Taken Time   /58 03/10/25 13:31   Temp 36.8 °C (98.2 °F) 03/10/25 14:10   Pulse 60 03/10/25 12:00   Resp 23 03/10/25 11:56   SpO2 100 % 03/10/25 12:00   Vitals shown include unfiled device data.      Event Time   Out of Recovery 11:00:00         Pain/Rob Score: Pain Rating Prior to Med Admin: 8 (3/10/2025 11:40 AM)  Rob Score: 10 (3/10/2025  9:40 AM)

## 2025-03-12 ENCOUNTER — CLINICAL SUPPORT (OUTPATIENT)
Dept: REHABILITATION | Facility: HOSPITAL | Age: 60
End: 2025-03-12
Attending: ORTHOPAEDIC SURGERY
Payer: OTHER GOVERNMENT

## 2025-03-12 ENCOUNTER — CLINICAL SUPPORT (OUTPATIENT)
Dept: ORTHOPEDICS | Facility: CLINIC | Age: 60
End: 2025-03-12
Payer: OTHER GOVERNMENT

## 2025-03-12 ENCOUNTER — TELEPHONE (OUTPATIENT)
Dept: ORTHOPEDICS | Facility: CLINIC | Age: 60
End: 2025-03-12
Payer: OTHER GOVERNMENT

## 2025-03-12 DIAGNOSIS — Z96.652 STATUS POST TOTAL KNEE REPLACEMENT, LEFT: Primary | ICD-10-CM

## 2025-03-12 DIAGNOSIS — M17.12 PRIMARY OSTEOARTHRITIS OF LEFT KNEE: Primary | ICD-10-CM

## 2025-03-12 PROCEDURE — 97161 PT EVAL LOW COMPLEX 20 MIN: CPT

## 2025-03-12 PROCEDURE — 97112 NEUROMUSCULAR REEDUCATION: CPT

## 2025-03-12 NOTE — TELEPHONE ENCOUNTER
Attempted to contact patient for post of virtual call, no answer on  phone, VM left, will conduct questionnaire when patient calls back

## 2025-03-12 NOTE — PROGRESS NOTES
I called the patient today regarding her  surgery with Dr. Vargas. The patient had a left TKA on 3/10/25.     Pain Scale: 5 / 10    Any issues with Fever: No.    Any issues with medications (specifically DVT prophylaxis): No.    Pain medication: no;  Celebrex : no  Protonix : no  Resume home meds : Yes    Any issues with:   Bowel movements: No.  Passing cristiana: No.  Urination: No.    Completing at home exercises: Yes    Any concerns regarding their dressing/bandage:  No.    Patient confirmed first OP-PT appointment:  yes on  3/14/25 at 0800.     Any other concerns: No.    Blue Bracelet : yes    The patient was informed that if they have any urgent issues with their bandage, medications or any other health concerns regarding their surgery to call the 24/7 Orthopedic Post-op Hot Line at [phone]. The patient was reminded that if they have any chest pain or shortness of breath to call 911 or go to the ER.    The patient verbalized understanding and does not have any other questions

## 2025-03-14 ENCOUNTER — CLINICAL SUPPORT (OUTPATIENT)
Dept: REHABILITATION | Facility: HOSPITAL | Age: 60
End: 2025-03-14
Payer: OTHER GOVERNMENT

## 2025-03-14 DIAGNOSIS — Z96.652 STATUS POST TOTAL KNEE REPLACEMENT, LEFT: Primary | ICD-10-CM

## 2025-03-14 PROCEDURE — 97014 ELECTRIC STIMULATION THERAPY: CPT

## 2025-03-14 PROCEDURE — 97112 NEUROMUSCULAR REEDUCATION: CPT

## 2025-03-14 NOTE — PROGRESS NOTES
Outpatient Rehab    Physical Therapy Evaluation    Patient Name: Shiloh Thayer  MRN: [id number]  YOB: 1965  Encounter Date: 3/12/2025    Therapy Diagnosis:   Encounter Diagnosis   Name Primary?    Status post total knee replacement, left Yes     Physician: Dewayne Vargas III, *    Physician Orders: Eval and Treat  Medical Diagnosis: Primary osteoarthritis of left knee [M17.12]     Visit # / Visits Authorized:  1 / 1   Date of Evaluation:  3/12/2025   Insurance Authorization Period: 11/14/2024 to 11/14/2025  Plan of Care Certification:  3/12/2025 to 4/30/2025     Time In: 1240   Time Out: 1330  Total Time: 50 minutes  Total Billable Time: 50 minutes    Intake Outcome Measure for FOTO Survey    Therapist reviewed FOTO scores for Shiloh Thayer on 3/12/2025.   FOTO report - see Media section or FOTO account episode details.     Intake Score:  See media section.          Subjective   History of Present Illness  Shiloh is a 59 y.o. female who reports to physical therapy with a chief concern of left knee pain post op.     The patient reports a medical diagnosis of Primary osteoarthritis of left knee (M17.12).            History of Present Condition/Illness: Shiloh presents to physical therapy with reports of left knee pain status post left total knee arthroplasty. She reports beginning pain meds yesterday and still has increased pain. Reports some nausea secondary to pain meds. She has been icing and elevating her knee at home. She has attempted to perform her exercises and getting up with rolling walker.     Activities of Daily Living  Social history was obtained from Patient.               Previously independent with activities of daily living? Yes     Currently independent with activities of daily living? No  Activities currently needing assistance include Functional mobility.            Pain     Patient reports a current pain level of 7/10. Pain at best is reported as 7/10. Pain at worst is  reported as 9/10.   Clinical Progression (since onset): Stable  Pain Qualities: Aching, Tenderness  Pain-Relieving Factors: Rest, Medications - prescription, Ice, Change in position  Pain-Aggravating Factors: Bending, Walking           Past Medical History/Physical Systems Review:   Shiloh Thayer  has a past medical history of Arthritis, Back pain, Class 2 severe obesity due to excess calories with serious comorbidity and body mass index (BMI) of 37.0 to 37.9 in adult, Glaucoma, Hyperlipidemia, Hypertension, Insomnia, Obesity, Class I, BMI 30.0-34.9 (see actual BMI), Prediabetes, and Sleep apnea.    Shiloh Thayer  has a past surgical history that includes Cholecystectomy; Hysterectomy;  section (); Colonoscopy (N/A, 2024); and arthroplasty, knee, total, using computer-assisted navigation (Left, 3/10/2025).    Shiloh has a current medication list which includes the following prescription(s): acetaminophen, amlodipine, aspirin, atorvastatin, cefadroxil, celecoxib, hydrochlorothiazide, latanoprost, methocarbamol, multivitamin, oxycodone, pantoprazole, polyethylene glycol, and timolol maleate 0.5%.    Review of patient's allergies indicates:  No Known Allergies     Objective   Knee Observations  Left Knee Observations  Present: Ecchymosis, Edema, Incision, and Straight Leg Raise Extensor Lag  Left Knee Incision Observations  Present: Clean  Not Present: Dry and Drainage             Knee Range of Motion   Left Knee   Active (deg) Passive (deg) Pain   Flexion 59   Yes   Extension 1 (lacking)   Yes                      Knee Strength   Right Strength Right Pain Left Strength Left  Pain   Flexion (S2)           Prone Flexion           Extension (L3)             Knee Extensor Lag  Lag Present: Left          MMT N/T secondary to post op.     Treatment:  Balance/Neuromuscular Re-Education  Balance/Neuromuscular Re-Education Activity 1: Quad sets with NMES for motor control (Jordanian protocol), 8 minutes,  10 sec on/30 sec off  Balance/Neuromuscular Re-Education Activity 2: Heel prop: 2 minutes; heel slides 5 minutes  Balance/Neuromuscular Re-Education Activity 3: Ambulation with RW lobby > clinic: cues for proper gait mechanics and full knee extension: 10 minutes  Balance/Neuromuscular Re-Education Activity 4: Education on home exercise program, ROM expecations, swelling/pain management    Modalities  Moist Heat (min): ice pack on left knee with BLE elevated with wedge: 10 minutes    Assessment & Plan   Assessment  Shiloh presents with a condition of Low complexity.   Presentation of Symptoms: Stable  Will Comorbidities Impact Care: No       Functional Limitations: Activity tolerance, Ambulating on uneven surfaces, Completing self-care activities, Functional mobility, Functional cognition, Pain with ADLs/IADLs, Performing household chores, Transfers, Standing tolerance, Sitting tolerance  Impairments: Activity intolerance, Impaired physical strength, Impaired balance    Patient Goal for Therapy (PT): to return to prior leve lof function with out pain  Prognosis: Good  Assessment Details: Shiloh is 2 days status post left total knee arthroplasty. She presents to physical therapy with rolling walker, TERRIE hose donned. She ambulates with decreased left knee extension, forward lean, and antalgic gait. She is able to achieve 1-59 degrees AAROM today. Patient educated on elevation and icing regimen for swelling and pain management. She demonstrates decreased left range of motion, decreased left lower extremity strength, decreased quad motor control, and decreased activity tolerance. Swelling and bruising as expected at this point post op. She will benefit from skilled physical therapy to address the above deficits.     Plan  From a physical therapy perspective, the patient would benefit from: Skilled Rehab Services    Planned therapy interventions include: Therapeutic exercise, Therapeutic activities, Neuromuscular  re-education, Manual therapy, Gait training, and Aquatic therapy.    Planned modalities to include: Cryotherapy (cold pack), Electrical stimulation - passive/unattended, and Thermotherapy (hot pack).        Visit Frequency: 3 times Per Week for 6 Weeks.       This plan was discussed with Patient and Family.   Discussion participants: Agreed Upon Plan of Care             Patient's spiritual, cultural, and educational needs considered and patient agreeable to plan of care and goals.           Goals:   Active       Functional outcome       Patient will show a significant change in FOTO patient-reported outcome tool to demonstrate subjective improvement (Progressing)       Start:  03/14/25    Expected End:  05/02/25            Patient stated goal: to return to prior leve lof function with out pain  (Progressing)       Start:  03/14/25    Expected End:  05/02/25            Patient will demonstrate independence in home program for support of progression (Progressing)       Start:  03/14/25    Expected End:  04/18/25               Pain       Patient will report a 2 point reduction in pain while performing stair climbing to demonstrate improved activity tolerance (Progressing)       Start:  03/14/25    Expected End:  04/18/25               Range of Motion       Patient will achieve left knee ROM of  0-120 degrees  (Progressing)       Start:  03/14/25    Expected End:  04/18/25                Rere Knight, PT

## 2025-03-14 NOTE — PROGRESS NOTES
Outpatient Rehab    Physical Therapy Visit    Patient Name: Shiloh Thayer  MRN: [id number]  YOB: 1965  Encounter Date: 3/14/2025    Therapy Diagnosis:   Encounter Diagnosis   Name Primary?    Status post total knee replacement, left Yes     Physician: Dewayne Vargas III, *    Physician Orders: Eval and Treat  Medical Diagnosis: Primary osteoarthritis of left knee [M17.12]      Visit # / Visits Authorized:  1 / 20   Date of Evaluation:  3/12/2025   Insurance Authorization Period: 1/27/2025 to 1/27/2026  Plan of Care Certification:  3/12/2025 to 4/30/2025     Time In: 0705   Time Out: 0810  Total Time: 65   Total Billable Time: 55 minutes    FOTO: See media section.        Subjective   Patient reported pain initially at a 6/10. At end of session, hermann reported 8/10 pain..  Family / care giver present for this visit:   Pain reported as 8/10.      Objective       Knee Swelling  Location of Measurement Right  (cm) Left  (cm)   20 cm Above Joint Line   54.8   10 cm Vastus Medialis Oblique       At Joint Line   48.8   15 cm Below Joint Line   42             Knee Range of Motion   Left Knee   Active (deg) Passive (deg) Pain   Flexion 65   Yes   Extension 1   Yes                   Treatment:  Balance/Neuromuscular Re-Education  Balance/Neuromuscular Re-Education Activity 1: NMES Monegasque Protocol (10 sec on /30 sec off): quad sets with towel under knee, 10 minutes - SAQs with medium bolster and green strap, 10 minutes - SAQs with half bolster and green strap, 10 minutes  Balance/Neuromuscular Re-Education Activity 2: Heel prop for tissue extensibility: 2 minutes; heel slides 5 minutes, 10 sec hold  Balance/Neuromuscular Re-Education Activity 4: Education on home exercise program, ROM expecations, swelling/pain management    Modalities  Moist Heat (min): ice pack on left knee with BLE elevated with wedge: 10 minutes    Assessment & Plan   Assessment: Shiloh is 4 days s/p L TKA. She presents to physical  therapy with L TERRIE hose donned and rolling walker. Swelling and bruising are as expected at this point post op. She reported increased left knee pain throughout session. PT provided education on managing pain and on normal expectations of pain post surgery. She tolerated exercises with NMES for quad motor control. Able to demonstrate fair quad activation. Will continue to progress as tolerated.       Patient will continue to benefit from skilled outpatient physical therapy to address the deficits listed in the problem list box on initial evaluation, provide pt/family education and to maximize pt's level of independence in the home and community environment.     Patient's spiritual, cultural, and educational needs considered and patient agreeable to plan of care and goals.           Plan: Continue to progress toward patient's goals.    Goals:   Active       Functional outcome       Patient will show a significant change in FOTO patient-reported outcome tool to demonstrate subjective improvement (Progressing)       Start:  03/14/25    Expected End:  05/02/25            Patient stated goal: to return to prior leve lof function with out pain  (Progressing)       Start:  03/14/25    Expected End:  05/02/25            Patient will demonstrate independence in home program for support of progression (Progressing)       Start:  03/14/25    Expected End:  04/18/25               Pain       Patient will report a 2 point reduction in pain while performing stair climbing to demonstrate improved activity tolerance (Progressing)       Start:  03/14/25    Expected End:  04/18/25               Range of Motion       Patient will achieve left knee ROM of  0-120 degrees  (Progressing)       Start:  03/14/25    Expected End:  04/18/25                Rere Knight, PT, DPT

## 2025-03-17 ENCOUNTER — CLINICAL SUPPORT (OUTPATIENT)
Dept: REHABILITATION | Facility: HOSPITAL | Age: 60
End: 2025-03-17
Payer: OTHER GOVERNMENT

## 2025-03-17 DIAGNOSIS — Z96.652 STATUS POST TOTAL KNEE REPLACEMENT, LEFT: Primary | ICD-10-CM

## 2025-03-17 PROCEDURE — 97112 NEUROMUSCULAR REEDUCATION: CPT | Mod: CQ

## 2025-03-17 PROCEDURE — 97014 ELECTRIC STIMULATION THERAPY: CPT | Mod: CQ

## 2025-03-17 NOTE — PROGRESS NOTES
Outpatient Rehab    Physical Therapy Visit    Patient Name: Shiloh Thayer  MRN: [id number]  YOB: 1965  Encounter Date: 3/17/2025    Therapy Diagnosis:   Encounter Diagnosis   Name Primary?    Status post total knee replacement, left Yes     Physician: Dewayne Vargas III, *    Physician Orders: Eval and Treat  Medical Diagnosis: Primary osteoarthritis of left knee    Visit # / Visits Authorized:  2 / 20 + eval  Date of Evaluation: 3/12/2025  Insurance Authorization Period: 1/27/2025 to 1/27/2026  Plan of Care Certification:  3/12/2025 to 4/30/2025     PT/PTA: PTA   Number of PTA visits since last PT visit: 1  Time In: 0758   Time Out: 0920  Total Time: 82 minutes  Total Billable Time: 82 minutes    FOTO:  Intake Score: 29%  Survey Score 1:  %  Survey Score 2:  %    Subjective   Patient reports she feels like she is still dealing with swelling in her knee and her ankle. She states that she is not putting full weight into her leg because she is scared.  Pain reported as 3/10. Left knee    Objective   Knee Range of Motion   Left Knee   Active (deg) Passive (deg) Pain   Flexion 65 72 (AAROM with heel slides) Yes   Extension 0 NT       Treatment:  Balance/Neuromuscular Re-Education  NMR 1: Patient education: HEP compliance, swelling management, ROM expectations post TKA, importance of extension ROM for proper gait cycle and weight bearing  NMR 2: Afghan NMES 10 sec on/30 sec off including: quad sets with towel roll x 15 minutes, SAQs with medium bolster x 15 minutes (with strap), SAQs with 1/2 foam x 10 minutes  NMR 3: Heel prop to improve tissue extensibility and fascilitate quad activation x 3 minutes with HS stretch  NMR 4: Heel slides to improve tissue extensibility and ROM: 10 second hold, 5 minutes  NMR 5: Ambulation with RW in clinic with emphasis on proper gait cycle and weightbearing on Left LE. Encouraged weight shifting at home to improve tolerance.  NMR 6: Seated heel slides/knee flexion  "stretch in chair for 5 minutes; encouraged performing at home as part of HEP  Modalities  Moist Heat (min): cold pack for 00 minutes to Left knee with Bilateral LE's elevated on wedge for swelling reduction - NP, patient declined    Time Entry(in minutes):  E-Stim (Unattended) Time Entry: 40  Neuromuscular Re-Education Time Entry: 82    Assessment & Plan   Assessment: Shiloh is 1 week s/p Left TKA. She presents donning TERRIE Hose and ambulating with RW; visible decreased stance time observed on Left LE. Continued use of NMES for quadriceps strengthening and motor control. She requires use of strap for lifting assist with SAQs. Significant difficulty and discomfort noted during heel slides. She measured 65 degrees AROM, 72 degrees AAROM post session with mod assist from therapist and guarding observed. Discussed seated heel slides in chair and "dangling" her leg from the bed to improve tolerance; she verbalized understanding. Consider use of Nustep next session to help with ROM.  Evaluation/Treatment Tolerance: Patient tolerated treatment well    Patient will continue to benefit from skilled outpatient physical therapy to address the deficits listed in the problem list box on initial evaluation, provide pt/family education and to maximize pt's level of independence in the home and community environment.     Patient's spiritual, cultural, and educational needs considered and patient agreeable to plan of care and goals.           Plan: Will continue per POC towards treatment goals. PT/PTA met face to face to discuss patient's treatment plan and progress towards established goals. Patient will be seen by physical therapist every sixth visit and minimally once per month.    Goals:   Active       Functional outcome       Patient will show a significant change in FOTO patient-reported outcome tool to demonstrate subjective improvement (Progressing)       Start:  03/14/25    Expected End:  05/02/25            Patient stated " goal: to return to prior leve lof function with out pain  (Progressing)       Start:  03/14/25    Expected End:  05/02/25            Patient will demonstrate independence in home program for support of progression (Progressing)       Start:  03/14/25    Expected End:  04/18/25               Pain       Patient will report a 2 point reduction in pain while performing stair climbing to demonstrate improved activity tolerance (Progressing)       Start:  03/14/25    Expected End:  04/18/25               Range of Motion       Patient will achieve left knee ROM of  0-120 degrees  (Progressing)       Start:  03/14/25    Expected End:  04/18/25                Sissy Garcia, PTA

## 2025-03-19 ENCOUNTER — CLINICAL SUPPORT (OUTPATIENT)
Dept: REHABILITATION | Facility: HOSPITAL | Age: 60
End: 2025-03-19
Payer: OTHER GOVERNMENT

## 2025-03-19 DIAGNOSIS — Z96.652 STATUS POST TOTAL KNEE REPLACEMENT, LEFT: Primary | ICD-10-CM

## 2025-03-19 PROCEDURE — 97014 ELECTRIC STIMULATION THERAPY: CPT

## 2025-03-19 PROCEDURE — 97530 THERAPEUTIC ACTIVITIES: CPT

## 2025-03-19 PROCEDURE — 97112 NEUROMUSCULAR REEDUCATION: CPT

## 2025-03-19 NOTE — PROGRESS NOTES
Outpatient Rehab    Physical Therapy Visit    Patient Name: Shiloh Thayer  MRN: [id number]  YOB: 1965  Encounter Date: 3/19/2025    Therapy Diagnosis:   Encounter Diagnosis   Name Primary?    Status post total knee replacement, left Yes     Physician: Dewayne Vargas III, *    Physician Orders: Eval and Treat  Medical Diagnosis: Primary osteoarthritis of left knee    Visit # / Visits Authorized:  3 / 20  Date of Evaluation: 3/12/2025  Insurance Authorization Period: 1/27/2025 to 1/27/2026  Plan of Care Certification:  3/12/2025 to 5/2/2025      PT/PTA: PT   Number of PTA visits since last PT visit:0  Time In: 0804   Time Out: 0912  Total Time: 68 minutes  Total Billable Time: 61 minutes    FOTO: See media section.        Subjective   Patient reports no new pain at beginning of session. She has been performing heel slides at home..  Family / care giver present for this visit:     Left knee    Objective       Knee Range of Motion   Left Knee   Active (deg) Passive (deg) Pain   Flexion 75 80 Yes   Extension 1 (lacking)   Yes                   Treatment:  Balance/Neuromuscular Re-Education  NMR 1: Patient education: HEP compliance, swelling management, ROM expectations post TKA, importance of extension ROM for proper gait cycle and weight bearing  NMR 2: [place]ian NMES 10 sec on/30 sec off including: quad sets with towel roll x 10 minutes, SAQs with medium bolster x 10 minutes (with strap), SAQs with 1/2 foam x 10 minutes, LAQs seated EOM x 5 minutes  NMR 3: Heel prop to improve tissue extensibility and fascilitate quad activation x 3 minutes with HS stretch  Therapeutic Activity  TA 1: Sit to stand from high low mat: 1 x 10 reps, RW used for balance, BUE assistance    Time Entry(in minutes):  Neuromuscular Re-Education Time Entry: 53  Therapeutic Activity Time Entry: 8    Assessment & Plan   Assessment: Shiloh is 1 week, 2 days s/p Left TKA. She presents with TERRIE Hose donned and ambulating with RW;  visible decreased stance time observed on Left LE. Continued use of NMES for quadriceps strengthening and motor control. Introduced sit to stand transfer from elevated surface. She demonstrates increased UE use to stand. Will use NuStep for next session.  Evaluation/Treatment Tolerance: Patient tolerated treatment well    Patient will continue to benefit from skilled outpatient physical therapy to address the deficits listed in the problem list box on initial evaluation, provide pt/family education and to maximize pt's level of independence in the home and community environment.     Patient's spiritual, cultural, and educational needs considered and patient agreeable to plan of care and goals.           Plan: Will continue per POC towards treatment goals. PT/PTA met face to face to discuss patient's treatment plan and progress towards established goals. Patient will be seen by physical therapist every sixth visit and minimally once per month.    Goals:   Active       Functional outcome       Patient will show a significant change in FOTO patient-reported outcome tool to demonstrate subjective improvement (Progressing)       Start:  03/14/25    Expected End:  05/02/25            Patient stated goal: to return to prior leve lof function with out pain  (Progressing)       Start:  03/14/25    Expected End:  05/02/25            Patient will demonstrate independence in home program for support of progression (Progressing)       Start:  03/14/25    Expected End:  04/18/25               Pain       Patient will report a 2 point reduction in pain while performing stair climbing to demonstrate improved activity tolerance (Progressing)       Start:  03/14/25    Expected End:  04/18/25               Range of Motion       Patient will achieve left knee ROM of  0-120 degrees  (Progressing)       Start:  03/14/25    Expected End:  04/18/25                Rere Knight, PT, DPT

## 2025-03-21 ENCOUNTER — CLINICAL SUPPORT (OUTPATIENT)
Dept: REHABILITATION | Facility: HOSPITAL | Age: 60
End: 2025-03-21
Payer: OTHER GOVERNMENT

## 2025-03-21 DIAGNOSIS — Z96.652 STATUS POST TOTAL KNEE REPLACEMENT, LEFT: Primary | ICD-10-CM

## 2025-03-21 PROCEDURE — 97112 NEUROMUSCULAR REEDUCATION: CPT | Mod: CQ

## 2025-03-21 PROCEDURE — 97014 ELECTRIC STIMULATION THERAPY: CPT | Mod: CQ

## 2025-03-21 NOTE — PROGRESS NOTES
Outpatient Rehab    Physical Therapy Visit    Patient Name: Shiloh Thayer  MRN: [id number]  YOB: 1965  Encounter Date: 3/21/2025    Therapy Diagnosis:   Encounter Diagnosis   Name Primary?    Status post total knee replacement, left Yes     Physician: Dewayne Vargas III, *    Physician Orders: Eval and Treat  Medical Diagnosis: Primary osteoarthritis of left knee    Visit # / Visits Authorized:  4 / 20  Date of Evaluation: 3/12/2025  Insurance Authorization Period: 1/27/2025 to 1/27/2026  Plan of Care Certification:  3/12/2025 to 5/2/2025      PT/PTA: PTA   Number of PTA visits since last PT visit: 1  Time In: 0758   Time Out: 0915  Total Time: 77 minutes  Total Billable Time: 67 minutes    FOTO:  Intake Score: 29%  Survey Score 1:  %  Survey Score 2:  %    Subjective   Patient reports that she feels stiff, but her pain is getting better. States that she still has discomfort with hanging her leg off the side of the bed, but it is improving. She was able to get her own shoes on this morning.  Family / care giver present for this visit:   Pain reported as 2/10. Left knee    Objective   Knee Range of Motion   Left Knee   Active (deg) Passive (deg) Pain   Flexion   76 Yes   Extension           Treatment:  Balance/Neuromuscular Re-Education  NMR 1: Patient education: HEP compliance, swelling management, ROM expectations post TKA, importance of extension ROM for proper gait cycle and weight bearing  NMR 2: [place] NMES 10 sec on/30 sec off including: quad sets with towel roll x 10 minutes, SAQs with medium bolster x 10 minutes (with strap), SAQs with 1/2 foam (with strap) x 10 minutes  NMR 3: Heel prop to improve tissue extensibility and fascilitate quad activation x 3 minutes with HS stretch  NMR 4: Heel slides to improve tissue extensibility and ROM: 10 second hold, 5 minutes  NMR 5: Ambulation with SPC in clinic with emphasis on proper gait cycle  NMR 7: Nustep for gait mechanics; 5 minutes, Level  1  Modalities  Moist Heat (min): cold pack for 10 minutes to Left knee with Bilateral LE's elevated on wedge for swelling reduction    Time Entry(in minutes):  E-Stim (Unattended) Time Entry: 30  Hot/Cold Pack Time Entry: 10  Neuromuscular Re-Education Time Entry: 67    Assessment & Plan   Assessment: Shiloh is 1 week, 4 days s/p Left TKA. She presents ambulating with RW and TERRIE Hose donned. Continues to demonstrate increased pain and discomfort with dependent positioning at edge of the mat. Continued use of NMES for quadriceps strengthening and motor control with good tolerance. Requires use of strap for lifting assist during SAQs. Measured 76 degrees PROM Flexion. She notes medial knee pain that presents as nerve tension with end range Flexion.  Evaluation/Treatment Tolerance: Patient tolerated treatment well    Patient will continue to benefit from skilled outpatient physical therapy to address the deficits listed in the problem list box on initial evaluation, provide pt/family education and to maximize pt's level of independence in the home and community environment.     Patient's spiritual, cultural, and educational needs considered and patient agreeable to plan of care and goals.           Plan: Will continue per POC towards treatment goals. PT/PTA met face to face to discuss patient's treatment plan and progress towards established goals. Patient will be seen by physical therapist every sixth visit and minimally once per month.    Goals:   L TKA prehab Problems       L TKA prehab Problems (Resolved)       Functional outcome       Patient will demonstrate independence in home program for support of progression (Met)       Start:  02/03/25    Expected End:  03/31/25    Resolved:  02/03/25              L s/p TKA Problems       L s/p TKA Problems (Active)       Functional outcome       Patient will show a significant change in FOTO patient-reported outcome tool to demonstrate subjective improvement  (Progressing)       Start:  03/14/25    Expected End:  05/02/25            Patient stated goal: to return to prior leve lof function with out pain  (Progressing)       Start:  03/14/25    Expected End:  05/02/25            Patient will demonstrate independence in home program for support of progression (Progressing)       Start:  03/14/25    Expected End:  04/18/25               Pain       Patient will report a 2 point reduction in pain while performing stair climbing to demonstrate improved activity tolerance (Progressing)       Start:  03/14/25    Expected End:  04/18/25               Range of Motion       Patient will achieve left knee ROM of  0-120 degrees  (Progressing)       Start:  03/14/25    Expected End:  04/18/25                   Sissy Garcia, PTA

## 2025-03-24 ENCOUNTER — CLINICAL SUPPORT (OUTPATIENT)
Dept: REHABILITATION | Facility: HOSPITAL | Age: 60
End: 2025-03-24
Payer: OTHER GOVERNMENT

## 2025-03-24 DIAGNOSIS — Z96.652 STATUS POST TOTAL KNEE REPLACEMENT, LEFT: Primary | ICD-10-CM

## 2025-03-24 PROCEDURE — 97530 THERAPEUTIC ACTIVITIES: CPT

## 2025-03-24 PROCEDURE — 97112 NEUROMUSCULAR REEDUCATION: CPT

## 2025-03-24 NOTE — PROGRESS NOTES
Outpatient Rehab    Physical Therapy Visit    Patient Name: Shiloh Thayer  MRN: [id number]  YOB: 1965  Encounter Date: 3/24/2025    Therapy Diagnosis:   Encounter Diagnosis   Name Primary?    Status post total knee replacement, left Yes     Physician: Dewayne Vargas III, *    Physician Orders: Eval and Treat  Medical Diagnosis: Primary osteoarthritis of left knee    Visit # / Visits Authorized:  5 / 20  Insurance Authorization Period: 1/27/2025 to 1/27/2026  Date of Evaluation: 3/10/2025  Plan of Care Certification: 3/10/2025 to 5/2/2025     PT/PTA: PT   Number of PTA visits since last PT visit:0  Time In: 0805   Time Out: 0910  Total Time: 65   Total Billable Time:  62 minutes    FOTO: See media section.        Subjective   Patient reports that she has been working on sitting at the edge of a chair to improve knee flexion..  Family / care giver present for this visit:   Pain reported as 2/10. Left knee    Objective       Knee Range of Motion   Left Knee   Active (deg) Passive (deg) Pain   Flexion 80 86     Extension 0                       Treatment:  Balance/Neuromuscular Re-Education  NMR 1: Patient education: HEP compliance, swelling management, ROM expectations post TKA, importance of extension ROM for proper gait cycle and weight bearing  NMR 2: [place] NMES 10 sec on/30 sec off including: quad sets x 10 minutes, SAQs with medium bolster x 8 minutes (with strap), SAQs with 1/2 foam (with strap) x 14 minutes, LAQs (with strap) x 5 minutes  NMR 3: Heel prop to improve tissue extensibility and fascilitate quad activation x 2 minutes  NMR 4: Heel slides to improve tissue extensibility and ROM: 10 second hold, 5 minutes  Therapeutic Activity  TA 2: NuStep for endgoenous release of opioids: 8'/800 steps    Time Entry(in minutes):  Neuromuscular Re-Education Time Entry: 54  Therapeutic Activity Time Entry: 8    Assessment & Plan   Assessment: Shiloh is 2 weeks, 0 days s/p Left TKA. She presents  ambulating with RW. Continues to demonstrate increased pain and discomfort with dependent positioning at edge of the mat. Continued use of NMES for quadriceps strengthening and motor control with good tolerance. Requires use of strap for lifting assist during SAQs. Measured 76 degrees PROM Flexion. She notes medial knee pain that presents as nerve tension with end range Flexion.       Patient will continue to benefit from skilled outpatient physical therapy to address the deficits listed in the problem list box on initial evaluation, provide pt/family education and to maximize pt's level of independence in the home and community environment.     Patient's spiritual, cultural, and educational needs considered and patient agreeable to plan of care and goals.           Plan: Will continue per POC towards treatment goals. PT/PTA met face to face to discuss patient's treatment plan and progress towards established goals. Patient will be seen by physical therapist every sixth visit and minimally once per month.    Goals:   L TKA prehab Problems       L TKA prehab Problems (Resolved)       Functional outcome       Patient will demonstrate independence in home program for support of progression (Met)       Start:  02/03/25    Expected End:  03/31/25    Resolved:  02/03/25              L s/p TKA Problems       L s/p TKA Problems (Active)       Functional outcome       Patient will show a significant change in FOTO patient-reported outcome tool to demonstrate subjective improvement (Progressing)       Start:  03/14/25    Expected End:  05/02/25            Patient stated goal: to return to prior leve lof function with out pain  (Progressing)       Start:  03/14/25    Expected End:  05/02/25            Patient will demonstrate independence in home program for support of progression (Progressing)       Start:  03/14/25    Expected End:  04/18/25               Pain       Patient will report a 2 point reduction in pain while  performing stair climbing to demonstrate improved activity tolerance (Progressing)       Start:  03/14/25    Expected End:  04/18/25               Range of Motion       Patient will achieve left knee ROM of  0-120 degrees  (Progressing)       Start:  03/14/25    Expected End:  04/18/25                   Rere Knight, PT, DPT

## 2025-03-25 ENCOUNTER — OFFICE VISIT (OUTPATIENT)
Dept: ORTHOPEDICS | Facility: CLINIC | Age: 60
End: 2025-03-25
Payer: OTHER GOVERNMENT

## 2025-03-25 DIAGNOSIS — Z96.652 S/P TOTAL KNEE REPLACEMENT, LEFT: ICD-10-CM

## 2025-03-25 PROCEDURE — 99999 PR PBB SHADOW E&M-EST. PATIENT-LVL III: CPT | Mod: PBBFAC,,, | Performed by: NURSE PRACTITIONER

## 2025-03-25 PROCEDURE — 99024 POSTOP FOLLOW-UP VISIT: CPT | Mod: S$GLB,,, | Performed by: NURSE PRACTITIONER

## 2025-03-25 RX ORDER — METHYLPREDNISOLONE 4 MG/1
TABLET ORAL
Qty: 1 EACH | Refills: 0 | Status: SHIPPED | OUTPATIENT
Start: 2025-03-25 | End: 2025-04-15

## 2025-03-25 RX ORDER — OXYCODONE HYDROCHLORIDE 5 MG/1
TABLET ORAL
Qty: 40 TABLET | Refills: 0 | Status: SHIPPED | OUTPATIENT
Start: 2025-03-25

## 2025-03-25 NOTE — PROGRESS NOTES
Shiloh Thayer presents for initial post-operative visit following a left total knee arthroplasty performed by Dr. Vargas on 3/10/2025.      Exam:     Ambulating well with assistive device.  Incision is clean and dry without drainage or erythema.   ROM:10-80. Advised patient to do more quad sets and showed her how to work on flexion    Initial post-operative radiographs reviewed today revealing a well fixed and aligned prosthesis.    A/P:  2 weeks s/p left total knee arthroplasty    - The patient was advised to keep the incision clean and dry for the next 24 hours after which she may wash the area with antibacterial soap in the shower. Will not submerge until the incision is completely healed.   - Outpatient PT ongoing: at the [place] location  - medrol dose pack to help with ROM  - Continue aspirin for 1 month post op  - Pain medication: refilled  - Reviewed antibiotic prophylaxis   - Follow up in 4 weeks with xray. Pt will call clinic with problems/concerns.

## 2025-03-26 ENCOUNTER — CLINICAL SUPPORT (OUTPATIENT)
Dept: REHABILITATION | Facility: HOSPITAL | Age: 60
End: 2025-03-26
Payer: OTHER GOVERNMENT

## 2025-03-26 DIAGNOSIS — Z96.652 STATUS POST TOTAL KNEE REPLACEMENT, LEFT: Primary | ICD-10-CM

## 2025-03-26 PROCEDURE — 97112 NEUROMUSCULAR REEDUCATION: CPT | Mod: CQ

## 2025-03-26 PROCEDURE — 97014 ELECTRIC STIMULATION THERAPY: CPT | Mod: CQ

## 2025-03-26 NOTE — PROGRESS NOTES
Outpatient Rehab    Physical Therapy Visit    Patient Name: Shiloh Thayer  MRN: [id number]  YOB: 1965  Encounter Date: 3/26/2025    Therapy Diagnosis:   Encounter Diagnosis   Name Primary?    Status post total knee replacement, left Yes     Physician: Dewayne Vargas III, *    Physician Orders: Eval and Treat  Medical Diagnosis: Primary osteoarthritis of left knee    Visit # / Visits Authorized:  6 / 20  Insurance Authorization Period: 1/27/2025 to 1/27/2026  Date of Evaluation: 3/10/2025  Plan of Care Certification: 3/10/2025 to 5/2/2025     PT/PTA: PTA   Number of PTA visits since last PT visit: 1  Time In: 0800   Time Out: 0907  Total Time: 67 minutes  Total Billable Time: 67 minutes    FOTO:  Intake Score: 29%  Survey Score 1: 61%  Survey Score 2:  %    Subjective   Patient reports seeing the MD yesterday and being shown a new bending exercise and being given a steroid pack to take. She has been working on that and now feels a bit more sore. She has not been practicing with the cane due to hesitation and fear.  Family / care giver present for this visit:   Pain reported as 5/10. Left knee    Objective   Knee Range of Motion   Left Knee   Active (deg) Passive (deg) Pain   Flexion 78 91 (AAROM with heel slides)     Extension NT NT       Treatment:  Balance/Neuromuscular Re-Education  NMR 1: Patient education: HEP compliance, swelling management, ROM expectations post TKA, importance of extension ROM for proper gait cycle and weight bearing  NMR 2: NMES (portable unit) 10 sec on/30 sec off including: quad sets x 8 minutes - SAQs with medium bolster x 8 minutes (with strap fo tke) - SAQs with 1/2 foam (with strap for lift) x 8 minutes - LAQs (with strap) x 00 minutes (NP)  NMR 4: Heel slides to improve tissue extensibility and ROM: 10 second hold, 5 minutes x 2 trials with TENS unit on  NMR 7: Nustep for gait mechanics; 5 minutes, Level 5 - Recumbent Bike at half revolutions, seat 12 for 5 minutes  to improve tissue mobility (able to complete 2 full revolutions after 5 minutes)    Time Entry(in minutes):  E-Stim (Unattended) Time Entry: 25  Neuromuscular Re-Education Time Entry: 67    Assessment & Plan   Assessment: Shiloh is 2 weeks, 2 days s/p Left TKA. She presents ambulating with RW. Continued use of NMES with visible improvement in muscle activation and isolation demonstrated by ability to actively lift during SAQ's. Use of TENS unit during heel slides to assist with pain reduction. Measured 91 degrees AAROM Flexion post session.  Evaluation/Treatment Tolerance: Patient tolerated treatment well    Patient will continue to benefit from skilled outpatient physical therapy to address the deficits listed in the problem list box on initial evaluation, provide pt/family education and to maximize pt's level of independence in the home and community environment.     Patient's spiritual, cultural, and educational needs considered and patient agreeable to plan of care and goals.           Plan: Will continue per POC towards treatment goals. PT/PTA met face to face to discuss patient's treatment plan and progress towards established goals. Patient will be seen by physical therapist every sixth visit and minimally once per month.    Goals:   L TKA prehab Problems       L TKA prehab Problems (Resolved)       Functional outcome       Patient will demonstrate independence in home program for support of progression (Met)       Start:  02/03/25    Expected End:  03/31/25    Resolved:  02/03/25              L s/p TKA Problems       L s/p TKA Problems (Active)       Functional outcome       Patient will show a significant change in FOTO patient-reported outcome tool to demonstrate subjective improvement (Progressing)       Start:  03/14/25    Expected End:  05/02/25            Patient stated goal: to return to prior leve lof function with out pain  (Progressing)       Start:  03/14/25    Expected End:  05/02/25             Patient will demonstrate independence in home program for support of progression (Progressing)       Start:  03/14/25    Expected End:  04/18/25               Pain       Patient will report a 2 point reduction in pain while performing stair climbing to demonstrate improved activity tolerance (Progressing)       Start:  03/14/25    Expected End:  04/18/25               Range of Motion       Patient will achieve left knee ROM of  0-120 degrees  (Progressing)       Start:  03/14/25    Expected End:  04/18/25                   Sissy Garcia, PTA

## 2025-03-28 ENCOUNTER — CLINICAL SUPPORT (OUTPATIENT)
Dept: REHABILITATION | Facility: HOSPITAL | Age: 60
End: 2025-03-28
Payer: OTHER GOVERNMENT

## 2025-03-28 DIAGNOSIS — Z96.652 STATUS POST TOTAL KNEE REPLACEMENT, LEFT: Primary | ICD-10-CM

## 2025-03-28 PROCEDURE — 97112 NEUROMUSCULAR REEDUCATION: CPT

## 2025-03-28 PROCEDURE — 97530 THERAPEUTIC ACTIVITIES: CPT

## 2025-03-28 NOTE — PROGRESS NOTES
"  Outpatient Rehab    Physical Therapy Visit    Patient Name: Shiloh Thayer  MRN: [id number]  YOB: 1965  Encounter Date: 3/28/2025    Therapy Diagnosis:   Encounter Diagnosis   Name Primary?    Status post total knee replacement, left Yes     Physician: Dewayne Vargas III, *    Physician Orders: Eval and Treat  Medical Diagnosis: Primary osteoarthritis of left knee    Visit # / Visits Authorized:  7 / 20  Insurance Authorization Period: 1/27/2025 to 1/27/2026  Date of Evaluation: 3/12/2025  Plan of Care Certification: 3/12/2025 to 5/2/2025     PT/PTA: PT   Number of PTA visits since last PT visit:0  Time In: 0702   Time Out: 0803  Total Time: 61 minutes  Total Billable Time:  61 minutes    FOTO: See media section.     Subjective   Patient reports that she has been ambulating with single point cane around her home but is not fully confident with it yet.  Family / care giver present for this visit:   Pain reported as 5/10. Left knee    Objective            Treatment:  Balance/Neuromuscular Re-Education  NMR 4: Heel slides to improve tissue extensibility and ROM: 10 second hold, 5 minutes; prone knee flexion stretch with green strap: 10 second hold, 5 minutes  NMR 5: Ambulation with SPC in clinic with emphasis on proper gait cycle: ~250 ft in clinic  NMR 7: Nustep for gait mechanics; 10 minutes, Level 5 - Recumbent Bike at half to full revolutions, seat 12 for 5 minutes to improve tissue mobility  Therapeutic Activity  TA 2: Step ups from 4" step: 3 x 10 reps with BUE assist  TA 3: Patient education: HEP compliance, swelling management, ROM expectations post TKA, importance of extension ROM for proper gait cycle and weight bearing    Time Entry(in minutes):  Neuromuscular Re-Education Time Entry: 38  Therapeutic Activity Time Entry: 23    Assessment & Plan   Assessment: Shiloh is 2 weeks, 4 days s/p Left TKA. She presents ambulating with RW and ambulating in clinic with single point cane. She " demonstrates improved gait mechanics with quad activation throughout gait cycle. She demonstrates slight weight shift to right knee in stance on L knee. She was able to achieve 90 degrees AROM and 102 AAROM. Patient was educated on importance of ambulation with single point cane for household ambulation and knee flexion ROM exercises. Will continue to progress as tolerated.  Evaluation/Treatment Tolerance: Patient tolerated treatment well    Patient will continue to benefit from skilled outpatient physical therapy to address the deficits listed in the problem list box on initial evaluation, provide pt/family education and to maximize pt's level of independence in the home and community environment.     Patient's spiritual, cultural, and educational needs considered and patient agreeable to plan of care and goals.           Plan: Consider shuttle for next visit.    Goals:   L TKA prehab Problems       L TKA prehab Problems (Resolved)       Functional outcome       Patient will demonstrate independence in home program for support of progression (Met)       Start:  02/03/25    Expected End:  03/31/25    Resolved:  02/03/25              L s/p TKA Problems       L s/p TKA Problems (Active)       Functional outcome       Patient will show a significant change in FOTO patient-reported outcome tool to demonstrate subjective improvement (Progressing)       Start:  03/14/25    Expected End:  05/02/25            Patient stated goal: to return to prior leve lof function with out pain  (Progressing)       Start:  03/14/25    Expected End:  05/02/25            Patient will demonstrate independence in home program for support of progression (Progressing)       Start:  03/14/25    Expected End:  04/18/25               Pain       Patient will report a 2 point reduction in pain while performing stair climbing to demonstrate improved activity tolerance (Progressing)       Start:  03/14/25    Expected End:  04/18/25               Range  of Motion       Patient will achieve left knee ROM of  0-120 degrees  (Progressing)       Start:  03/14/25    Expected End:  04/18/25                   Rere Knight PT, DPT

## 2025-03-31 ENCOUNTER — CLINICAL SUPPORT (OUTPATIENT)
Dept: REHABILITATION | Facility: HOSPITAL | Age: 60
End: 2025-03-31
Payer: OTHER GOVERNMENT

## 2025-03-31 DIAGNOSIS — Z96.652 STATUS POST TOTAL KNEE REPLACEMENT, LEFT: Primary | ICD-10-CM

## 2025-03-31 PROCEDURE — 97530 THERAPEUTIC ACTIVITIES: CPT | Mod: CQ

## 2025-03-31 PROCEDURE — 97112 NEUROMUSCULAR REEDUCATION: CPT | Mod: CQ

## 2025-03-31 PROCEDURE — 97014 ELECTRIC STIMULATION THERAPY: CPT | Mod: CQ

## 2025-03-31 NOTE — PROGRESS NOTES
Outpatient Rehab    Physical Therapy Visit    Patient Name: Shiloh Thayer  MRN: [id number]  YOB: 1965  Encounter Date: 3/31/2025    Therapy Diagnosis:   Encounter Diagnosis   Name Primary?    Status post total knee replacement, left Yes     Physician: Dewayne Vargas III, *    Physician Orders: Eval and Treat  Medical Diagnosis: Primary osteoarthritis of left knee    Visit # / Visits Authorized:  8 / 20  Insurance Authorization Period: 1/27/2025 to 1/27/2026  Date of Evaluation: 3/12/2025  Plan of Care Certification: 3/12/2025 to 5/2/2025     PT/PTA: PTA   Number of PTA visits since last PT visit: 1  Time In: 0755   Time Out: 0910  Total Time: 75 minutes  Total Billable Time: 75 minutes    FOTO:  Intake Score: 29%  Survey Score 1: 61%  Survey Score 2:  %    Subjective   Patient reports that she feels stiff this morning, pain is doing ok.  Family / care giver present for this visit:   Pain reported as 0/10. Left knee    Objective  Objective Measures updated at progress report unless specified.     Treatment:  Balance/Neuromuscular Re-Education  NMR 1: Patient education: HEP compliance, swelling management, ROM expectations post TKA, importance of extension ROM for proper gait cycle and weight bearing  NMR 2: NMES (portable unit) 10 sec on/30 sec off including: LAQs at EOM x 10 minutes + 3 lb AW  NMR 4: Heel slides to improve tissue extensibility and ROM: 10 second hold, 5 minutes  NMR 5: Ambulation with SPC in clinic with emphasis on proper gait cycle: ~250 ft in clinic  NMR 7: Nustep for gait mechanics; 5 minutes, Level 5 - Recumbent Bike at half revolutions, seat 14 for 5 minutes to improve tissue mobility  Therapeutic Activity  TA 1: Sit to  std chair + airex: 3 x 10 reps  TA 2: Step ups onto 4-inch step: 3 x 10 reps with BUE assist  TA 3: Patient education: HEP compliance, swelling management, ROM expectations post TKA, importance of extension ROM for proper gait cycle and weight  bearing  TA 4: Shuttle DL Press; 50 lbs, 3 x 10 reps with 3 sec tke    Time Entry(in minutes):  E-Stim (Unattended) Time Entry: 10  Neuromuscular Re-Education Time Entry: 45  Therapeutic Activity Time Entry: 30    Assessment & Plan   Assessment: Shiloh is 3 weeks s/p Left TKA. She demonstrates increased difficulty with knee flexion today likely due to subjective complaints of increased stiffness, feeling swelling during PT interventions, and not taking her pain medication prior to PT. Addition of Shuttle today to improve tolerance to ADL's. Mod cueing with step ups for forward weight distrubution to improve weight bearing tolerance and fascilitate quadriceps musculature. Measured 100-104 degrees knee Flexion post session.  Evaluation/Treatment Tolerance: Patient tolerated treatment well    Patient will continue to benefit from skilled outpatient physical therapy to address the deficits listed in the problem list box on initial evaluation, provide pt/family education and to maximize pt's level of independence in the home and community environment.     Patient's spiritual, cultural, and educational needs considered and patient agreeable to plan of care and goals.           Plan: Will continue per POC towards treatment goals. PT/PTA met face to face to discuss patient's treatment plan and progress towards established goals. Patient will be seen by physical therapist every sixth visit and minimally once per month.    Goals:   L TKA prehab Problems       L TKA prehab Problems (Resolved)       Functional outcome       Patient will demonstrate independence in home program for support of progression (Met)       Start:  02/03/25    Expected End:  03/31/25    Resolved:  02/03/25              L s/p TKA Problems       L s/p TKA Problems (Active)       Functional outcome       Patient will show a significant change in FOTO patient-reported outcome tool to demonstrate subjective improvement (Progressing)       Start:  03/14/25     Expected End:  05/02/25            Patient stated goal: to return to prior leve lof function with out pain  (Progressing)       Start:  03/14/25    Expected End:  05/02/25            Patient will demonstrate independence in home program for support of progression (Progressing)       Start:  03/14/25    Expected End:  04/18/25               Pain       Patient will report a 2 point reduction in pain while performing stair climbing to demonstrate improved activity tolerance (Progressing)       Start:  03/14/25    Expected End:  04/18/25               Range of Motion       Patient will achieve left knee ROM of  0-120 degrees  (Progressing)       Start:  03/14/25    Expected End:  04/18/25                   Sissy Garcia, PTA

## 2025-04-02 ENCOUNTER — CLINICAL SUPPORT (OUTPATIENT)
Dept: REHABILITATION | Facility: HOSPITAL | Age: 60
End: 2025-04-02
Payer: OTHER GOVERNMENT

## 2025-04-02 DIAGNOSIS — Z96.652 STATUS POST TOTAL KNEE REPLACEMENT, LEFT: Primary | ICD-10-CM

## 2025-04-02 PROCEDURE — 97530 THERAPEUTIC ACTIVITIES: CPT

## 2025-04-02 PROCEDURE — 97112 NEUROMUSCULAR REEDUCATION: CPT

## 2025-04-02 NOTE — PROGRESS NOTES
Outpatient Rehab    Physical Therapy Progress Note    Patient Name: Shiloh Thayer  MRN: [id number]  YOB: 1965  Encounter Date: 4/2/2025    Therapy Diagnosis:   Encounter Diagnosis   Name Primary?    Status post total knee replacement, left Yes     Physician: Dewayne Vargas III, *    Physician Orders: Eval and Treat  Medical Diagnosis: Primary osteoarthritis of left knee    Visit # / Visits Authorized:  9 / 20  Insurance Authorization Period: 1/27/2025 to 1/27/2026  Date of Evaluation: 3/12/2025  Plan of Care Certification: 3/12/2025 to 5/2/2025     PT/PTA: PT   Number of PTA visits since last PT visit:0  Time In: 0805   Time Out: 0905  Total Time: 60   Total Billable Time:  60 minutes    FOTO: See media section.        Subjective   Patient reports increased knee stiffness today.  Family / care giver present for this visit:          Objective       Knee Range of Motion   Left Knee   Active (deg) Passive (deg) Pain   Flexion 99 105 Yes   Extension                         Ambulation Assistance Required  Surface With  Assistive Device Without Assistive Device Details   Level Independent        Uneven         Curb           Stairs Assistance Required   Assistance Level Upper Extremity Support Pattern   Ascending Supervision Two rails Non-reciprocal   Descending Supervision Two rails Non-reciprocal            Treatment:  Balance/Neuromuscular Re-Education  NMR 1: Patient education: HEP compliance, swelling management, ROM expectations post TKA, importance of extension ROM for proper gait cycle and weight bearing  NMR 4: Heel slides to improve tissue extensibility and ROM: 10 second hold, 5 minutes  NMR 6: seated LAQ (green strap), 5 second hold + knee flexion, 10 second hold: 30 reps,  NMR 7: Recumbent Bike at half to full revolutions, seat 14 for 10 minutes to improve tissue mobility  Therapeutic Activity  TA 4: Shuttle DL Press 50 lbs, SL Press 25 lbs, 3 x 10 reps with 3 sec tke    Time Entry(in  minutes):  Neuromuscular Re-Education Time Entry: 41  Therapeutic Activity Time Entry: 19    Assessment & Plan   Assessment: Shiloh is 3 weeks, 2 days s/p Left TKA. She continues to report increased stiffness in her left knee, especially with increased knee flexion. She was able to perform single and double leg press on shuttle today. Measured 100-104 degrees knee Flexion post session. Will continue to progress as tolerated.  Evaluation/Treatment Tolerance: Patient tolerated treatment well    Patient will continue to benefit from skilled outpatient physical therapy to address the deficits listed in the problem list box on initial evaluation, provide pt/family education and to maximize pt's level of independence in the home and community environment.     Patient's spiritual, cultural, and educational needs considered and patient agreeable to plan of care and goals.           Plan: Consider hurdles next session    Goals:   L TKA prehab Problems       L TKA prehab Problems (Resolved)       Functional outcome       Patient will demonstrate independence in home program for support of progression (Met)       Start:  02/03/25    Expected End:  03/31/25    Resolved:  02/03/25              L s/p TKA Problems       L s/p TKA Problems (Active)       Functional outcome       Patient will show a significant change in FOTO patient-reported outcome tool to demonstrate subjective improvement (Progressing)       Start:  03/14/25    Expected End:  05/02/25            Patient stated goal: to return to prior leve lof function with out pain  (Progressing)       Start:  03/14/25    Expected End:  05/02/25            Patient will demonstrate independence in home program for support of progression (Progressing)       Start:  03/14/25    Expected End:  04/18/25               Pain       Patient will report a 2 point reduction in pain while performing stair climbing to demonstrate improved activity tolerance (Progressing)       Start:   03/14/25    Expected End:  04/18/25               Range of Motion       Patient will achieve left knee ROM of  0-120 degrees  (Progressing)       Start:  03/14/25    Expected End:  04/18/25                   Rere Knight PT, DPT

## 2025-04-04 ENCOUNTER — CLINICAL SUPPORT (OUTPATIENT)
Dept: REHABILITATION | Facility: HOSPITAL | Age: 60
End: 2025-04-04
Payer: OTHER GOVERNMENT

## 2025-04-04 DIAGNOSIS — Z96.652 STATUS POST TOTAL KNEE REPLACEMENT, LEFT: Primary | ICD-10-CM

## 2025-04-04 PROCEDURE — 97530 THERAPEUTIC ACTIVITIES: CPT | Mod: CQ

## 2025-04-04 PROCEDURE — 97112 NEUROMUSCULAR REEDUCATION: CPT | Mod: CQ

## 2025-04-04 NOTE — PROGRESS NOTES
"  Outpatient Rehab    Physical Therapy Visit    Patient Name: Shiloh Thayer  MRN: [id number]  YOB: 1965  Encounter Date: 4/4/2025    Therapy Diagnosis:   Encounter Diagnosis   Name Primary?    Status post total knee replacement, left Yes     Physician: Dewayne Vargas III, *    Physician Orders: Eval and Treat  Medical Diagnosis: Primary osteoarthritis of left knee    Visit # / Visits Authorized:  10 / 20  Insurance Authorization Period: 1/27/2025 to 1/27/2026  Date of Evaluation: 3/12/2025  Plan of Care Certification: 3/12/2025 to 5/2/2025     PT/PTA: PTA   Number of PTA visits since last PT visit: 1  Time In: 0755   Time Out: 0910  Total Time: 75 minutes  Total Billable Time: 75 minutes    FOTO:  Intake Score: 29%  Survey Score 1: 61%  Survey Score 2:  %    Subjective   Patient reports she has no pain, but feels like her knee is "tight". She has been working on massaging it.  Family / care giver present for this visit:   Pain reported as 0/10. Left knee    Objective   Knee Range of Motion   Left Knee   Active (deg) Passive (deg) Pain   Flexion 107 110     Extension           Treatment:  Balance/Neuromuscular Re-Education  NMR 1: Patient education: HEP compliance, swelling management, ROM expectations post TKA, importance of extension ROM for proper gait cycle and weight bearing, self massage to help with swelling, purchasing ankle weights for home use  NMR 2: Standing TKE with ball; 10 second hold, 20 reps  NMR 4: Heel slides to improve tissue extensibility and ROM: 10 second hold, 5 minutes  NMR 5: Ambulation with SPC in clinic with emphasis on proper gait cycle: ~250 ft in clinic  NMR 6: LAQs at EOM + 4 lb; 10 second hold, 3 x 10 reps  NMR 7: Recumbent Bike at half to full revolutions, seat 15 for 5 minutes to improve tissue mobility  Therapeutic Activity  TA 1: Sit to  std chair + airex: 3 x 10 reps  TA 2: Step ups onto 4-inch step: 3 x 10 reps with BUE assist with cueing for forward " weight distribution for quad fascilitation  TA 3: Patient education: HEP compliance, swelling management, ROM expectations post TKA, importance of extension ROM for proper gait cycle and weight bearing  TA 4: Shuttle DL Press: 62.5 lbs, 3 x 10 reps -- SL Press: 25 lbs, 3 x 10 reps with 3 sec tke  TA 5: [employer] navigation (6-inch) to improve community ambulation: 4 laps, forward  TA 6: Nustep for gait mechanics and CV endurance; 5 minutes, Level 5    Time Entry(in minutes):  Neuromuscular Re-Education Time Entry: 23  Therapeutic Activity Time Entry: 52    Assessment & Plan   Assessment: Shiloh is 3 weeks, 4 days s/p Left TKA. She presents ambulating with single point cane. Addition of standing TKE to improve quad activation in weight bearing, encouraged performing at home. [employer] navigation completed with emphasis on hip and knee flexion. Able to complete full revolutions on bike after use of Nustep. Measured 107 degrees AROM, 110 degrees AAROM Flexion post session.  Evaluation/Treatment Tolerance: Patient tolerated treatment well    Patient will continue to benefit from skilled outpatient physical therapy to address the deficits listed in the problem list box on initial evaluation, provide pt/family education and to maximize pt's level of independence in the home and community environment.     Patient's spiritual, cultural, and educational needs considered and patient agreeable to plan of care and goals.           Plan: Will continue per POC towards treatment goals. PT/PTA met face to face to discuss patient's treatment plan and progress towards established goals. Patient will be seen by physical therapist every sixth visit and minimally once per month.    Goals:   L TKA prehab Problems       L TKA prehab Problems (Resolved)       Functional outcome       Patient will demonstrate independence in home program for support of progression (Met)       Start:  02/03/25    Expected End:  03/31/25    Resolved:  02/03/25               L s/p TKA Problems       L s/p TKA Problems (Active)       Functional outcome       Patient will show a significant change in FOTO patient-reported outcome tool to demonstrate subjective improvement (Progressing)       Start:  03/14/25    Expected End:  05/02/25            Patient stated goal: to return to prior leve lof function with out pain  (Progressing)       Start:  03/14/25    Expected End:  05/02/25            Patient will demonstrate independence in home program for support of progression (Progressing)       Start:  03/14/25    Expected End:  04/18/25               Pain       Patient will report a 2 point reduction in pain while performing stair climbing to demonstrate improved activity tolerance (Progressing)       Start:  03/14/25    Expected End:  04/18/25               Range of Motion       Patient will achieve left knee ROM of  0-120 degrees  (Progressing)       Start:  03/14/25    Expected End:  04/18/25                   Sissy Garcia, PTA

## 2025-04-07 ENCOUNTER — CLINICAL SUPPORT (OUTPATIENT)
Dept: REHABILITATION | Facility: HOSPITAL | Age: 60
End: 2025-04-07
Payer: OTHER GOVERNMENT

## 2025-04-07 DIAGNOSIS — Z96.652 STATUS POST TOTAL KNEE REPLACEMENT, LEFT: Primary | ICD-10-CM

## 2025-04-07 PROCEDURE — 97530 THERAPEUTIC ACTIVITIES: CPT

## 2025-04-07 PROCEDURE — 97112 NEUROMUSCULAR REEDUCATION: CPT

## 2025-04-07 NOTE — PROGRESS NOTES
Outpatient Rehab    Physical Therapy Visit    Patient Name: Shiloh Thayer  MRN: [id number]  YOB: 1965  Encounter Date: 4/7/2025    Therapy Diagnosis:   Encounter Diagnosis   Name Primary?    Status post total knee replacement, left Yes     Physician: Dewayne Vargas III, *    Physician Orders: Eval and Treat  Medical Diagnosis: Primary osteoarthritis of left knee    Visit # / Visits Authorized:  11 / 20  Insurance Authorization Period: 1/27/2025 to 1/27/2026  Date of Evaluation: 3/12/2025  Plan of Care Certification: 3/12/2025 to 5/2/2025     PT/PTA: PT   Number of PTA visits since last PT visit:0  Time In: 0800   Time Out: 0902  Total Time: 62 minutes  Total Billable Time:  62 minutes    FOTO: See media section.      Subjective   Patient reports that she feels like she is improving each day..  Family / care giver present for this visit:   Pain reported as 0/10. Left knee    Objective       Knee Range of Motion   Left Knee   Active (deg) Passive (deg) Pain   Flexion 105 112 Yes   Extension                         Treatment:  Balance/Neuromuscular Re-Education  NMR 1: Patient education: HEP compliance, swelling management, ROM expectations post TKA, importance of extension ROM for proper gait cycle and weight bearing  NMR 2: Standing TKE with ball; 10 second hold, 30 reps  NMR 4: Heel slides to improve tissue extensibility and ROM: 10 second hold, 5 minutes  NMR 7: Recumbent Bike at half to full revolutions, seat 15 for 5 minutes to improve tissue mobility  Therapeutic Activity  TA 2: Step ups onto 4-inch step: 3 x 10 reps with BUE assist  TA 5: [employer] navigation (8-inch) to improve community ambulation: 5 laps, forward and lateral  TA 6: Nustep for gait mechanics and CV endurance; 5 minutes, Level 5    Time Entry(in minutes):  Neuromuscular Re-Education Time Entry: 23  Therapeutic Activity Time Entry: 38    Assessment & Plan   Assessment: Shiloh is 4 weeks, 0 days s/p Left TKA. She presents  ambulating with single point cane. She demonstrates improved functional mobility with step marlon navigation and bike. Still ambulates with decreased gait speed. Measured 105 degrees AROM, 112 degrees AAROM Flexion post session. Will continue to progress as tolerated.  Evaluation/Treatment Tolerance: Patient tolerated treatment well    Patient will continue to benefit from skilled outpatient physical therapy to address the deficits listed in the problem list box on initial evaluation, provide pt/family education and to maximize pt's level of independence in the home and community environment.     Patient's spiritual, cultural, and educational needs considered and patient agreeable to plan of care and goals.           Plan: Consider reciprocal marlon navigation to encourage SL weightbearing.    Goals:   L TKA prehab Problems       L TKA prehab Problems (Resolved)       Functional outcome       Patient will demonstrate independence in home program for support of progression (Met)       Start:  02/03/25    Expected End:  03/31/25    Resolved:  02/03/25              L s/p TKA Problems       L s/p TKA Problems (Active)       Functional outcome       Patient will show a significant change in FOTO patient-reported outcome tool to demonstrate subjective improvement (Progressing)       Start:  03/14/25    Expected End:  05/02/25            Patient stated goal: to return to prior leve lof function with out pain  (Progressing)       Start:  03/14/25    Expected End:  05/02/25            Patient will demonstrate independence in home program for support of progression (Progressing)       Start:  03/14/25    Expected End:  04/18/25               Pain       Patient will report a 2 point reduction in pain while performing stair climbing to demonstrate improved activity tolerance (Progressing)       Start:  03/14/25    Expected End:  04/18/25               Range of Motion       Patient will achieve left knee ROM of  0-120 degrees   (Progressing)       Start:  03/14/25    Expected End:  04/18/25                   Rere Knight PT, DPT

## 2025-04-09 ENCOUNTER — CLINICAL SUPPORT (OUTPATIENT)
Dept: REHABILITATION | Facility: HOSPITAL | Age: 60
End: 2025-04-09
Payer: OTHER GOVERNMENT

## 2025-04-09 DIAGNOSIS — Z96.652 STATUS POST TOTAL KNEE REPLACEMENT, LEFT: Primary | ICD-10-CM

## 2025-04-09 PROCEDURE — 97530 THERAPEUTIC ACTIVITIES: CPT | Mod: CQ

## 2025-04-09 NOTE — PROGRESS NOTES
Outpatient Rehab    Physical Therapy Visit    Patient Name: Shiloh Thayer  MRN: [id number]  YOB: 1965  Encounter Date: 4/9/2025    Therapy Diagnosis:   Encounter Diagnosis   Name Primary?    Status post total knee replacement, left Yes     Physician: Dewayne Vargas III, *    Physician Orders: Eval and Treat  Medical Diagnosis: Primary osteoarthritis of left knee    Visit # / Visits Authorized:  12 / 20  Insurance Authorization Period: 1/27/2025 to 1/27/2026  Date of Evaluation: 3/12/2025  Plan of Care Certification: 3/12/2025 to 5/2/2025     PT/PTA: PTA   Number of PTA visits since last PT visit: 1  Time In: 0755   Time Out: 0904  Total Time: 69 minutes  Total Billable Time: 69 minutes    FOTO:  Intake Score: 29%  Survey Score 1: 61%  Survey Score 2: 66%    Subjective   Patient reports no new complaints today.  Family / care giver present for this visit:  ()  Pain reported as 0/10. Left knee    Objective  Objective Measures updated at progress report unless specified.     Treatment:  Therapeutic Activity  TA 1: Sit to  std chair + airex: 3 x 10 reps  TA 2: Step ups onto 4-inch step: 3 x 10 reps with BUE assist  TA 3: Patient education: HEP compliance, swelling management, ROM expectations post TKA, importance of extension ROM for proper gait cycle and weight bearing  TA 4: Shuttle DL Press: 62.5 lbs, 3 x 10 reps -- SL Press: 25 lbs, 3 x 10 reps with 3 sec tke  TA 5: [employer] navigation (9-inch) to improve community ambulation: 5 laps, forward with reciprocal gait pattern to encourage weightbearing through Left LE  TA 6: Nustep for gait mechanics and CV endurance; 5 minutes, Level 5 - Recumbent Bike, full revolutions at seat 14 x 5 minutes    Time Entry(in minutes):  Therapeutic Activity Time Entry: 69    Assessment & Plan   Assessment: Shiloh is 4 weeks, 1 day s/p Left TKA. She presents ambulating with single point cane. Performed hurdles with reciprocal gait pattern to encourage  weight bearing through Left LE. Overall good tolerance to interventions performed noting adequate muscle response.  Evaluation/Treatment Tolerance: Patient tolerated treatment well    Patient will continue to benefit from skilled outpatient physical therapy to address the deficits listed in the problem list box on initial evaluation, provide pt/family education and to maximize pt's level of independence in the home and community environment.     Patient's spiritual, cultural, and educational needs considered and patient agreeable to plan of care and goals.           Plan: Will continue per POC towards treatment goals. PT/PTA met face to face to discuss patient's treatment plan and progress towards established goals. Patient will be seen by physical therapist every sixth visit and minimally once per month.    Goals:   L TKA prehab Problems       L TKA prehab Problems (Resolved)       Functional outcome       Patient will demonstrate independence in home program for support of progression (Met)       Start:  02/03/25    Expected End:  03/31/25    Resolved:  02/03/25              L s/p TKA Problems       L s/p TKA Problems (Active)       Functional outcome       Patient will show a significant change in FOTO patient-reported outcome tool to demonstrate subjective improvement (Progressing)       Start:  03/14/25    Expected End:  05/02/25            Patient stated goal: to return to prior leve lof function with out pain  (Progressing)       Start:  03/14/25    Expected End:  05/02/25            Patient will demonstrate independence in home program for support of progression (Progressing)       Start:  03/14/25    Expected End:  04/18/25               Pain       Patient will report a 2 point reduction in pain while performing stair climbing to demonstrate improved activity tolerance (Progressing)       Start:  03/14/25    Expected End:  04/18/25               Range of Motion       Patient will achieve left knee ROM of   0-120 degrees  (Progressing)       Start:  03/14/25    Expected End:  04/18/25                   Sissy Garcia, PTA

## 2025-04-10 ENCOUNTER — TELEPHONE (OUTPATIENT)
Dept: INTERNAL MEDICINE | Facility: CLINIC | Age: 60
End: 2025-04-10
Payer: OTHER GOVERNMENT

## 2025-04-10 ENCOUNTER — HOSPITAL ENCOUNTER (OUTPATIENT)
Dept: RADIOLOGY | Facility: HOSPITAL | Age: 60
Discharge: HOME | End: 2025-04-10
Attending: FAMILY MEDICINE
Payer: OTHER GOVERNMENT

## 2025-04-10 DIAGNOSIS — Z12.31 OTHER SCREENING MAMMOGRAM: ICD-10-CM

## 2025-04-10 PROCEDURE — 77063 BREAST TOMOSYNTHESIS BI: CPT | Mod: TC

## 2025-04-10 NOTE — TELEPHONE ENCOUNTER
----- Message from Med Assistant Bender sent at 4/10/2025 12:51 PM CDT -----  Contact: [phone]  Type: Lab Orders Request prior to Annual Visit.Caller:Shiloh (pt)Reason For Call:pt is requesting provider to place lab orders in chart prior to Annual visit and to give a call back once orders are in chart.Best Call Back: [phone]

## 2025-04-11 ENCOUNTER — LAB VISIT (OUTPATIENT)
Dept: LAB | Facility: HOSPITAL | Age: 60
End: 2025-04-11
Payer: OTHER GOVERNMENT

## 2025-04-11 ENCOUNTER — CLINICAL SUPPORT (OUTPATIENT)
Dept: REHABILITATION | Facility: HOSPITAL | Age: 60
End: 2025-04-11
Payer: OTHER GOVERNMENT

## 2025-04-11 DIAGNOSIS — I10 HYPERTENSION: ICD-10-CM

## 2025-04-11 DIAGNOSIS — Z96.652 STATUS POST TOTAL KNEE REPLACEMENT, LEFT: Primary | ICD-10-CM

## 2025-04-11 LAB
ALBUMIN SERPL BCP-MCNC: 3.7 G/DL (ref 3.5–5.2)
ALP SERPL-CCNC: 68 UNIT/L (ref 40–150)
ALT SERPL W/O P-5'-P-CCNC: 14 UNIT/L (ref 10–44)
ANION GAP (OHS): 8 MMOL/L (ref 8–16)
AST SERPL-CCNC: 16 UNIT/L (ref 11–45)
BILIRUB SERPL-MCNC: 0.5 MG/DL (ref 0.1–1)
BUN SERPL-MCNC: 16 MG/DL (ref 6–20)
CALCIUM SERPL-MCNC: 9.3 MG/DL (ref 8.7–10.5)
CHLORIDE SERPL-SCNC: 104 MMOL/L (ref 95–110)
CO2 SERPL-SCNC: 29 MMOL/L (ref 23–29)
CREAT SERPL-MCNC: 0.9 MG/DL (ref 0.5–1.4)
GFR SERPLBLD CREATININE-BSD FMLA CKD-EPI: >60 ML/MIN/1.73/M2
GLUCOSE SERPL-MCNC: 93 MG/DL (ref 70–110)
POTASSIUM SERPL-SCNC: 3.9 MMOL/L (ref 3.5–5.1)
PROT SERPL-MCNC: 7.4 GM/DL (ref 6–8.4)
SODIUM SERPL-SCNC: 141 MMOL/L (ref 136–145)

## 2025-04-11 PROCEDURE — 36415 COLL VENOUS BLD VENIPUNCTURE: CPT

## 2025-04-11 PROCEDURE — 97530 THERAPEUTIC ACTIVITIES: CPT

## 2025-04-11 PROCEDURE — 97112 NEUROMUSCULAR REEDUCATION: CPT

## 2025-04-11 PROCEDURE — 84075 ASSAY ALKALINE PHOSPHATASE: CPT

## 2025-04-14 ENCOUNTER — TELEPHONE (OUTPATIENT)
Dept: INTERNAL MEDICINE | Facility: CLINIC | Age: 60
End: 2025-04-14
Payer: OTHER GOVERNMENT

## 2025-04-14 NOTE — PROGRESS NOTES
Outpatient Rehab    Physical Therapy Visit    Patient Name: Shiloh Thayer  MRN: [id number]  YOB: 1965  Encounter Date: 4/11/2025    Therapy Diagnosis:   Encounter Diagnosis   Name Primary?    Status post total knee replacement, left Yes     Physician: Dewayne Vargas III, *    Physician Orders: Eval and Treat  Medical Diagnosis: Primary osteoarthritis of left knee    Visit # / Visits Authorized:  13 / 20  Insurance Authorization Period: 1/27/2025 to 1/27/2026  Date of Evaluation: 3/12/2025  Plan of Care Certification: 3/12/2025 to 5/2/2025     PT/PTA: PT   Number of PTA visits since last PT visit:0  Time In: 0703   Time Out: 0804  Total Time: 61 minutes  Total Billable Time:  61 minutes    FOTO: See media section.        Subjective   Patient reports no new complaints today.  Family / care giver present for this visit:   Pain reported as 0/10. Left knee    Objective       Knee Range of Motion   Left Knee   Active (deg) Passive (deg) Pain   Flexion 114 120     Extension                         Treatment:  Balance/Neuromuscular Re-Education  NMR 1: Patient education: HEP compliance, swelling management, ROM expectations post TKA, importance of extension ROM for proper gait cycle and weight bearing  NMR 7: Recumbent Bike at half to full revolutions, seat 15 for 5 minutes to improve tissue mobility  Therapeutic Activity  TA 1: Sit to  std chair: 3 x 10 reps  TA 2: Step ups onto 6-inch step: 2 x 10 reps with BUE assist  TA 4: Shuttle DL Press: 62.5 lbs, 3 x 10 reps -- SL Press: 25 lbs, 3 x 10 reps with 3 sec tke  TA 5: [employer] navigation (9-inch) to improve community ambulation: 5 laps with step-to gait, 5 laps, forward with reciprocal gait pattern to encourage weightbearing through Left LE  TA 6: Nustep for gait mechanics and CV endurance; 5 minutes, Level 5     Time Entry(in minutes):  Neuromuscular Re-Education Time Entry: 8  Therapeutic Activity Time Entry: 53    Assessment & Plan    Assessment: Shiloh is 4 weeks, 4 day s/p Left TKA. She presents ambulating with single point cane. She demonstrates improved ambulation and gait mechanics. She tolerated today's session well with emphasis on functional movements. Will continue to progress as tolerated.  Evaluation/Treatment Tolerance: Patient tolerated treatment well    Patient will continue to benefit from skilled outpatient physical therapy to address the deficits listed in the problem list box on initial evaluation, provide pt/family education and to maximize pt's level of independence in the home and community environment.     Patient's spiritual, cultural, and educational needs considered and patient agreeable to plan of care and goals.           Plan:      Goals:   L TKA prehab Problems       L TKA prehab Problems (Resolved)       Functional outcome       Patient will demonstrate independence in home program for support of progression (Met)       Start:  02/03/25    Expected End:  03/31/25    Resolved:  02/03/25              L s/p TKA Problems       L s/p TKA Problems (Active)       Functional outcome       Patient will show a significant change in FOTO patient-reported outcome tool to demonstrate subjective improvement (Progressing)       Start:  03/14/25    Expected End:  05/02/25            Patient stated goal: to return to prior leve lof function with out pain  (Progressing)       Start:  03/14/25    Expected End:  05/02/25            Patient will demonstrate independence in home program for support of progression (Progressing)       Start:  03/14/25    Expected End:  04/18/25               Pain       Patient will report a 2 point reduction in pain while performing stair climbing to demonstrate improved activity tolerance (Progressing)       Start:  03/14/25    Expected End:  04/18/25               Range of Motion       Patient will achieve left knee ROM of  0-120 degrees  (Progressing)       Start:  03/14/25    Expected End:  04/18/25                    Rere Knight, PT, DPT

## 2025-04-14 NOTE — TELEPHONE ENCOUNTER
Dr Au will be in a meeting and would to move the patient from 1:20 PM to the morning slot at 10 on the same day. Left a message for the pt to call back

## 2025-04-15 ENCOUNTER — CLINICAL SUPPORT (OUTPATIENT)
Dept: REHABILITATION | Facility: HOSPITAL | Age: 60
End: 2025-04-15
Payer: OTHER GOVERNMENT

## 2025-04-15 DIAGNOSIS — Z96.652 STATUS POST TOTAL KNEE REPLACEMENT, LEFT: Primary | ICD-10-CM

## 2025-04-15 PROCEDURE — 97112 NEUROMUSCULAR REEDUCATION: CPT | Mod: CQ

## 2025-04-15 PROCEDURE — 97530 THERAPEUTIC ACTIVITIES: CPT | Mod: CQ

## 2025-04-15 NOTE — PROGRESS NOTES
Outpatient Rehab    Physical Therapy Visit    Patient Name: Shiloh Thayer  MRN: [id number]  YOB: 1965  Encounter Date: 4/15/2025    Therapy Diagnosis:   Encounter Diagnosis   Name Primary?    Status post total knee replacement, left Yes     Physician: Dewayne Vargas III, *    Physician Orders: Eval and Treat  Medical Diagnosis: Primary osteoarthritis of left knee    Visit # / Visits Authorized:  14 / 20  Insurance Authorization Period: 1/27/2025 to 1/27/2026  Date of Evaluation: 3/12/2025  Plan of Care Certification: 3/12/2025 to 5/2/2025     PT/PTA: PTA   Number of PTA visits since last PT visit: 1  Time In: 0752   Time Out: 0854  Total Time: 62 minutes  Total Billable Time: 62 minutes    Subjective   Patient reports she has no knee pain, however notes calf tightness and tightness in her knee joint.  Family / care giver present for this visit:   Pain reported as 0/10. Left knee    Objective  Objective Measures updated at progress report unless specified.      Treatment:  Balance/Neuromuscular Re-Education  NMR 1: Patient education: HEP compliance, swelling management, ROM expectations post TKA, importance of extension ROM for proper gait cycle and weight bearing  NMR 3: Mini squats at CC; 3 x 10 reps with staggered stance to promote weight distribution on to LLE  Therapeutic Activity  TA 2: Step ups onto 6-inch step: 3 x 10 reps with BUE assist -- Lateral step ups onto 6-inch step: 3 x 10 reps  TA 3: Patient education: HEP compliance, swelling management, ROM expectations post TKA, importance of extension ROM for proper gait cycle and weight bearing  TA 4: Shuttle DL Press: 62.5 lbs, 3 x 10 reps -- SL Press: 25 lbs, 3 x 10 reps with 3 sec tke  TA 5: [employer] navigation (9-inch) to improve community ambulation: 5 laps with step-to gait, 5 laps, forward with reciprocal gait pattern to encourage weightbearing through Left LE  TA 6: Nustep for gait mechanics and CV endurance; 5 minutes, Level 5 -  Recumbent Bike, full revolutions at seat 14 x 5 minutes    Time Entry(in minutes):  Neuromuscular Re-Education Time Entry: 8  Therapeutic Activity Time Entry: 54    Assessment & Plan   Assessment: Overall good tolerance to therapeutic interventions noting adequate muscle response throughout. Addition of mini squats to promote weight bearing through Left LE. She is progressing well with current TKA protocol.  Evaluation/Treatment Tolerance: Patient tolerated treatment well    Patient will continue to benefit from skilled outpatient physical therapy to address the deficits listed in the problem list box on initial evaluation, provide pt/family education and to maximize pt's level of independence in the home and community environment.     Patient's spiritual, cultural, and educational needs considered and patient agreeable to plan of care and goals.           Plan: Will continue per POC towards treatment goals. PT/PTA met face to face to discuss patient's treatment plan and progress towards established goals. Patient will be seen by physical therapist every sixth visit and minimally once per month.    Goals:   L TKA prehab Problems       L TKA prehab Problems (Resolved)       Functional outcome       Patient will demonstrate independence in home program for support of progression (Met)       Start:  02/03/25    Expected End:  03/31/25    Resolved:  02/03/25              L s/p TKA Problems       L s/p TKA Problems (Active)       Functional outcome       Patient will show a significant change in FOTO patient-reported outcome tool to demonstrate subjective improvement (Progressing)       Start:  03/14/25    Expected End:  05/02/25            Patient stated goal: to return to prior leve lof function with out pain  (Progressing)       Start:  03/14/25    Expected End:  05/02/25            Patient will demonstrate independence in home program for support of progression (Progressing)       Start:  03/14/25    Expected End:   04/18/25               Pain       Patient will report a 2 point reduction in pain while performing stair climbing to demonstrate improved activity tolerance (Progressing)       Start:  03/14/25    Expected End:  04/18/25               Range of Motion       Patient will achieve left knee ROM of  0-120 degrees  (Progressing)       Start:  03/14/25    Expected End:  04/18/25                   Sissy Garcia, PTA

## 2025-04-17 ENCOUNTER — CLINICAL SUPPORT (OUTPATIENT)
Dept: REHABILITATION | Facility: HOSPITAL | Age: 60
End: 2025-04-17
Payer: OTHER GOVERNMENT

## 2025-04-17 DIAGNOSIS — Z96.652 STATUS POST TOTAL KNEE REPLACEMENT, LEFT: Primary | ICD-10-CM

## 2025-04-17 PROCEDURE — 97112 NEUROMUSCULAR REEDUCATION: CPT

## 2025-04-17 PROCEDURE — 97530 THERAPEUTIC ACTIVITIES: CPT

## 2025-04-17 NOTE — PROGRESS NOTES
"  Outpatient Rehab    Physical Therapy Visit    Patient Name: Shiloh Thayer  MRN: [id number]  YOB: 1965  Encounter Date: 4/17/2025    Therapy Diagnosis:   Encounter Diagnosis   Name Primary?    Status post total knee replacement, left Yes     Physician: Dewayne Vargas III, *    Physician Orders: Eval and Treat  Medical Diagnosis: Primary osteoarthritis of left knee    Visit # / Visits Authorized:  15 / 20  Insurance Authorization Period: 1/27/2025 to 1/27/2026  Date of Evaluation: 3/12/2025  Plan of Care Certification: 3/12/2025 to 5/2/2025     PT/PTA: PT   Number of PTA visits since last PT visit:0  Time In: 0750   Time Out: 0851  Total Time: 61   Total Billable Time:  61 minutes    FOTO: See media section.        Subjective   Patient reports that she has to "think about bending her knee" when walking..  Family / care giver present for this visit:   Pain reported as 0/10. Left knee    Objective            Treatment:  Balance/Neuromuscular Re-Education  NMR 1: Patient education: HEP compliance, swelling management, ROM expectations post TKA, importance of extension ROM for proper gait cycle and weight bearing  NMR 2: Standing TKE with ball; 10 second hold, 30 reps  NMR 7: Recumbent Bike at half to full revolutions, seat 15 for 5 minutes to improve tissue mobility  Therapeutic Activity  TA 2: Step ups onto 6-inch step: 3 x 10 reps with BUE assist  TA 4: Shuttle DL Press: 62.5 lbs, 3 x 10 reps -- SL Press: 37.5 lbs, 3 x 10 reps with 3 sec tke  TA 5: [employer] navigation (12-inch) to improve community ambulation: 5 laps with step-to gait, 5 laps, forward with reciprocal gait pattern to encourage weightbearing through Left LE  TA 6: Nustep for gait mechanics and CV endurance; 5 minutes, Level 5 - Recumbent Bike, full revolutions at seat 14 x 5 minutes    Time Entry(in minutes):  Neuromuscular Re-Education Time Entry: 23  Therapeutic Activity Time Entry: 38    Assessment & Plan   Assessment: Shiloh is 5 " weeks, 3 days s/p L TKA. She ambulates with single-point cane. Demonstrates improved functional mobility. She has slightly antalgic gait when ambulating. Able to achieve 105-110 degrees AROM. Will continue to progress as tolerated.  Evaluation/Treatment Tolerance: Patient tolerated treatment well    Patient will continue to benefit from skilled outpatient physical therapy to address the deficits listed in the problem list box on initial evaluation, provide pt/family education and to maximize pt's level of independence in the home and community environment.     Patient's spiritual, cultural, and educational needs considered and patient agreeable to plan of care and goals.           Plan: Consider prone knee flexion stretch next visit    Goals:   L TKA prehab Problems       L TKA prehab Problems (Resolved)       Functional outcome       Patient will demonstrate independence in home program for support of progression (Met)       Start:  02/03/25    Expected End:  03/31/25    Resolved:  02/03/25              L s/p TKA Problems       L s/p TKA Problems (Active)       Functional outcome       Patient will show a significant change in FOTO patient-reported outcome tool to demonstrate subjective improvement (Progressing)       Start:  03/14/25    Expected End:  05/02/25            Patient stated goal: to return to prior leve lof function with out pain  (Progressing)       Start:  03/14/25    Expected End:  05/02/25            Patient will demonstrate independence in home program for support of progression (Progressing)       Start:  03/14/25    Expected End:  04/18/25               Pain       Patient will report a 2 point reduction in pain while performing stair climbing to demonstrate improved activity tolerance (Progressing)       Start:  03/14/25    Expected End:  04/18/25               Range of Motion       Patient will achieve left knee ROM of  0-120 degrees  (Progressing)       Start:  03/14/25    Expected End:   04/18/25                   Rere Knight, PT, DPT

## 2025-04-22 ENCOUNTER — OFFICE VISIT (OUTPATIENT)
Dept: ORTHOPEDICS | Facility: CLINIC | Age: 60
End: 2025-04-22
Payer: OTHER GOVERNMENT

## 2025-04-22 ENCOUNTER — HOSPITAL ENCOUNTER (OUTPATIENT)
Dept: RADIOLOGY | Facility: HOSPITAL | Age: 60
Discharge: HOME | End: 2025-04-22
Attending: NURSE PRACTITIONER
Payer: OTHER GOVERNMENT

## 2025-04-22 ENCOUNTER — CLINICAL SUPPORT (OUTPATIENT)
Dept: REHABILITATION | Facility: HOSPITAL | Age: 60
End: 2025-04-22
Payer: OTHER GOVERNMENT

## 2025-04-22 DIAGNOSIS — Z96.652 STATUS POST TOTAL KNEE REPLACEMENT, LEFT: Primary | ICD-10-CM

## 2025-04-22 DIAGNOSIS — Z96.652 S/P TOTAL KNEE REPLACEMENT, LEFT: Primary | ICD-10-CM

## 2025-04-22 DIAGNOSIS — Z96.652 S/P TOTAL KNEE REPLACEMENT, LEFT: ICD-10-CM

## 2025-04-22 PROCEDURE — 99024 POSTOP FOLLOW-UP VISIT: CPT | Mod: S$GLB,,, | Performed by: NURSE PRACTITIONER

## 2025-04-22 PROCEDURE — 97112 NEUROMUSCULAR REEDUCATION: CPT | Mod: CQ

## 2025-04-22 PROCEDURE — 73562 X-RAY EXAM OF KNEE 3: CPT | Mod: 26,LT,, | Performed by: RADIOLOGY

## 2025-04-22 PROCEDURE — 97530 THERAPEUTIC ACTIVITIES: CPT | Mod: CQ

## 2025-04-22 PROCEDURE — 99999 PR PBB SHADOW E&M-EST. PATIENT-LVL III: CPT | Mod: PBBFAC,,, | Performed by: NURSE PRACTITIONER

## 2025-04-22 PROCEDURE — 73562 X-RAY EXAM OF KNEE 3: CPT | Mod: TC,LT

## 2025-04-22 RX ORDER — CELECOXIB 200 MG/1
200 CAPSULE ORAL DAILY
Qty: 30 CAPSULE | Refills: 0 | Status: SHIPPED | OUTPATIENT
Start: 2025-04-22

## 2025-04-22 NOTE — PROGRESS NOTES
"  Outpatient Rehab    Physical Therapy Visit    Patient Name: Shiloh Thayer  MRN: [id number]  YOB: 1965  Encounter Date: 4/22/2025    Therapy Diagnosis:   Encounter Diagnosis   Name Primary?    Status post total knee replacement, left Yes     Physician: Dewayne Vargas III, *    Physician Orders: Eval and Treat  Medical Diagnosis: Primary osteoarthritis of left knee    Visit # / Visits Authorized:  16 / 20  Insurance Authorization Period: 1/27/2025 to 1/27/2026  Date of Evaluation: 3/12/2025  Plan of Care Certification: 3/12/2025 to 5/2/2025     PT/PTA: PTA   Number of PTA visits since last PT visit: 1  Time In: 0753   Time Out: 0859  Total Time: 66 minutes  Total Billable Time: 66 minutes    Subjective   Patient reports "I feel like I am going backwards." States that she is having posterior/lateral Left knee discomfort and pain along the front and medial side of her knee. Reports that she feels like she stood a bit more and sat more over the weekend and felt herself getting "stiff and swollen." She denies additional activities. Patient sees MD after PT today for 6 week follow up; she is in agreement for discharge on Thursday.  Family / care giver present for this visit:   Pain reported as 4/10. Left knee    Objective   Range of Motion:   Knee Left Active   Flexion Post: 110 degrees AROM/PROM     Treatment:  Balance/Neuromuscular Re-Education  NMR 1: Patient education: HEP compliance, swelling management, ROM expectations post TKA, importance of extension ROM for proper gait cycle and weight bearing  NMR 5: Ambulation with and without SPC in clinic with emphasis on proper gait cycle: ~250 ft in clinic  NMR 7: Prone quad stretch: 30 second hold, 4 reps LLE  Therapeutic Activity  TA 1: Sit to  std chair: 3 x 10 reps holding 5lb DB  TA 2: Step ups onto 6-inch step: 3 x 10 reps with BUE assist -- Lateral step ups onto 6-inch step: 3 x 10 reps LLE -- Reciprocal stair navigation: 10 laps  TA 3: " Patient education: HEP compliance, swelling management, ROM expectations post TKA, importance of extension ROM for proper gait cycle and weight bearing  TA 4: Shuttle DL Press: 62.5 lbs, 3 x 10 reps -- SL Press: 37.5 lbs, 3 x 10 reps with 3 sec tke  TA 5: [employer] navigation (12-inch) to improve community ambulation: 10 laps forward with reciprocal gait pattern to encourage weightbearing through Left LE  TA 6: Nustep for gait mechanics and CV endurance; 5 minutes, Level 5 - Recumbent Bike, full revolutions at seat 14 x 5 minutes, Level 5    Time Entry(in minutes):  Neuromuscular Re-Education Time Entry: 23  Therapeutic Activity Time Entry: 43    Assessment & Plan   Assessment: Shiloh is 6 weeks s/p Left TKA. She presents ambulating with single point cane. Continues to compensate with marlon navigation likely due to decreased knee flexion ROM; she responds well to cueing for correction. Measured 110 degrees Flexion post session. Plan for discharge next visit by supervising PT  Evaluation/Treatment Tolerance: Patient tolerated treatment well    Patient will continue to benefit from skilled outpatient physical therapy to address the deficits listed in the problem list box on initial evaluation, provide pt/family education and to maximize pt's level of independence in the home and community environment.     Patient's spiritual, cultural, and educational needs considered and patient agreeable to plan of care and goals.           Plan: Will continue per POC towards treatment goals. PT/PTA met face to face to discuss patient's treatment plan and progress towards established goals. Patient will be seen by physical therapist every sixth visit and minimally once per month.    Goals:   L TKA prehab Problems       L TKA prehab Problems (Resolved)       Functional outcome       Patient will demonstrate independence in home program for support of progression (Met)       Start:  02/03/25    Expected End:  03/31/25    Resolved:   02/03/25              L s/p TKA Problems       L s/p TKA Problems (Active)       Functional outcome       Patient will show a significant change in FOTO patient-reported outcome tool to demonstrate subjective improvement (Progressing)       Start:  03/14/25    Expected End:  05/02/25            Patient stated goal: to return to prior leve lof function with out pain  (Progressing)       Start:  03/14/25    Expected End:  05/02/25            Patient will demonstrate independence in home program for support of progression (Progressing)       Start:  03/14/25    Expected End:  04/18/25               Pain       Patient will report a 2 point reduction in pain while performing stair climbing to demonstrate improved activity tolerance (Progressing)       Start:  03/14/25    Expected End:  04/18/25               Range of Motion       Patient will achieve left knee ROM of  0-120 degrees  (Progressing)       Start:  03/14/25    Expected End:  04/18/25                   Sissy Garcia, PTA

## 2025-04-24 ENCOUNTER — CLINICAL SUPPORT (OUTPATIENT)
Dept: REHABILITATION | Facility: HOSPITAL | Age: 60
End: 2025-04-24
Payer: OTHER GOVERNMENT

## 2025-04-24 DIAGNOSIS — Z96.652 STATUS POST TOTAL KNEE REPLACEMENT, LEFT: Primary | ICD-10-CM

## 2025-04-24 PROCEDURE — 97112 NEUROMUSCULAR REEDUCATION: CPT

## 2025-04-24 PROCEDURE — 97530 THERAPEUTIC ACTIVITIES: CPT

## 2025-04-24 NOTE — PROGRESS NOTES
Outpatient Rehab    Physical Therapy Discharge    Patient Name: Shiloh Thayer  MRN: [id number]  YOB: 1965  Encounter Date: 4/24/2025    Therapy Diagnosis:   Encounter Diagnosis   Name Primary?    Status post total knee replacement, left Yes     Physician: Dewayne Vargas III, *    Physician Orders: Eval and Treat  Medical Diagnosis: Primary osteoarthritis of left knee    Visit # / Visits Authorized:  17 / 20  Insurance Authorization Period: 1/27/2025 to 1/27/2026  Date of Evaluation: 3/12/2025  Plan of Care Certification: 3/12/2025 to 5/2/2025     PT/PTA: PT   Number of PTA visits since last PT visit:0  Time In: 0804   Time Out: 0900  Total Time: 56   Total Billable Time:  56 minutes    FOTO: See media section.          Subjective   Patient reports that she has been trying to do more steps at home. She is agreeable to discharge today..  Family / care giver present for this visit:     Left knee    Objective       Knee Range of Motion   Left Knee   Active (deg) Passive (deg) Pain   Flexion 109 114     Extension                         Timed Up & Go (TUG)  Time: 15 seconds  Observations: Slow tentative pace  An older adult who takes >=12 seconds to complete the TUG is at risk for falling.       Sit to Stand Testing      The patient completed 10 repetitions of a sit to stand transfer in 30 seconds.                Treatment:  Balance/Neuromuscular Re-Education  NMR 1: Patient education: HEP compliance, swelling management, ROM expectations post TKA, importance of extension ROM for proper gait cycle and weight bearing  Therapeutic Activity  TA 2: Step ups onto 6-inch step: 3 x 10 reps with BUE assist  TA 3: Stair navigation in standard stairwell: 1 lap (reciprocal gait ascending, step-to gait descending)  TA 4: Shuttle DL Press: 62.5 lbs, 3 x 10 reps -- SL Press: 37.5 lbs, 3 x 10 reps with 3 sec tke  TA 6: Nustep for gait mechanics and CV endurance; 5 minutes, Level 5 - Recumbent Bike, full revolutions  at seat 14 x 5 minutes, Level 5    Time Entry(in minutes):  Neuromuscular Re-Education Time Entry: 15  Therapeutic Activity Time Entry: 41    Assessment & Plan   Assessment: Shiloh is 6 weeks, 2 days s/p Left TKA. She presents ambulating with single point cane. She demonstrates improved functional mobility with ambulation and sit to stand transfers. She continues to demonstrate difficulty with descending stairs with reciprocal gait, but feels confident in practicing stair navigation at [facility]. She is agreeable to discharge with home exercise program at this time.  Evaluation/Treatment Tolerance: Patient tolerated treatment well    Patient's spiritual, cultural, and educational needs considered and patient agreeable to plan of care and goals.     Education  Education was done with Patient. The patient's learning style includes Reading and Listening.                  Plan: Patient is discharged from outpatient PT at this time    Goals:   L TKA prehab Problems       L TKA prehab Problems (Resolved)       Functional outcome       Patient will demonstrate independence in home program for support of progression (Met)       Start:  02/03/25    Expected End:  03/31/25    Resolved:  02/03/25              L s/p TKA Problems       L s/p TKA Problems (Active)       Functional outcome       Patient will show a significant change in FOTO patient-reported outcome tool to demonstrate subjective improvement (Progressing)       Start:  03/14/25    Expected End:  05/02/25            Patient stated goal: to return to prior leve lof function with out pain  (Progressing)       Start:  03/14/25    Expected End:  05/02/25            Patient will demonstrate independence in home program for support of progression (Met)       Start:  03/14/25    Expected End:  04/18/25    Resolved:  04/24/25            Range of Motion       Patient will achieve left knee ROM of  0-120 degrees  (Progressing)       Start:  03/14/25    Expected End:  04/18/25                   L s/p TKA Problems (Resolved)       Pain       Patient will report a 2 point reduction in pain while performing stair climbing to demonstrate improved activity tolerance (Met)       Start:  03/14/25    Expected End:  04/18/25    Resolved:  04/24/25                Rere Knight, PT, DPT

## 2025-04-28 ENCOUNTER — PATIENT OUTREACH (OUTPATIENT)
Dept: INTERNAL MEDICINE | Facility: CLINIC | Age: 60
End: 2025-04-28
Payer: OTHER GOVERNMENT

## 2025-04-28 VITALS — SYSTOLIC BLOOD PRESSURE: 137 MMHG | DIASTOLIC BLOOD PRESSURE: 61 MMHG

## 2025-05-09 ENCOUNTER — OFFICE VISIT (OUTPATIENT)
Dept: INTERNAL MEDICINE | Facility: CLINIC | Age: 60
End: 2025-05-09
Payer: OTHER GOVERNMENT

## 2025-05-09 VITALS
HEIGHT: 68 IN | BODY MASS INDEX: 34.89 KG/M2 | OXYGEN SATURATION: 99 % | SYSTOLIC BLOOD PRESSURE: 120 MMHG | DIASTOLIC BLOOD PRESSURE: 74 MMHG | WEIGHT: 230.19 LBS | HEART RATE: 74 BPM

## 2025-05-09 DIAGNOSIS — Z00.00 ENCOUNTER FOR ANNUAL GENERAL MEDICAL EXAMINATION WITHOUT ABNORMAL FINDINGS IN ADULT: Primary | ICD-10-CM

## 2025-05-09 DIAGNOSIS — Z12.11 ENCOUNTER FOR SCREENING FOR MALIGNANT NEOPLASM OF COLON: ICD-10-CM

## 2025-05-09 DIAGNOSIS — Z12.39 ENCOUNTER FOR OTHER SCREENING FOR MALIGNANT NEOPLASM OF BREAST: ICD-10-CM

## 2025-05-09 DIAGNOSIS — E01.0 THYROMEGALY: ICD-10-CM

## 2025-05-09 DIAGNOSIS — I10 PRIMARY HYPERTENSION: ICD-10-CM

## 2025-05-09 DIAGNOSIS — E66.812 CLASS 2 SEVERE OBESITY DUE TO EXCESS CALORIES WITH SERIOUS COMORBIDITY AND BODY MASS INDEX (BMI) OF 35.0 TO 35.9 IN ADULT: ICD-10-CM

## 2025-05-09 DIAGNOSIS — E78.2 MIXED HYPERLIPIDEMIA: ICD-10-CM

## 2025-05-09 DIAGNOSIS — E66.01 CLASS 2 SEVERE OBESITY DUE TO EXCESS CALORIES WITH SERIOUS COMORBIDITY AND BODY MASS INDEX (BMI) OF 35.0 TO 35.9 IN ADULT: ICD-10-CM

## 2025-05-09 PROCEDURE — 99999 PR PBB SHADOW E&M-EST. PATIENT-LVL IV: CPT | Mod: PBBFAC,,, | Performed by: FAMILY MEDICINE

## 2025-05-09 PROCEDURE — 99396 PREV VISIT EST AGE 40-64: CPT | Mod: S$GLB,,, | Performed by: FAMILY MEDICINE

## 2025-05-09 NOTE — PROGRESS NOTES
[facility]  Annual Exam/Wellness Visit    Patient Information  Shiloh Thayer  59 y.o. female    PCP  Donnie Au MD    Reason for Visit  Annual Exam      Subjective:  History of Present Illness    CHIEF COMPLAINT:  Patient presents today for annual exam    She noticed a neck lump a few weeks ago. She denies pain in the area, sweating, or changes in appetite.    She underwent left knee replacement on March 10th. Her recovery began improving around 4 weeks post-surgery, with the initial week being most challenging. She completed physical therapy two weeks ago and continues home exercises. She currently uses a cane for balance when walking outside but manages without it inside. She experiences stiffness and limited range of motion in the operated knee but denies pain. Her range of motion is improved compared to pre-surgery.    Mammogram completed last month. Colonoscopy performed last year.         Health Maintenance         Date Due Completion Date    Shingles Vaccine (1 of 2) Never done ---    Pneumococcal Vaccines (Age 50+) (1 of 1 - PCV) Never done ---    Mammogram 04/10/2026 4/10/2025    Hemoglobin A1c (Diabetic Prevention Screening) 01/09/2027 1/9/2024    Lipid Panel 01/09/2029 1/9/2024    Colorectal Cancer Screening 07/23/2031 7/23/2024    TETANUS VACCINE 01/09/2034 1/9/2024    RSV Vaccine (Age 60+ and Pregnant patients) (1 - 1-dose 75+ series) 08/21/2040 ---            Review of Systems   Constitutional:  Negative for chills, fatigue and fever.   HENT:  Negative for nasal congestion, ear pain, postnasal drip and sore throat.    Eyes:  Negative for visual disturbance.   Respiratory:  Negative for cough, shortness of breath and wheezing.    Cardiovascular:  Negative for chest pain and palpitations.   Gastrointestinal:  Negative for abdominal pain, change in bowel habit, constipation, diarrhea, nausea and vomiting.   Genitourinary:  Negative for dysuria and hematuria.  "  Musculoskeletal:  Negative for back pain, leg pain and neck pain.   Neurological:  Negative for dizziness, numbness and headaches.   Hematological:  Positive for adenopathy.   Psychiatric/Behavioral:  Negative for dysphoric mood, sleep disturbance and suicidal ideas. The patient is not nervous/anxious.         Patient answers are not available for this visit.      Problem List and History  Problem List[1]  Past Medical History[2]  Past Surgical History[3]  Social History     Substance and Sexual Activity   Sexual Activity Yes    Partners: Male    Birth control/protection: See Surgical Hx     Tobacco Use History[4]  Social History     Substance and Sexual Activity   Alcohol Use Yes    Alcohol/week: 2.0 standard drinks of alcohol    Types: 2 Glasses of wine per week    Comment: once a month     Social History     Substance and Sexual Activity   Drug Use Not Currently       Medication List  Current Medications[5]    Objective:  Vitals:    05/09/25 1350   BP: 120/74   Pulse: 74   SpO2: 99%   Weight: 104.4 kg (230 lb 2.6 oz)   Height: 5' 8" (1.727 m)       Physical Exam  Vitals reviewed.   Constitutional:       General: She is not in acute distress.     Appearance: Normal appearance. She is obese. She is not ill-appearing.   HENT:      Head: Normocephalic and atraumatic.      Right Ear: Tympanic membrane, ear canal and external ear normal. There is no impacted cerumen.      Left Ear: Tympanic membrane, ear canal and external ear normal. There is no impacted cerumen.      Nose: Nose normal. No congestion or rhinorrhea.      Mouth/Throat:      Mouth: Mucous membranes are moist.      Pharynx: Oropharynx is clear. No oropharyngeal exudate or posterior oropharyngeal erythema.   Eyes:      General: No scleral icterus.        Right eye: No discharge.         Left eye: No discharge.      Conjunctiva/sclera: Conjunctivae normal.   Neck:      Thyroid: Thyromegaly present. No thyroid mass or thyroid tenderness.        Comments: " Poorly defined area of subtle, soft swelling in area marked  Cardiovascular:      Rate and Rhythm: Normal rate and regular rhythm.      Pulses: Normal pulses.      Heart sounds: Normal heart sounds. No murmur heard.     No friction rub. No gallop.   Pulmonary:      Effort: Pulmonary effort is normal. No respiratory distress.      Breath sounds: Normal breath sounds. No stridor. No wheezing, rhonchi or rales.   Abdominal:      General: Abdomen is flat. Bowel sounds are normal. There is no distension.      Palpations: Abdomen is soft. There is no mass.      Tenderness: There is no abdominal tenderness. There is no guarding.      Hernia: No hernia is present.   Musculoskeletal:         General: No swelling or deformity. Normal range of motion.      Cervical back: Normal range of motion and neck supple. No tenderness.   Lymphadenopathy:      Cervical: Cervical adenopathy present.   Skin:     General: Skin is warm and dry.   Neurological:      General: No focal deficit present.      Mental Status: She is alert and oriented to person, place, and time. Mental status is at baseline.      Gait: Gait normal.      Deep Tendon Reflexes: Reflexes normal.   Psychiatric:         Mood and Affect: Mood normal.         Behavior: Behavior normal.         Thought Content: Thought content normal.         Judgment: Judgment normal.         Lab Results  CBC  WBC   Date/Time Value Ref Range Status   02/14/2025 09:11 AM 4.48 3.90 - 12.70 K/uL Final     Hemoglobin   Date/Time Value Ref Range Status   02/14/2025 09:11 AM 12.5 12.0 - 16.0 g/dL Final     Hematocrit   Date/Time Value Ref Range Status   02/14/2025 09:11 AM 39.3 37.0 - 48.5 % Final     MCV   Date/Time Value Ref Range Status   02/14/2025 09:11 AM 91 82 - 98 fL Final     Platelets   Date/Time Value Ref Range Status   02/14/2025 09:11  150 - 450 K/uL Final       CMP  Sodium   Date/Time Value Ref Range Status   04/11/2025 08:41  136 - 145 mmol/L Final   02/14/2025 09:11 AM  140 136 - 145 mmol/L Final     Potassium   Date/Time Value Ref Range Status   04/11/2025 08:41 AM 3.9 3.5 - 5.1 mmol/L Final   02/14/2025 09:11 AM 4.1 3.5 - 5.1 mmol/L Final     Chloride   Date/Time Value Ref Range Status   04/11/2025 08:41  95 - 110 mmol/L Final   02/14/2025 09:11  95 - 110 mmol/L Final     CO2   Date/Time Value Ref Range Status   04/11/2025 08:41 AM 29 23 - 29 mmol/L Final   02/14/2025 09:11 AM 28 23 - 29 mmol/L Final     BUN   Date/Time Value Ref Range Status   04/11/2025 08:41 AM 16 6 - 20 mg/dL Final     Creatinine   Date/Time Value Ref Range Status   04/11/2025 08:41 AM 0.9 0.5 - 1.4 mg/dL Final     Prothrombin Time   Date/Time Value Ref Range Status   02/14/2025 09:11 AM 10.2 9.0 - 12.5 sec Final     Albumin   Date/Time Value Ref Range Status   04/11/2025 08:41 AM 3.7 3.5 - 5.2 g/dL Final   02/14/2025 09:11 AM 3.7 3.5 - 5.2 g/dL Final     Total Bilirubin   Date/Time Value Ref Range Status   02/14/2025 09:11 AM 0.3 0.1 - 1.0 mg/dL Final     Comment:     For infants and newborns, interpretation of results should be based  on gestational age, weight and in agreement with clinical  observations.    Premature Infant recommended reference ranges:  Up to 24 hours.............<8.0 mg/dL  Up to 48 hours............<12.0 mg/dL  3-5 days..................<15.0 mg/dL  6-29 days.................<15.0 mg/dL       Bilirubin Total   Date/Time Value Ref Range Status   04/11/2025 08:41 AM 0.5 0.1 - 1.0 mg/dL Final     Comment:     For infants and newborns, interpretation of results should be based   on gestational age, weight and in agreement with clinical   observations.    Premature Infant recommended reference ranges:   0-24 hours:  <8.0 mg/dL   24-48 hours: <12.0 mg/dL   3-5 days:    <15.0 mg/dL   6-29 days:   <15.0 mg/dL     Alkaline Phosphatase   Date/Time Value Ref Range Status   02/14/2025 09:11 AM 63 40 - 150 U/L Final     ALP   Date/Time Value Ref Range Status   04/11/2025 08:41 AM 68 40  - 150 unit/L Final     AST   Date/Time Value Ref Range Status   04/11/2025 08:41 AM 16 11 - 45 unit/L Final   02/14/2025 09:11 AM 19 10 - 40 U/L Final     ALT   Date/Time Value Ref Range Status   04/11/2025 08:41 AM 14 10 - 44 unit/L Final   02/14/2025 09:11 AM 11 10 - 44 U/L Final     Anion Gap   Date/Time Value Ref Range Status   04/11/2025 08:41 AM 8 8 - 16 mmol/L Final     eGFR   Date/Time Value Ref Range Status   04/11/2025 08:41 AM >60 >60 mL/min/1.73/m2 Final     Comment:     Estimated GFR calculated using the CKD-EPI creatinine (2021) equation.   02/14/2025 09:11 AM >60.0 >60 mL/min/1.73 m^2 Final       Lipids  Cholesterol   Date/Time Value Ref Range Status   01/09/2024 11:32  (H) 120 - 199 mg/dL Final     Comment:     The National Cholesterol Education Program (NCEP) has set the  following guidelines (reference ranges) for Cholesterol:  Optimal.....................<200 mg/dL  Borderline High.............200-239 mg/dL  High........................> or = 240 mg/dL       Triglycerides   Date/Time Value Ref Range Status   01/09/2024 11:32 AM 70 30 - 150 mg/dL Final     Comment:     The National Cholesterol Education Program (NCEP) has set the  following guidelines (reference values) for triglycerides:  Normal......................<150 mg/dL  Borderline High.............150-199 mg/dL  High........................200-499 mg/dL       HDL   Date/Time Value Ref Range Status   01/09/2024 11:32 AM 84 (H) 40 - 75 mg/dL Final     Comment:     The National Cholesterol Education Program (NCEP) has set the  following guidelines (reference values) for HDL Cholesterol:  Low...............<40 mg/dL  Optimal...........>60 mg/dL         Thyroid Function  TSH   Date/Time Value Ref Range Status   01/09/2024 11:32 AM 1.167 0.400 - 4.000 uIU/mL Final     Free T4   Date/Time Value Ref Range Status   01/09/2024 11:32 AM 0.92 0.71 - 1.51 ng/dL Final       Diabetes Screen  Hemoglobin A1C   Date/Time Value Ref Range Status    01/09/2024 11:32 AM 5.4 4.0 - 5.6 % Final     Comment:     ADA Screening Guidelines:  5.7-6.4%  Consistent with prediabetes  >or=6.5%  Consistent with diabetes    High levels of fetal hemoglobin interfere with the HbA1C  assay. Heterozygous hemoglobin variants (HbS, HgC, etc)do  not significantly interfere with this assay.   However, presence of multiple variants may affect accuracy.         Assessment and Plan:    ICD-10-CM ICD-9-CM   1. Encounter for annual general medical examination without abnormal findings in adult  Z00.00 V70.0   2. Primary hypertension  I10 401.9   3. Mixed hyperlipidemia  E78.2 272.2   4. Class 2 severe obesity due to excess calories with serious comorbidity and body mass index (BMI) of 35.0 to 35.9 in adult  E66.812 278.01    E66.01 V85.35    Z68.35    5. Encounter for screening for malignant neoplasm of colon  Z12.11 V76.51   6. Encounter for other screening for malignant neoplasm of breast  Z12.39 V76.19   7. Thyromegaly  E01.0 240.9     Orders Placed This Encounter    US Soft Tissue Head Neck    Comprehensive Metabolic Panel    CBC Auto Differential    Lipid Panel    Hemoglobin A1C    TSH    T4, Free    THYROID PEROXIDASE ANTIBODY    Thyroglobulin       Assessment & Plan    - Evaluated lump on neck, noting it was soft without firm nodules.  - Considered potential causes including thyroid swelling, lymph node enlargement, or superficial fatty tissue.    1. Encounter for annual general medical examination without abnormal findings in adult (Primary)  Health maintenance updated  Chronic issues reviewed  Fasting labs ordered  - Comprehensive Metabolic Panel; Future  - CBC Auto Differential; Future  - Lipid Panel; Future  - Hemoglobin A1C; Future  - TSH; Future  - T4, Free; Future    2. Primary hypertension  Stable and controlled  No refills needed today  Continue amlodipine, HCTZ  Comorbidity of Class 2 obesity    3. Mixed hyperlipidemia  Previously elevated across all cholesterol  values  Continue lipitor 20mg  Comorbidity of Class 2 obesity  - Comprehensive Metabolic Panel; Future  - Lipid Panel; Future    4. Class 2 severe obesity due to excess calories with serious comorbidity and body mass index (BMI) of 35.0 to 35.9 in adult  Comorbid HTN, HLP  Encourage healthy diet and exercise habits to optimize health and minimize chance of comorbidity development    5. Encounter for screening for malignant neoplasm of colon  Last Colonoscopy completed on 7/23/2024, next due 2034    6. Encounter for other screening for malignant neoplasm of breast  Mammo UTD, next due 04/2026    7. Thyromegaly  F/u TFTs  F/ additional antibody tests  F/u US for thyroid  - TSH; Future  - T4, Free; Future  - THYROID PEROXIDASE ANTIBODY; Future  - Thyroglobulin; Future  - US Soft Tissue Head Neck; Future      Check-Out:  Follow-Up  No follow-ups on file.    Upcoming Appointments  Future Appointments   Date Time Provider Department Center   5/16/2025  8:30 AM LAB, APPOINTMENT [facility] LAB IM Holger Bassett PCW   5/16/2025  9:15 AM [facility] US[facility]   5/27/2025 10:15 AM Dewayne Vargas III, MD [facility] ORTHO Holger Bassett Ort         Donnie Au MD, FAAFP  Family Medicine Physician  [facility]  5/9/2025      This note was generated with the assistance of ambient listening technology. Verbal consent was obtained by the patient and accompanying visitor(s) for the recording of patient appointment to facilitate this note. I attest to having reviewed and edited the generated note for accuracy, though some syntax or spelling errors may persist. Please contact the author of this note for any clarification.                                   [1]   Patient Active Problem List  Diagnosis    Hypertension    Hyperlipidemia    Chronic pain of both knees    Weakness of both lower extremities    Decreased range of motion of both knees    Gait difficulty    Primary osteoarthritis of both knees     Other specified glaucoma    Left knee pain    Class 2 severe obesity due to excess calories with serious comorbidity and body mass index (BMI) of 35.0 to 35.9 in adult    Primary osteoarthritis of left knee    Status post total knee replacement, left   [2]   Past Medical History:  Diagnosis Date    Arthritis     Back pain 2024    Class 2 severe obesity due to excess calories with serious comorbidity and body mass index (BMI) of 37.0 to 37.9 in adult 2024    Glaucoma     Hyperlipidemia     Hypertension     Insomnia 2024    Obesity, Class I, BMI 30.0-34.9 (see actual BMI) 2019    Prediabetes     Sleep apnea    [3]   Past Surgical History:  Procedure Laterality Date    ARTHROPLASTY, KNEE, TOTAL, USING COMPUTER-ASSISTED NAVIGATION Left 3/10/2025    Procedure: ARTHROPLASTY, KNEE, TOTAL, USING VELYS COMPUTER-ASSISTED NAVIGATION: LEFT: DEPUY - ATTUNE: SAME DAY;  Surgeon: Dewayne Vargas III, MD;  Location: [facility] OR;  Service: Orthopedics;  Laterality: Left;     SECTION      CHOLECYSTECTOMY      COLONOSCOPY N/A 2024    Procedure: COLONOSCOPY;  Surgeon: Jarred Olivarez MD;  Location: [facility] ENDOSCOPY;  Service: Endoscopy;  Laterality: N/A;  24- Rx reordered, lvm and portal msg for pc. DB  24- pt returned call, pc complete. DBM  24- r/s to later date, instr to portal. DB   pt r/s- precall complete- suflave inst portal-RB    HYSTERECTOMY     [4]   Social History  Tobacco Use   Smoking Status Never   Smokeless Tobacco Never   [5]   Current Outpatient Medications:     acetaminophen (TYLENOL) 650 MG TbSR, Take 1 tablet (650 mg total) by mouth every 8 (eight) hours., Disp: 120 tablet, Rfl: 0    amLODIPine (NORVASC) 5 MG tablet, Take 1 tablet (5 mg total) by mouth once daily., Disp: 90 tablet, Rfl: 3    aspirin (ECOTRIN) 81 MG EC tablet, Take 1 tablet (81 mg total) by mouth 2 (two) times a day., Disp: 60 tablet, Rfl: 0    atorvastatin (LIPITOR) 20 MG tablet, TAKE 1  TABLET BY MOUTH EVERY DAY, Disp: 90 tablet, Rfl: 3    cefadroxil (DURICEF) 500 MG Cap, Take 1 capsule (500 mg total) by mouth every 12 (twelve) hours., Disp: 14 capsule, Rfl: 0    celecoxib (CELEBREX) 200 MG capsule, Take 1 capsule (200 mg total) by mouth once daily., Disp: 30 capsule, Rfl: 0    hydroCHLOROthiazide 12.5 MG Tab, Take 1 tablet (12.5 mg total) by mouth once daily., Disp: 90 tablet, Rfl: 3    latanoprost 0.005 % ophthalmic solution, , Disp: , Rfl:     multivitamin (THERAGRAN) per tablet, Take 1 tablet by mouth once daily., Disp: , Rfl:     oxyCODONE (ROXICODONE) 5 MG immediate release tablet, Take 1-2 tablets every 4-6 hours as needed for pain, Disp: 40 tablet, Rfl: 0    pantoprazole (PROTONIX) 40 MG tablet, Take 1 tablet (40 mg total) by mouth once daily., Disp: 30 tablet, Rfl: 0    timolol maleate 0.5% (TIMOPTIC) 0.5 % Drop, Place 1 drop into both eyes 2 (two) times daily., Disp: , Rfl:

## 2025-05-15 ENCOUNTER — PATIENT MESSAGE (OUTPATIENT)
Dept: INTERNAL MEDICINE | Facility: CLINIC | Age: 60
End: 2025-05-15
Payer: OTHER GOVERNMENT

## 2025-05-16 ENCOUNTER — LAB VISIT (OUTPATIENT)
Dept: LAB | Facility: HOSPITAL | Age: 60
End: 2025-05-16
Attending: FAMILY MEDICINE
Payer: OTHER GOVERNMENT

## 2025-05-16 DIAGNOSIS — Z00.00 ENCOUNTER FOR ANNUAL GENERAL MEDICAL EXAMINATION WITHOUT ABNORMAL FINDINGS IN ADULT: ICD-10-CM

## 2025-05-16 DIAGNOSIS — E01.0 THYROMEGALY: ICD-10-CM

## 2025-05-16 DIAGNOSIS — E78.2 MIXED HYPERLIPIDEMIA: ICD-10-CM

## 2025-05-16 LAB
ABSOLUTE EOSINOPHIL (OHS): 0.24 K/UL
ABSOLUTE MONOCYTE (OHS): 0.33 K/UL (ref 0.3–1)
ABSOLUTE NEUTROPHIL COUNT (OHS): 2.35 K/UL (ref 1.8–7.7)
ALBUMIN SERPL BCP-MCNC: 3.8 G/DL (ref 3.5–5.2)
ALP SERPL-CCNC: 71 UNIT/L (ref 40–150)
ALT SERPL W/O P-5'-P-CCNC: 12 UNIT/L (ref 10–44)
ANION GAP (OHS): 12 MMOL/L (ref 8–16)
AST SERPL-CCNC: 15 UNIT/L (ref 11–45)
BASOPHILS # BLD AUTO: 0.08 K/UL
BASOPHILS NFR BLD AUTO: 1.6 %
BILIRUB SERPL-MCNC: 0.4 MG/DL (ref 0.1–1)
BUN SERPL-MCNC: 12 MG/DL (ref 6–20)
CALCIUM SERPL-MCNC: 9.4 MG/DL (ref 8.7–10.5)
CHLORIDE SERPL-SCNC: 104 MMOL/L (ref 95–110)
CHOLEST SERPL-MCNC: 253 MG/DL (ref 120–199)
CHOLEST/HDLC SERPL: 2.9 {RATIO} (ref 2–5)
CO2 SERPL-SCNC: 26 MMOL/L (ref 23–29)
CREAT SERPL-MCNC: 0.8 MG/DL (ref 0.5–1.4)
EAG (OHS): 111 MG/DL (ref 68–131)
ERYTHROCYTE [DISTWIDTH] IN BLOOD BY AUTOMATED COUNT: 13.6 % (ref 11.5–14.5)
GFR SERPLBLD CREATININE-BSD FMLA CKD-EPI: >60 ML/MIN/1.73/M2
GLUCOSE SERPL-MCNC: 92 MG/DL (ref 70–110)
HBA1C MFR BLD: 5.5 % (ref 4–5.6)
HCT VFR BLD AUTO: 38 % (ref 37–48.5)
HDLC SERPL-MCNC: 88 MG/DL (ref 40–75)
HDLC SERPL: 34.8 % (ref 20–50)
HGB BLD-MCNC: 12.5 GM/DL (ref 12–16)
IMM GRANULOCYTES # BLD AUTO: 0.01 K/UL (ref 0–0.04)
IMM GRANULOCYTES NFR BLD AUTO: 0.2 % (ref 0–0.5)
LDLC SERPL CALC-MCNC: 152 MG/DL (ref 63–159)
LYMPHOCYTES # BLD AUTO: 1.96 K/UL (ref 1–4.8)
MCH RBC QN AUTO: 30 PG (ref 27–31)
MCHC RBC AUTO-ENTMCNC: 32.9 G/DL (ref 32–36)
MCV RBC AUTO: 91 FL (ref 82–98)
NONHDLC SERPL-MCNC: 165 MG/DL
NUCLEATED RBC (/100WBC) (OHS): 0 /100 WBC
PLATELET # BLD AUTO: 376 K/UL (ref 150–450)
PMV BLD AUTO: 9.2 FL (ref 9.2–12.9)
POTASSIUM SERPL-SCNC: 3.7 MMOL/L (ref 3.5–5.1)
PROT SERPL-MCNC: 7.5 GM/DL (ref 6–8.4)
RBC # BLD AUTO: 4.16 M/UL (ref 4–5.4)
RELATIVE EOSINOPHIL (OHS): 4.8 %
RELATIVE LYMPHOCYTE (OHS): 39.4 % (ref 18–48)
RELATIVE MONOCYTE (OHS): 6.6 % (ref 4–15)
RELATIVE NEUTROPHIL (OHS): 47.4 % (ref 38–73)
SODIUM SERPL-SCNC: 142 MMOL/L (ref 136–145)
T4 FREE SERPL-MCNC: 1.08 NG/DL (ref 0.71–1.51)
THYROPEROXIDASE IGG SERPL-ACNC: <6 IU/ML
TRIGL SERPL-MCNC: 65 MG/DL (ref 30–150)
TSH SERPL-ACNC: 1.08 UIU/ML (ref 0.4–4)
WBC # BLD AUTO: 4.97 K/UL (ref 3.9–12.7)

## 2025-05-16 PROCEDURE — 36415 COLL VENOUS BLD VENIPUNCTURE: CPT

## 2025-05-16 PROCEDURE — 85025 COMPLETE CBC W/AUTO DIFF WBC: CPT

## 2025-05-16 PROCEDURE — 83036 HEMOGLOBIN GLYCOSYLATED A1C: CPT

## 2025-05-16 PROCEDURE — 86376 MICROSOMAL ANTIBODY EACH: CPT

## 2025-05-16 PROCEDURE — 84443 ASSAY THYROID STIM HORMONE: CPT

## 2025-05-16 PROCEDURE — 84432 ASSAY OF THYROGLOBULIN: CPT

## 2025-05-16 PROCEDURE — 84439 ASSAY OF FREE THYROXINE: CPT

## 2025-05-16 PROCEDURE — 82465 ASSAY BLD/SERUM CHOLESTEROL: CPT

## 2025-05-16 PROCEDURE — 84460 ALANINE AMINO (ALT) (SGPT): CPT

## 2025-05-19 LAB
ENDOCRINOLOGIST REVIEW: NORMAL
THYROGLOB AB SERPL IA-ACNC: <1.8 IU/ML
THYROGLOB SERPL-MCNC: 54 NG/ML

## 2025-05-23 ENCOUNTER — HOSPITAL ENCOUNTER (OUTPATIENT)
Dept: RADIOLOGY | Facility: HOSPITAL | Age: 60
Discharge: HOME | End: 2025-05-23
Attending: FAMILY MEDICINE
Payer: OTHER GOVERNMENT

## 2025-05-23 DIAGNOSIS — E01.0 THYROMEGALY: ICD-10-CM

## 2025-05-23 PROCEDURE — 76536 US EXAM OF HEAD AND NECK: CPT | Mod: 26,,, | Performed by: STUDENT IN AN ORGANIZED HEALTH CARE EDUCATION/TRAINING PROGRAM

## 2025-05-23 PROCEDURE — 76536 US EXAM OF HEAD AND NECK: CPT | Mod: TC

## 2025-05-27 ENCOUNTER — OFFICE VISIT (OUTPATIENT)
Dept: ORTHOPEDICS | Facility: CLINIC | Age: 60
End: 2025-05-27
Payer: OTHER GOVERNMENT

## 2025-05-27 DIAGNOSIS — Z96.652 S/P TOTAL KNEE REPLACEMENT, LEFT: Primary | ICD-10-CM

## 2025-05-27 PROCEDURE — 99024 POSTOP FOLLOW-UP VISIT: CPT | Mod: S$GLB,,, | Performed by: ORTHOPAEDIC SURGERY

## 2025-05-27 PROCEDURE — 99999 PR PBB SHADOW E&M-EST. PATIENT-LVL III: CPT | Mod: PBBFAC,,, | Performed by: ORTHOPAEDIC SURGERY

## 2025-05-27 RX ORDER — METHYLPREDNISOLONE 4 MG/1
TABLET ORAL
Qty: 1 EACH | Refills: 0 | Status: SHIPPED | OUTPATIENT
Start: 2025-05-27

## 2025-05-27 NOTE — PROGRESS NOTES
"  Subjective:     HPI:   Shiloh Thayer is a 59 y.o. female who presents 10 weeks out from left TKA    Date of surgery: L VTKA 3/10/25 SDD    History of Present Illness    CHIEF COMPLAINT:  - Stiffness in the operated knee.    HPI:  Ms. Thayer presents for follow-up approximately 2.5 months after knee replacement performed on March 10th. Her chief complaint is stiffness, particularly when standing and sitting. She reports discomfort in a specific area of the knee. Ankle swelling is also noted, which was expected to improve with time.    Knee stiffness worsens when standing and sitting, with difficulty standing for extended periods. Pain is localized to a specific area. PT has been completed. She now only uses a walker for long distances or in stores, but not at home or work. Difficulty with stairs, particularly descending, is reported.    For pain management, Tylenol has been used, but not in the past couple of weeks. She denies using Advil or Aleve.    She mentions issues with the right knee, stating it is worsening when used all day. However, she denies experiencing the same level of pain as before the surgery.    PREVIOUS TREATMENTS:  - PT: Completed, last recorded range of motion at 0 to 110 degrees.  - Walker use: For long distances, in stores, or outside, but not at home or work.    MEDICATIONS:  - Tylenol (acetaminophen): As needed, not used in the past couple of weeks    SURGICAL HISTORY:  - Total knee arthroplasty: Left knee, March 10th (approximately 2.5 months ago)    WORK STATUS:  - Working  - No walker or crutches used at work  - Experiences stiffness when standing for prolonged periods         Medications: tylenol none in 2 weeks     Assistive Devices: cane long distances outside or store, not in house or at work     PT: finished    Overall "going pretty good"   C/o stiffness   Stand for long time - post lat pain and stiffness  "Pain nowhere near where I was before"   Going down stairs still difficult "     R knee progressive pain        Objective:   There is no height or weight on file to calculate BMI.  Exam:    Gait: limp/antalgic none    Incision: healed    Stability:  Knee stable anterior-posterior varus and valgus stresses, no extensor lag    Extension: 2    Flexion: 113    Not hypersensitive     Pre-op   6 week 0-115      Physical Exam    Musculoskeletal: Incision looks good and healing nicely. Numbness around incision. Knee alignment looks good. Knee flexion to 112-113 degrees. Stability feels good.  IMAGING:  - Pre-operative XRs: Showed bone-on-bone wear in the affected knee, with the right knee also showing signs of wear.  - XR Knee: March 10th and at six-week pina, showed good positioning and alignment of the knee replacement components.         Imaging:  None today    Results                Assessment:       ICD-10-CM ICD-9-CM   1. S/P total knee replacement, left  Z96.652 V43.65      [facility] Doing well     Plan:       Patient is doing very well with their total knee arthroplasty.  They will continue with their routine care of the knee replacement and see me back for their follow-up at the routine interval.  If there are problems in the interim they will see me back sooner. Prophylactic antibiotic protocol given and explained to patient.     Assessment & Plan    LEFT KNEE PAIN AND STIFFNESS:   Prescribed Medrol Dosepak (steroid), tapering over 5 days, to help reduce soft tissue inflammation and swelling.   Prescribed a topical cream for knee irritation and soreness.   Take Tylenol more regularly for pain and stiffness.   Consider adding Aleve to medication regimen for additional inflammation control.   Use soft padding when kneeling to minimize discomfort.    RIGHT KNEE PAIN:   Follow up regarding right knee as needed.    MUSCLE ATROPHY (RIGHT THIGH):   Perform 100 straight leg raises daily to strengthen quadriceps muscle.    ARTIFICIAL LEFT KNEE JOINT:   Take antibiotics before dental  procedures or surgeries to prevent infection in the joint replacement.    FOLLOW-UP:   Follow up in 9 months for next set of XRs, around the 1-year anniversary of surgery.   Contact the office for any issues before the next scheduled appointment.          SLR 100x/day     Rx medrol dose pack   Rx compound cream   OTC tylenol, aleve more regularly     9 month follow up for annual xrays    F/u PRN for R knee - give L knee a little more time     This note was generated with the assistance of ambient listening technology. Verbal consent was obtained by the patient and accompanying visitor(s) for the recording of patient appointment to facilitate this note. I attest to having reviewed and edited the generated note for accuracy, though some syntax or spelling errors may persist. Please contact the author of this note for any clarification.      No orders of the defined types were placed in this encounter.      Medications Ordered This Encounter   Medications    methylPREDNISolone (MEDROL DOSEPACK) 4 mg tablet     Sig: use as directed     Dispense:  1 each     Refill:  0        Past Medical History:   Diagnosis Date    Arthritis     Back pain 2024    Class 2 severe obesity due to excess calories with serious comorbidity and body mass index (BMI) of 37.0 to 37.9 in adult 2024    Glaucoma     Hyperlipidemia     Hypertension     Insomnia 2024    Obesity, Class I, BMI 30.0-34.9 (see actual BMI) 2019    Prediabetes     Sleep apnea        Past Surgical History:   Procedure Laterality Date    ARTHROPLASTY, KNEE, TOTAL, USING COMPUTER-ASSISTED NAVIGATION Left 3/10/2025    Procedure: ARTHROPLASTY, KNEE, TOTAL, USING [employer] COMPUTER-ASSISTED NAVIGATION: LEFT: DEPUY - ATTUNE: SAME DAY;  Surgeon: Dewayne Vargas III, MD;  Location: [facility];  Service: Orthopedics;  Laterality: Left;     SECTION      CHOLECYSTECTOMY      COLONOSCOPY N/A 2024    Procedure: COLONOSCOPY;  Surgeon: Jarred Olivarez  MD JULIAN;  Location: [facility];  Service: Endoscopy;  Laterality: N/A;  24- Rx reordered, lvm and portal msg for pc. DB  24- pt returned call, pc complete. DBM  24- r/s to later date, instr to portal. DBM   pt r/s- precall complete- suflave inst portal-RB    HYSTERECTOMY         Family History   Problem Relation Name Age of Onset    Stroke Mother Lisbet Gomez     Diabetes Mother Lisbet Gomez     Hypertension Mother Lisbet Gomez     Glaucoma Mother Lisbet Gomez     Brain aneurysm Mother Lisbet Gomez     Heart disease Father           from MI age 50s       Social History     Socioeconomic History    Marital status:      Spouse name: Mak    Number of children: 1   Occupational History    Occupation:    Tobacco Use    Smoking status: Never    Smokeless tobacco: Never   Substance and Sexual Activity    Alcohol use: Yes     Alcohol/week: 2.0 standard drinks of alcohol     Types: 2 Glasses of wine per week     Comment: once a month    Drug use: Not Currently    Sexual activity: Yes     Partners: Male     Birth control/protection: See Surgical Hx     Social Drivers of Health     Financial Resource Strain: Low Risk  (2025)    Overall Financial Resource Strain (CARDIA)     Difficulty of Paying Living Expenses: Not very hard   Food Insecurity: No Food Insecurity (2025)    Hunger Vital Sign     Worried About Running Out of Food in the Last Year: Never true     Ran Out of Food in the Last Year: Never true   Transportation Needs: No Transportation Needs (2025)    PRAPARE - Transportation     Lack of Transportation (Medical): No     Lack of Transportation (Non-Medical): No   Physical Activity: Inactive (2025)    Exercise Vital Sign     Days of Exercise per Week: 0 days     Minutes of Exercise per Session: 10 min   Stress: No Stress Concern Present (2025)    [employer] - Occupational Stress Questionnaire      Feeling of Stress : Only a little   Housing Stability: Low Risk  (5/9/2025)    Housing Stability Vital Sign     Unable to Pay for Housing in the Last Year: No     Number of Times Moved in the Last Year: 0     Homeless in the Last Year: No

## 2025-06-11 ENCOUNTER — PATIENT MESSAGE (OUTPATIENT)
Dept: INTERNAL MEDICINE | Facility: CLINIC | Age: 60
End: 2025-06-11
Payer: OTHER GOVERNMENT

## 2025-06-11 NOTE — TELEPHONE ENCOUNTER
LOV with Donnie Au MD , 5/9/2025    Patient requesting a call in regard to thyroid ultrasound dated 5/23/25

## 2025-06-25 ENCOUNTER — PATIENT MESSAGE (OUTPATIENT)
Dept: INTERNAL MEDICINE | Facility: CLINIC | Age: 60
End: 2025-06-25
Payer: OTHER GOVERNMENT

## 2025-06-25 DIAGNOSIS — E04.1 THYROID NODULE: Primary | ICD-10-CM

## 2025-06-26 ENCOUNTER — TELEPHONE (OUTPATIENT)
Dept: INTERNAL MEDICINE | Facility: CLINIC | Age: 60
End: 2025-06-26
Payer: OTHER GOVERNMENT

## 2025-06-26 DIAGNOSIS — E04.2 MULTIPLE THYROID NODULES: Primary | ICD-10-CM

## 2025-06-26 RX ORDER — ROSUVASTATIN CALCIUM 10 MG/1
10 TABLET, COATED ORAL DAILY
Qty: 90 TABLET | Refills: 3 | Status: SHIPPED | OUTPATIENT
Start: 2025-06-26

## 2025-06-26 NOTE — TELEPHONE ENCOUNTER
I called and spoke to her-  will set up endocrine visit for FNA and I also changed her atorvastatin to rosuvastatin  due to some leg pain and she is not taking rosuvastatin

## 2025-07-02 ENCOUNTER — HOSPITAL ENCOUNTER (OUTPATIENT)
Dept: RADIOLOGY | Facility: HOSPITAL | Age: 60
Discharge: HOME | End: 2025-07-02
Attending: PHYSICIAN ASSISTANT
Payer: OTHER GOVERNMENT

## 2025-07-02 ENCOUNTER — OFFICE VISIT (OUTPATIENT)
Facility: CLINIC | Age: 60
End: 2025-07-02
Payer: OTHER GOVERNMENT

## 2025-07-02 VITALS
SYSTOLIC BLOOD PRESSURE: 157 MMHG | DIASTOLIC BLOOD PRESSURE: 75 MMHG | HEART RATE: 130 BPM | WEIGHT: 237.56 LBS | BODY MASS INDEX: 36.12 KG/M2

## 2025-07-02 DIAGNOSIS — M17.11 PRIMARY OSTEOARTHRITIS OF RIGHT KNEE: ICD-10-CM

## 2025-07-02 DIAGNOSIS — M25.561 RIGHT KNEE PAIN, UNSPECIFIED CHRONICITY: Primary | ICD-10-CM

## 2025-07-02 DIAGNOSIS — M25.561 RIGHT KNEE PAIN, UNSPECIFIED CHRONICITY: ICD-10-CM

## 2025-07-02 PROCEDURE — 73564 X-RAY EXAM KNEE 4 OR MORE: CPT | Mod: 26,RT,, | Performed by: RADIOLOGY

## 2025-07-02 PROCEDURE — 73562 X-RAY EXAM OF KNEE 3: CPT | Mod: 26,LT,, | Performed by: RADIOLOGY

## 2025-07-02 PROCEDURE — 99999 PR PBB SHADOW E&M-EST. PATIENT-LVL III: CPT | Mod: PBBFAC,,, | Performed by: PHYSICIAN ASSISTANT

## 2025-07-02 PROCEDURE — 73562 X-RAY EXAM OF KNEE 3: CPT | Mod: TC,LT

## 2025-07-02 RX ORDER — METHYLPREDNISOLONE ACETATE 40 MG/ML
40 INJECTION, SUSPENSION INTRA-ARTICULAR; INTRALESIONAL; INTRAMUSCULAR; SOFT TISSUE
Status: DISCONTINUED | OUTPATIENT
Start: 2025-07-02 | End: 2025-07-02

## 2025-07-02 RX ORDER — METHYLPREDNISOLONE ACETATE 40 MG/ML
40 INJECTION, SUSPENSION INTRA-ARTICULAR; INTRALESIONAL; INTRAMUSCULAR; SOFT TISSUE ONCE
Status: COMPLETED | OUTPATIENT
Start: 2025-07-02 | End: 2025-07-02

## 2025-07-02 RX ADMIN — METHYLPREDNISOLONE ACETATE 40 MG: 40 INJECTION, SUSPENSION INTRA-ARTICULAR; INTRALESIONAL; INTRAMUSCULAR; SOFT TISSUE at 07:07

## 2025-07-03 NOTE — PROGRESS NOTES
Subjective:      Patient ID: Shiloh Thayer is a 59 y.o. female.    Chief Complaint: Pain of the Right Knee       HPI:     History of Present Illness    CHIEF COMPLAINT:  - Right knee pain    HPI:  Shiloh presents for evaluation of right knee pain and to receive a knee injection. The pain is localized to the joint line area. She reports clicking in the knee, especially pronounced in the morning, and constant swelling. She denies the knee getting stuck,buckling or locking, and denies pain when pressure is applied to certain areas of the knee.    She received an injection in her right knee in February, which provided relief until now. She is seeking another injection today, as the previous one has worn off.    She underwent a left total knee replacement approximately four months ago, in April. Her left knee feels good but occasionally gets a little stiff, which she was told to expect given the recent surgery.    She has a history of diabetes.    PREVIOUS TREATMENTS:  - Corticosteroid injection in the right knee: February, provided benefit until recently      MEDICAL HISTORY:  - Diabetes    SURGICAL HISTORY:  - Left total knee replacement: 4 months ago (April)      ROS:  Musculoskeletal: +joint pain, +joint swelling, +joint stiffness, +limb pain            PAST MEDICAL HISTORY:    Past Medical History:   Diagnosis Date    Arthritis     Back pain 01/09/2024    Class 2 severe obesity due to excess calories with serious comorbidity and body mass index (BMI) of 37.0 to 37.9 in adult 02/06/2024    Glaucoma     Hyperlipidemia     Hypertension     Insomnia 01/17/2024    Obesity, Class I, BMI 30.0-34.9 (see actual BMI) 07/16/2019    Prediabetes     Sleep apnea      PAST SURGICAL HISTORY:    Past Surgical History:   Procedure Laterality Date    ARTHROPLASTY, KNEE, TOTAL, USING COMPUTER-ASSISTED NAVIGATION Left 3/10/2025    Procedure: ARTHROPLASTY, KNEE, TOTAL, USING [employer] COMPUTER-ASSISTED NAVIGATION: LEFT: DEPUY - ATTUNE:  SAME DAY;  Surgeon: Dewayne Vargas III, MD;  Location: [facility];  Service: Orthopedics;  Laterality: Left;     SECTION      CHOLECYSTECTOMY      COLONOSCOPY N/A 2024    Procedure: COLONOSCOPY;  Surgeon: Jarred Olivarez MD;  Location: [facility] ENDOSCOPY;  Service: Endoscopy;  Laterality: N/A;  24- Rx reordered, lvm and portal msg for pc. DB  24- pt returned call, pc complete. DBM  24- r/s to later date, instr to portal. DB   pt r/s- precall complete- suflave inst portal-RB    HYSTERECTOMY       FAMILY HISTORY:    Family History   Problem Relation Name Age of Onset    Stroke Mother Lisbet Gomez     Diabetes Mother Lisbet Gomez     Hypertension Mother Lisbet Gomez     Glaucoma Mother Lisbet Gomez     Brain aneurysm Mother Lisbet Gomez     Heart disease Father           from MI age 50s     SOCIAL HISTORY:    Social History     Occupational History    Occupation:    Tobacco Use    Smoking status: Never    Smokeless tobacco: Never   Substance and Sexual Activity    Alcohol use: Yes     Alcohol/week: 2.0 standard drinks of alcohol     Types: 2 Glasses of wine per week     Comment: once a month    Drug use: Not Currently    Sexual activity: Yes     Partners: Male     Birth control/protection: See Surgical Hx        MEDICATIONS: Current Medications[1]  ALLERGIES: Review of patient's allergies indicates:  No Known Allergies    Review of Systems:  Constitution: Negative for chills, fever and night sweats.   HENT: Negative for congestion and headaches.    Eyes: Negative for blurred vision or vision loss.  Cardiovascular: Negative for chest pain and syncope.   Respiratory: Negative for cough and shortness of breath.    Endocrine: Negative for polydipsia, polyphagia and polyuria.   Hematologic/Lymphatic: Negative for bleeding problem. Does not bruise/bleed easily.   Skin: Negative for dry skin, itching and rash.   Musculoskeletal: See HPI.    Gastrointestinal: Negative for abdominal pain and bowel incontinence.   Genitourinary: Negative for bladder incontinence and nocturia.   Neurological: Negative for disturbances in coordination, loss of balance and seizures.   Psychiatric/Behavioral: Negative for depression. The patient does not have insomnia.    Allergic/Immunologic: Negative for hives and persistent infections.          Objective:        Vitals:    07/02/25 1857   BP: (!) 157/75   Pulse: (!) 130       PHYSICAL EXAM:  General: Alert & oriented x3, well-developed and well-nourished, in no acute distress, sitting comfortably in the exam room.  Skin: Warm and dry. Capillary refill less than 2 seconds.   Head: Normocephalic and atraumatic.   Eyes: Sclera appear normal.   Nose: No deformities seen.   Ears: No deformities seen.   Neck: No tracheal deviation present.   Pulmonary/Chest: Breathing unlabored.   Neurological: Alert and oriented to person, place, and time.   Psychiatric: Mood is pleasant and affect appropriate.     RIGHT KNEE    OBSERVATION / INSPECTION   Gait:   Nonantalgic   Alignment:  Neutral   Skin:   intact  Muscle atrophy: None  Effusion:  1+  Warmth:  None   Discoloration:   None     TENDERNESS   Patella     minor    Peripatellar medial    Negative   Peripatellar lateral   Negative   Patellar tendon   Negative   Quad tendon     Negative    Prepatellar Bursa   Negative    Tibial tubercle    Negative    Pes anserine/HS   Negative      ITB     Negative      LCL     Negative  MFC     Negative  LFC     Negative  MCL      Negative  Medial Joint Line    Pain   Lateral Joint Line   Pain  Quadriceps    Negative  Hamstring     Negative            RANGE OF  MOTION:    Left knee:   0-115°  Right knee:    0-120°      Patellofemoral examination:   Patella position    Centered  Crepitus (PF):    present   Lateral tilt:    Normal   J-sign:     None   Patellofemoral grind:   No pain   Patellar apprehension:                      negative  Popliteal  cyst:                                    negative    MENISCAL SIGNS:     Pain on terminal extension:  Negative  Pain on terminal flexion:  Negative  Grahams maneuver:  Negative     LIGAMENT EXAMINATION:  MCL/Valgus:                            intact  ACL/Lachman:                        intact  PCL/Posterior drawer:             intact   LCL/Varus:                             intact      STRENGTH: (* = with pain)   Quadricep   4/5  Hamstrin/5    EXTREMITY NEURO-VASCULAR EXAMINATION:   Sensation:  Grossly intact to light touch all dermatomal regions.   Motor Function:  Fully intact motor function at hip, knee, foot and ankle    DTRs;  quadriceps and achilles 2+.  No clonus and downgoing Babinski.    Vascular status:  DP and PT pulses 2+, brisk capillary refill, symmetric.     Intact EHL, FHL, EDL, FDL, gastrocsoleus, and tibialis anterior.      Calf Supple, non tender     Imaging:   X-Rays right knee shows severe tri compartmental DJD unchanged from previous xray 10/21/24        Assessment:       1. Right knee pain, unspecified chronicity    2. Primary osteoarthritis of right knee          Plan:       Orders Placed This Encounter    X-ray Knee Ortho Right with Flexion (XPD)       I made the decision to obtain old records of the patient including previous notes and imaging. New imaging was ordered today of the extremity or extremities evaluated. I independently reviewed and interpreted the radiographs and/or MRIs/CT scan today as well as prior imaging. Reviewed imaging in detail with patient.     I explained the nature of the problem to the patient. I discussed at length with the patient all the different treatment options available for her right knee including anti-inflammatories, acetaminophen, bracing, rest, ice, heat, physical therapy, corticosteroid injections, and viscosupplement injections. Patient agreeable to following plan:    =Cortisone injection right knee today for pain relief  =F/U with   Sam if pain returns or increases for possible Gel injections Right knee    Injection Procedure Note   Prior to procedure the correct patient, procedure, and site was verified. Allergies were reviewed and verbal consent was obtained. The procedure site was marked.    Injection:  A therapeutic injection of combination of 4 cc 2% lidocaine without epi and and 40mg Depo Medrol was given under sterile technique from the anteriolateral  aspect of the right knee. Sterile dressing applied.     Patient tolerated the procedure well. Pain decreased. She had no adverse reactions to the medication.     The patient is cautioned that immediate relief of pain is secondary to the local anesthetic and will be temporary. After the anesthetic wears off there may be a increase in pain that may last for a few hours or a few days. Advised patient to apply ice for 20 minutes to help alleviate pain and ACE wrap for compression. Advised patient to avoid strenuous activities for the next 24-48 hours. She was warned of possible blood sugar and/or blood pressure changes following injection. She was reminded to call the clinic immediately for any adverse side effects as explained in clinic today.       MADDIE Dillon  [facility]       All of the patient's questions were answered and the patient will contact us if they have any questions or concerns in the interim.    Rocky Blake PA-C    This note was generated with the assistance of ambient listening technology. Verbal consent was obtained by the patient and accompanying visitor(s) for the recording of patient appointment to facilitate this note. I attest to having reviewed and edited the generated note for accuracy, though some syntax or spelling errors may persist. Please contact the author of this note for any clarification.               [1]   Current Outpatient Medications:     acetaminophen (TYLENOL) 650 MG TbSR, Take 1 tablet (650 mg total) by  mouth every 8 (eight) hours., Disp: 120 tablet, Rfl: 0    amLODIPine (NORVASC) 5 MG tablet, Take 1 tablet (5 mg total) by mouth once daily., Disp: 90 tablet, Rfl: 3    aspirin (ECOTRIN) 81 MG EC tablet, Take 1 tablet (81 mg total) by mouth 2 (two) times a day., Disp: 60 tablet, Rfl: 0    cefadroxil (DURICEF) 500 MG Cap, Take 1 capsule (500 mg total) by mouth every 12 (twelve) hours., Disp: 14 capsule, Rfl: 0    celecoxib (CELEBREX) 200 MG capsule, Take 1 capsule (200 mg total) by mouth once daily., Disp: 30 capsule, Rfl: 0    hydroCHLOROthiazide 12.5 MG Tab, Take 1 tablet (12.5 mg total) by mouth once daily., Disp: 90 tablet, Rfl: 3    latanoprost 0.005 % ophthalmic solution, , Disp: , Rfl:     methylPREDNISolone (MEDROL DOSEPACK) 4 mg tablet, use as directed, Disp: 1 each, Rfl: 0    multivitamin (THERAGRAN) per tablet, Take 1 tablet by mouth once daily., Disp: , Rfl:     oxyCODONE (ROXICODONE) 5 MG immediate release tablet, Take 1-2 tablets every 4-6 hours as needed for pain, Disp: 40 tablet, Rfl: 0    pantoprazole (PROTONIX) 40 MG tablet, Take 1 tablet (40 mg total) by mouth once daily., Disp: 30 tablet, Rfl: 0    rosuvastatin (CRESTOR) 10 MG tablet, Take 1 tablet (10 mg total) by mouth once daily., Disp: 90 tablet, Rfl: 3    timolol maleate 0.5% (TIMOPTIC) 0.5 % Drop, Place 1 drop into both eyes 2 (two) times daily., Disp: , Rfl:

## 2025-07-18 ENCOUNTER — PATIENT MESSAGE (OUTPATIENT)
Dept: ORTHOPEDICS | Facility: CLINIC | Age: 60
End: 2025-07-18

## 2025-07-18 ENCOUNTER — OFFICE VISIT (OUTPATIENT)
Dept: ORTHOPEDICS | Facility: CLINIC | Age: 60
End: 2025-07-18
Payer: OTHER GOVERNMENT

## 2025-07-18 VITALS — WEIGHT: 237.63 LBS | BODY MASS INDEX: 36.14 KG/M2

## 2025-07-18 DIAGNOSIS — M17.11 PRIMARY OSTEOARTHRITIS OF RIGHT KNEE: Primary | ICD-10-CM

## 2025-07-18 PROCEDURE — 99999 PR PBB SHADOW E&M-EST. PATIENT-LVL III: CPT | Mod: PBBFAC,,, | Performed by: NURSE PRACTITIONER

## 2025-07-18 RX ORDER — MELOXICAM 15 MG/1
15 TABLET ORAL DAILY
Qty: 30 TABLET | Refills: 1 | Status: SHIPPED | OUTPATIENT
Start: 2025-07-18

## 2025-07-18 NOTE — PROGRESS NOTES
CC: Pain of the Right Knee      HPI:   Pt with c/o right knee pain. The pain became severe the week before the 4th of July. She was seen at the [facility] and was given a cortisone injection. She has relief for about a week and the pain has returned. She had the left knee replaced by Dr. Vargas in March and was hoping to wait on having the right knee replaced due to being out of work for an extended period. . She stopped her mobic for surgery and has not restarted it. She comes in with her . She is ambulating without assistive device. There is a limp.      ROS  General: denies fever and chills  Resp: no c/o sob  CVS: no c/o cp  MSK: c/o right knee pain    PE  General: AAOx3, pleasant and cooperative  Resp: respirations even and unlabored  MSK: right knee exam  5 degrees extension increased crepitus and pain  100 degrees flexion increased pain  No warmth or erythema   - effusion  5/5 quad and hamstring strength  + tenderness over the lateral joint line    Xray:  Xray from urgent care reviewed by me with the patient and her : there are significant degenerative changes with joint space loss noted    Assessment:  Right knee djd    Plan:  Restart mobic now- new rx sent in  Message sent to Naveed for scheduling surgery in November F/u as scheduled with Dr. Vargas

## 2025-09-02 ENCOUNTER — OFFICE VISIT (OUTPATIENT)
Dept: ORTHOPEDICS | Facility: CLINIC | Age: 60
End: 2025-09-02
Payer: OTHER GOVERNMENT

## 2025-09-02 VITALS — HEIGHT: 68 IN | WEIGHT: 232.19 LBS | BODY MASS INDEX: 35.19 KG/M2

## 2025-09-02 DIAGNOSIS — Z96.652 S/P TOTAL KNEE REPLACEMENT, LEFT: ICD-10-CM

## 2025-09-02 DIAGNOSIS — M17.11 PRIMARY OSTEOARTHRITIS OF RIGHT KNEE: Primary | ICD-10-CM

## 2025-09-02 PROCEDURE — 99214 OFFICE O/P EST MOD 30 MIN: CPT | Mod: S$GLB,,, | Performed by: ORTHOPAEDIC SURGERY

## 2025-09-02 PROCEDURE — 99999 PR PBB SHADOW E&M-EST. PATIENT-LVL III: CPT | Mod: PBBFAC,,, | Performed by: ORTHOPAEDIC SURGERY

## (undated) DEVICE — TUBE SUCTION FRAZIER VENT 10FR

## (undated) DEVICE — SOL NACL IRR 3000ML

## (undated) DEVICE — ATTUNE PINNING SYSTEM
Type: IMPLANTABLE DEVICE | Site: KNEE | Status: NON-FUNCTIONAL
Brand: ATTUNE
Removed: 2025-03-10

## (undated) DEVICE — CONTAINER SPECIMEN OR STER 4OZ

## (undated) DEVICE — SUT MCRYL PLUS 4-0 PS2 27IN

## (undated) DEVICE — DRAPE SURG W/TWL 17 5/8X23

## (undated) DEVICE — DRAPE THREE-QTR REINF 53X77IN

## (undated) DEVICE — GLOVE BIOGEL SKINSENSE PI 8.0

## (undated) DEVICE — KIT TOTAL KNEE TKOFG OMC

## (undated) DEVICE — DRAPE INCISE IOBAN 2 23X33IN

## (undated) DEVICE — SUT 1 36IN COATED VICRYL UN

## (undated) DEVICE — DRAPE TOP 53X102IN

## (undated) DEVICE — PIN VELYS ARRAY DRILL 4X175MM
Type: IMPLANTABLE DEVICE | Site: KNEE | Status: NON-FUNCTIONAL
Removed: 2025-03-10

## (undated) DEVICE — GOWN SMARTGOWN 3XL XLONG

## (undated) DEVICE — MIXER BONE CEMENT

## (undated) DEVICE — SUT QUILL 2T11 36IN

## (undated) DEVICE — HOOD T7 W/ PEEL AWAY LENS

## (undated) DEVICE — PULSAVAC ZIMMER

## (undated) DEVICE — DRESSING TRANS 4X4 TEGADERM

## (undated) DEVICE — ADHESIVE DERMABOND ADVANCED

## (undated) DEVICE — SYR 50CC LL

## (undated) DEVICE — SPONGE COTTON TRAY 4X4IN

## (undated) DEVICE — ALCOHOL 70% ANTISEPTIC ISO 4OZ

## (undated) DEVICE — SUT VICRYL 3-0 OB 36 CT-1

## (undated) DEVICE — SYS IRRISEPT 450ML0.05% CHG

## (undated) DEVICE — TOWEL OR DISP STRL BLUE 4/PK

## (undated) DEVICE — DRAPE VELYS DEVICE STERILE

## (undated) DEVICE — BRUSH SCRUB HIBICLENS 4%

## (undated) DEVICE — MARKER SKIN RULER STERILE

## (undated) DEVICE — SOL BETADINE 5%

## (undated) DEVICE — PIN VELYS ARRAY DRILL 4X125MM
Type: IMPLANTABLE DEVICE | Site: KNEE | Status: NON-FUNCTIONAL
Removed: 2025-03-10

## (undated) DEVICE — UNDERGLOVES BIOGEL PI SIZE 8.5

## (undated) DEVICE — DRAPE VELYS SATELLITE STATION

## (undated) DEVICE — NDL HYPO STD REG BVL 18GX1.5IN

## (undated) DEVICE — BLADE DUAL CUT SAG 35X64X.89MM

## (undated) DEVICE — DRAPE STERI U-SHAPED 47X51IN

## (undated) DEVICE — SOL NACL IRR 1000ML BTL

## (undated) DEVICE — PENCIL ROCKER SWITCH 10FT CORD

## (undated) DEVICE — TAPE SILK 3IN

## (undated) DEVICE — DRESSING TELFA N ADH 3X8

## (undated) DEVICE — SET VELYS PURESIGHT ARRAY KNEE

## (undated) DEVICE — BLADE VELYS OSCILLATING SAW

## (undated) DEVICE — SPONGE GAUZE 16PLY 4X4

## (undated) DEVICE — DRESSING AQUACEL AG RBBN 2X45

## (undated) DEVICE — TOWEL OR XRAY WHITE 17X26IN

## (undated) DEVICE — BLADE RECIP DS OFST 70X1X12.5

## (undated) DEVICE — SUT 2/0 36IN COATED VICRYL

## (undated) DEVICE — SYS REVOLUTION CEMENT MIXING

## (undated) DEVICE — ELECTRODE REM PLYHSV RETURN 9

## (undated) DEVICE — DRAPE T EXTRM SURG 121X128X90